# Patient Record
Sex: MALE | Race: WHITE | NOT HISPANIC OR LATINO | Employment: UNEMPLOYED | URBAN - NONMETROPOLITAN AREA
[De-identification: names, ages, dates, MRNs, and addresses within clinical notes are randomized per-mention and may not be internally consistent; named-entity substitution may affect disease eponyms.]

---

## 2019-05-26 ENCOUNTER — APPOINTMENT (OUTPATIENT)
Dept: GENERAL RADIOLOGY | Facility: HOSPITAL | Age: 44
End: 2019-05-26

## 2019-05-26 ENCOUNTER — HOSPITAL ENCOUNTER (EMERGENCY)
Facility: HOSPITAL | Age: 44
Discharge: HOME | End: 2019-05-26
Attending: EMERGENCY MEDICINE | Admitting: EMERGENCY MEDICINE

## 2019-05-26 VITALS
OXYGEN SATURATION: 94 % | BODY MASS INDEX: 27.19 KG/M2 | HEIGHT: 68 IN | TEMPERATURE: 98.2 F | SYSTOLIC BLOOD PRESSURE: 121 MMHG | DIASTOLIC BLOOD PRESSURE: 63 MMHG | RESPIRATION RATE: 16 BRPM | HEART RATE: 84 BPM | WEIGHT: 179.4 LBS

## 2019-05-26 DIAGNOSIS — N30.00 ACUTE CYSTITIS WITHOUT HEMATURIA: ICD-10-CM

## 2019-05-26 DIAGNOSIS — J40 BRONCHITIS: Primary | ICD-10-CM

## 2019-05-26 LAB
BACTERIA UR QL AUTO: ABNORMAL /HPF
BILIRUB UR QL STRIP: ABNORMAL
CLARITY UR: CLEAR
COLOR UR: ABNORMAL
GLUCOSE UR STRIP-MCNC: NEGATIVE MG/DL
GRAN CASTS URNS QL MICRO: ABNORMAL /LPF
HGB UR QL STRIP.AUTO: NEGATIVE
HYALINE CASTS UR QL AUTO: ABNORMAL /LPF
KETONES UR QL STRIP: ABNORMAL
LEUKOCYTE ESTERASE UR QL STRIP.AUTO: ABNORMAL
MUCOUS THREADS URNS QL MICRO: ABNORMAL /HPF
NITRITE UR QL STRIP: NEGATIVE
PH UR STRIP.AUTO: 5.5 [PH] (ref 5–8)
PROT UR QL STRIP: NEGATIVE
RBC # UR: ABNORMAL /HPF
REF LAB TEST METHOD: ABNORMAL
SP GR UR STRIP: >=1.03 (ref 1–1.03)
SQUAMOUS #/AREA URNS HPF: ABNORMAL /HPF
UROBILINOGEN UR QL STRIP: ABNORMAL
WBC UR QL AUTO: ABNORMAL /HPF

## 2019-05-26 PROCEDURE — 94640 AIRWAY INHALATION TREATMENT: CPT

## 2019-05-26 PROCEDURE — 71046 X-RAY EXAM CHEST 2 VIEWS: CPT

## 2019-05-26 PROCEDURE — 81001 URINALYSIS AUTO W/SCOPE: CPT | Performed by: NURSE PRACTITIONER

## 2019-05-26 PROCEDURE — 99283 EMERGENCY DEPT VISIT LOW MDM: CPT

## 2019-05-26 PROCEDURE — 87086 URINE CULTURE/COLONY COUNT: CPT | Performed by: NURSE PRACTITIONER

## 2019-05-26 PROCEDURE — 94799 UNLISTED PULMONARY SVC/PX: CPT

## 2019-05-26 RX ORDER — ALBUTEROL SULFATE 90 UG/1
2 AEROSOL, METERED RESPIRATORY (INHALATION) EVERY 6 HOURS PRN
Qty: 1 INHALER | Refills: 0 | Status: SHIPPED | OUTPATIENT
Start: 2019-05-26

## 2019-05-26 RX ORDER — METHYLPREDNISOLONE 4 MG/1
TABLET ORAL
Qty: 1 EACH | Refills: 0 | Status: ON HOLD | OUTPATIENT
Start: 2019-05-26 | End: 2020-11-30

## 2019-05-26 RX ORDER — CEPHALEXIN 500 MG/1
500 CAPSULE ORAL 3 TIMES DAILY
Qty: 30 CAPSULE | Refills: 0 | Status: ON HOLD | OUTPATIENT
Start: 2019-05-26 | End: 2020-11-30

## 2019-05-26 RX ORDER — IPRATROPIUM BROMIDE AND ALBUTEROL SULFATE 2.5; .5 MG/3ML; MG/3ML
3 SOLUTION RESPIRATORY (INHALATION) ONCE
Status: COMPLETED | OUTPATIENT
Start: 2019-05-26 | End: 2019-05-26

## 2019-05-26 RX ADMIN — IPRATROPIUM BROMIDE AND ALBUTEROL SULFATE 3 ML: .5; 3 SOLUTION RESPIRATORY (INHALATION) at 10:37

## 2019-05-28 LAB — BACTERIA SPEC AEROBE CULT: NO GROWTH

## 2019-07-23 ENCOUNTER — HOSPITAL ENCOUNTER (EMERGENCY)
Facility: HOSPITAL | Age: 44
Discharge: HOME | End: 2019-07-23
Attending: EMERGENCY MEDICINE | Admitting: EMERGENCY MEDICINE

## 2019-07-23 VITALS
OXYGEN SATURATION: 98 % | HEART RATE: 77 BPM | BODY MASS INDEX: 25.11 KG/M2 | DIASTOLIC BLOOD PRESSURE: 79 MMHG | TEMPERATURE: 97.4 F | SYSTOLIC BLOOD PRESSURE: 132 MMHG | RESPIRATION RATE: 18 BRPM | WEIGHT: 160 LBS | HEIGHT: 67 IN

## 2019-07-23 DIAGNOSIS — B37.0 THRUSH: ICD-10-CM

## 2019-07-23 DIAGNOSIS — B35.6 TINEA CRURIS: Primary | ICD-10-CM

## 2019-07-23 DIAGNOSIS — R44.3 HALLUCINATIONS: ICD-10-CM

## 2019-07-23 DIAGNOSIS — F11.21 OPIOID DEPENDENCE IN REMISSION (HCC): ICD-10-CM

## 2019-07-23 LAB
ALBUMIN SERPL-MCNC: 4.1 G/DL (ref 3.5–5)
ALBUMIN/GLOB SERPL: 1.2 G/DL (ref 1–2)
ALP SERPL-CCNC: 92 U/L (ref 38–126)
ALT SERPL W P-5'-P-CCNC: 41 U/L (ref 13–69)
AMPHET+METHAMPHET UR QL: NEGATIVE
AMPHETAMINES UR QL: NEGATIVE
ANION GAP SERPL CALCULATED.3IONS-SCNC: 11.6 MMOL/L (ref 10–20)
APAP SERPL-MCNC: <10 MCG/ML (ref 10–30)
AST SERPL-CCNC: 72 U/L (ref 15–46)
BACTERIA UR QL AUTO: ABNORMAL /HPF
BARBITURATES UR QL SCN: NEGATIVE
BASOPHILS # BLD AUTO: 0.02 10*3/MM3 (ref 0–0.2)
BASOPHILS NFR BLD AUTO: 0.3 % (ref 0–1.5)
BENZODIAZ UR QL SCN: NEGATIVE
BILIRUB SERPL-MCNC: 1 MG/DL (ref 0.2–1.3)
BILIRUB UR QL STRIP: ABNORMAL
BUN BLD-MCNC: 17 MG/DL (ref 7–20)
BUN/CREAT SERPL: 18.9 (ref 6.3–21.9)
BUPRENORPHINE SERPL-MCNC: POSITIVE NG/ML
CALCIUM SPEC-SCNC: 9.2 MG/DL (ref 8.4–10.2)
CANNABINOIDS SERPL QL: NEGATIVE
CHLORIDE SERPL-SCNC: 100 MMOL/L (ref 98–107)
CLARITY UR: CLEAR
CO2 SERPL-SCNC: 29 MMOL/L (ref 26–30)
COCAINE UR QL: NEGATIVE
COLOR UR: ABNORMAL
CREAT BLD-MCNC: 0.9 MG/DL (ref 0.6–1.3)
DEPRECATED RDW RBC AUTO: 47.6 FL (ref 37–54)
EOSINOPHIL # BLD AUTO: 0.11 10*3/MM3 (ref 0–0.4)
EOSINOPHIL NFR BLD AUTO: 1.6 % (ref 0.3–6.2)
ERYTHROCYTE [DISTWIDTH] IN BLOOD BY AUTOMATED COUNT: 14.1 % (ref 12.3–15.4)
ETHANOL BLD-MCNC: <10 MG/DL
ETHANOL UR QL: <0.01 %
GFR SERPL CREATININE-BSD FRML MDRD: 93 ML/MIN/1.73
GLOBULIN UR ELPH-MCNC: 3.5 GM/DL
GLUCOSE BLD-MCNC: 101 MG/DL (ref 74–98)
GLUCOSE UR STRIP-MCNC: NEGATIVE MG/DL
HCT VFR BLD AUTO: 43.9 % (ref 37.5–51)
HGB BLD-MCNC: 14.6 G/DL (ref 13–17.7)
HGB UR QL STRIP.AUTO: ABNORMAL
HYALINE CASTS UR QL AUTO: ABNORMAL /LPF
IMM GRANULOCYTES # BLD AUTO: 0.02 10*3/MM3 (ref 0–0.05)
IMM GRANULOCYTES NFR BLD AUTO: 0.3 % (ref 0–0.5)
KETONES UR QL STRIP: ABNORMAL
LEUKOCYTE ESTERASE UR QL STRIP.AUTO: ABNORMAL
LYMPHOCYTES # BLD AUTO: 1.64 10*3/MM3 (ref 0.7–3.1)
LYMPHOCYTES NFR BLD AUTO: 23.2 % (ref 19.6–45.3)
MAGNESIUM SERPL-MCNC: 2 MG/DL (ref 1.6–2.3)
MCH RBC QN AUTO: 30.4 PG (ref 26.6–33)
MCHC RBC AUTO-ENTMCNC: 33.3 G/DL (ref 31.5–35.7)
MCV RBC AUTO: 91.3 FL (ref 79–97)
METHADONE UR QL SCN: NEGATIVE
MONOCYTES # BLD AUTO: 0.65 10*3/MM3 (ref 0.1–0.9)
MONOCYTES NFR BLD AUTO: 9.2 % (ref 5–12)
MUCOUS THREADS URNS QL MICRO: ABNORMAL /HPF
NEUTROPHILS # BLD AUTO: 4.64 10*3/MM3 (ref 1.7–7)
NEUTROPHILS NFR BLD AUTO: 65.4 % (ref 42.7–76)
NITRITE UR QL STRIP: NEGATIVE
NRBC BLD AUTO-RTO: 0 /100 WBC (ref 0–0.2)
OPIATES UR QL: NEGATIVE
OXYCODONE UR QL SCN: NEGATIVE
PCP UR QL SCN: NEGATIVE
PH UR STRIP.AUTO: 6 [PH] (ref 5–8)
PLATELET # BLD AUTO: 84 10*3/MM3 (ref 140–450)
PMV BLD AUTO: 12 FL (ref 6–12)
POTASSIUM BLD-SCNC: 3.6 MMOL/L (ref 3.5–5.1)
PROPOXYPH UR QL: NEGATIVE
PROT SERPL-MCNC: 7.6 G/DL (ref 6.3–8.2)
PROT UR QL STRIP: ABNORMAL
RBC # BLD AUTO: 4.81 10*6/MM3 (ref 4.14–5.8)
RBC # UR: ABNORMAL /HPF
REF LAB TEST METHOD: ABNORMAL
SALICYLATES SERPL-MCNC: <1 MG/DL (ref 2.8–20)
SODIUM BLD-SCNC: 137 MMOL/L (ref 137–145)
SP GR UR STRIP: >=1.03 (ref 1–1.03)
SQUAMOUS #/AREA URNS HPF: ABNORMAL /HPF
TRICYCLICS UR QL SCN: NEGATIVE
UROBILINOGEN UR QL STRIP: ABNORMAL
WBC NRBC COR # BLD: 7.08 10*3/MM3 (ref 3.4–10.8)
WBC UR QL AUTO: ABNORMAL /HPF

## 2019-07-23 PROCEDURE — 87086 URINE CULTURE/COLONY COUNT: CPT | Performed by: EMERGENCY MEDICINE

## 2019-07-23 PROCEDURE — 80307 DRUG TEST PRSMV CHEM ANLYZR: CPT | Performed by: EMERGENCY MEDICINE

## 2019-07-23 PROCEDURE — 80306 DRUG TEST PRSMV INSTRMNT: CPT | Performed by: EMERGENCY MEDICINE

## 2019-07-23 PROCEDURE — 83735 ASSAY OF MAGNESIUM: CPT | Performed by: EMERGENCY MEDICINE

## 2019-07-23 PROCEDURE — 80053 COMPREHEN METABOLIC PANEL: CPT | Performed by: EMERGENCY MEDICINE

## 2019-07-23 PROCEDURE — 81001 URINALYSIS AUTO W/SCOPE: CPT | Performed by: EMERGENCY MEDICINE

## 2019-07-23 PROCEDURE — 85025 COMPLETE CBC W/AUTO DIFF WBC: CPT | Performed by: EMERGENCY MEDICINE

## 2019-07-23 PROCEDURE — 99285 EMERGENCY DEPT VISIT HI MDM: CPT

## 2019-07-23 PROCEDURE — 96360 HYDRATION IV INFUSION INIT: CPT

## 2019-07-23 RX ORDER — SODIUM CHLORIDE 0.9 % (FLUSH) 0.9 %
10 SYRINGE (ML) INJECTION AS NEEDED
Status: DISCONTINUED | OUTPATIENT
Start: 2019-07-23 | End: 2019-07-23 | Stop reason: HOSPADM

## 2019-07-23 RX ORDER — BUPRENORPHINE HYDROCHLORIDE AND NALOXONE HYDROCHLORIDE DIHYDRATE 8; 2 MG/1; MG/1
1.5 TABLET SUBLINGUAL DAILY
COMMUNITY
End: 2020-12-02 | Stop reason: HOSPADM

## 2019-07-23 RX ORDER — FLUCONAZOLE 100 MG/1
100 TABLET ORAL DAILY
Qty: 7 TABLET | Refills: 0 | Status: SHIPPED | OUTPATIENT
Start: 2019-07-23 | End: 2020-12-02 | Stop reason: HOSPADM

## 2019-07-23 RX ADMIN — SODIUM CHLORIDE 1000 ML: 9 INJECTION, SOLUTION INTRAVENOUS at 02:35

## 2019-07-24 LAB — BACTERIA SPEC AEROBE CULT: NO GROWTH

## 2019-08-12 ENCOUNTER — HOSPITAL ENCOUNTER (EMERGENCY)
Facility: HOSPITAL | Age: 44
Discharge: HOME | End: 2019-08-12
Attending: EMERGENCY MEDICINE | Admitting: EMERGENCY MEDICINE

## 2019-08-12 ENCOUNTER — APPOINTMENT (OUTPATIENT)
Dept: GENERAL RADIOLOGY | Facility: HOSPITAL | Age: 44
End: 2019-08-12

## 2019-08-12 VITALS
HEIGHT: 67 IN | WEIGHT: 146 LBS | TEMPERATURE: 98.1 F | BODY MASS INDEX: 22.91 KG/M2 | RESPIRATION RATE: 16 BRPM | DIASTOLIC BLOOD PRESSURE: 80 MMHG | OXYGEN SATURATION: 96 % | SYSTOLIC BLOOD PRESSURE: 142 MMHG | HEART RATE: 93 BPM

## 2019-08-12 DIAGNOSIS — S60.10XA SUBUNGUAL HEMATOMA OF DIGIT OF HAND, INITIAL ENCOUNTER: Primary | ICD-10-CM

## 2019-08-12 DIAGNOSIS — S60.112A CONTUSION OF LEFT THUMB WITH DAMAGE TO NAIL, INITIAL ENCOUNTER: ICD-10-CM

## 2019-08-12 PROCEDURE — 99283 EMERGENCY DEPT VISIT LOW MDM: CPT

## 2019-08-12 PROCEDURE — 73130 X-RAY EXAM OF HAND: CPT

## 2019-08-12 RX ORDER — IBUPROFEN 600 MG/1
600 TABLET ORAL EVERY 8 HOURS PRN
Qty: 12 TABLET | Refills: 0 | Status: SHIPPED | OUTPATIENT
Start: 2019-08-12 | End: 2020-12-02 | Stop reason: HOSPADM

## 2019-08-12 RX ORDER — TRAMADOL HYDROCHLORIDE 50 MG/1
50 TABLET ORAL ONCE
Status: DISCONTINUED | OUTPATIENT
Start: 2019-08-12 | End: 2019-08-12 | Stop reason: HOSPADM

## 2019-08-12 RX ORDER — IBUPROFEN 800 MG/1
800 TABLET ORAL ONCE
Status: COMPLETED | OUTPATIENT
Start: 2019-08-12 | End: 2019-08-12

## 2019-08-12 RX ORDER — SENNOSIDES 8.6 MG
650 CAPSULE ORAL EVERY 8 HOURS PRN
Qty: 12 TABLET | Refills: 0 | Status: SHIPPED | OUTPATIENT
Start: 2019-08-12

## 2019-08-12 RX ADMIN — IBUPROFEN 800 MG: 800 TABLET ORAL at 08:40

## 2019-08-12 NOTE — ED PROVIDER NOTES
Subjective   Patient is here with localized injury left thumb got caught in a car door early this morning presents here for further evaluation        History provided by:  Patient      Review of Systems   Constitutional: Negative.    HENT: Negative.    Respiratory: Negative.    Cardiovascular: Negative.    Musculoskeletal: Positive for arthralgias.   Skin: Negative.         Injury nail bed   Neurological: Negative.    Psychiatric/Behavioral: The patient is nervous/anxious.    All other systems reviewed and are negative.      Past Medical History:   Diagnosis Date   • Cancer (CMS/HCC)     skin       No Known Allergies    History reviewed. No pertinent surgical history.    History reviewed. No pertinent family history.    Social History     Socioeconomic History   • Marital status: Single     Spouse name: Not on file   • Number of children: Not on file   • Years of education: Not on file   • Highest education level: Not on file   Tobacco Use   • Smoking status: Current Every Day Smoker     Packs/day: 1.00     Types: Cigarettes   Substance and Sexual Activity   • Alcohol use: No     Frequency: Never   • Drug use: Yes     Comment: suboxone for opiods           Objective   Physical Exam   Constitutional: He is oriented to person, place, and time. He appears well-developed and well-nourished.   Afebrile nontoxic no acute distress   HENT:   Head: Normocephalic and atraumatic.   Eyes: Conjunctivae and EOM are normal. Pupils are equal, round, and reactive to light.   Neck: Normal range of motion.   Cardiovascular: Normal rate, regular rhythm and intact distal pulses.   Pulmonary/Chest: Effort normal.   Musculoskeletal: Normal range of motion. He exhibits tenderness. He exhibits no edema or deformity.   TTP left thumb at the base and also some tenderness distally... Neuro vascular intact full range of motion   Neurological: He is alert and oriented to person, place, and time. No cranial nerve deficit or sensory deficit. He  exhibits normal muscle tone. Coordination normal.   Skin: Skin is warm and dry. Capillary refill takes less than 2 seconds.   Noted small less than approximately 30% subungual hematoma left nail plate thumb   Psychiatric: He has a normal mood and affect. His behavior is normal. Judgment and thought content normal.   Nursing note and vitals reviewed.      Incision & Drainage  Date/Time: 8/12/2019 9:08 AM  Performed by: Kris Mane PA-C  Authorized by: Hortencia Sanabria MD     Location:     Type:  Subungual hematoma    Size:  30%    Location:  Upper extremity    Upper extremity location:  Finger    Finger location:  L thumb  Anesthesia (see MAR for exact dosages):     Anesthesia method:  None  Procedure type:     Complexity:  Simple  Procedure details:     Needle aspiration: no      Incision type: Cautery.    Drainage:  Bloody  Post-procedure details:     Patient tolerance of procedure:  Tolerated well, no immediate complications  Comments:      Use of cautery puncture to nail plate with evacuation of blood                 ED Course  ED Course as of Aug 12 0911   Mon Aug 12, 2019   0848 Pt  resting pending  report  [SC]      ED Course User Index  [SC] Kris Mane PA-C                  MDM  Number of Diagnoses or Management Options     Amount and/or Complexity of Data Reviewed  Tests in the radiology section of CPT®: reviewed  Review and summarize past medical records: yes  Discuss the patient with other providers: yes    Risk of Complications, Morbidity, and/or Mortality  Presenting problems: low  Diagnostic procedures: low  Management options: low          Final diagnoses:   Subungual hematoma of digit of hand, initial encounter   Contusion of left thumb with damage to nail, initial encounter            Kris Mane PA-C  08/12/19 0911

## 2020-09-10 ENCOUNTER — HOSPITAL ENCOUNTER (EMERGENCY)
Facility: HOSPITAL | Age: 45
Discharge: HOME | End: 2020-09-10
Attending: EMERGENCY MEDICINE | Admitting: EMERGENCY MEDICINE

## 2020-09-10 VITALS
TEMPERATURE: 97.9 F | SYSTOLIC BLOOD PRESSURE: 122 MMHG | WEIGHT: 140 LBS | HEART RATE: 85 BPM | OXYGEN SATURATION: 98 % | RESPIRATION RATE: 19 BRPM | HEIGHT: 67 IN | DIASTOLIC BLOOD PRESSURE: 74 MMHG | BODY MASS INDEX: 21.97 KG/M2

## 2020-09-10 DIAGNOSIS — T14.8XXA INFECTED WOUND: Primary | ICD-10-CM

## 2020-09-10 DIAGNOSIS — R20.2 FORMICATION: ICD-10-CM

## 2020-09-10 DIAGNOSIS — F15.10 METHAMPHETAMINE ABUSE (HCC): ICD-10-CM

## 2020-09-10 DIAGNOSIS — L08.9 INFECTED WOUND: Primary | ICD-10-CM

## 2020-09-10 PROCEDURE — 99282 EMERGENCY DEPT VISIT SF MDM: CPT

## 2020-09-10 NOTE — ED PROVIDER NOTES
"Subjective   45-year-old male presenting with concern about parasites.  He states that for the last couple months he has had bugs crawling out of his skin, out of his nose out of his mouth.  Primary doctor \"thought I was crazy\".  They prescribed some sort of antibiotic.  This did not help with the symptoms.  He states that his father told him it was \"all in my head\".  His friends state that they see the bugs have been picking them out of him with tweezers.  They also wrapped his head in a coconut oil soaked towel tonight.  This also did not help.  He does admit to methamphetamine abuse.  He denies any other complaints or concerns.          Review of Systems   Constitutional: Negative.    HENT: Negative.    Eyes: Negative.    Respiratory: Negative.    Cardiovascular: Negative.    Gastrointestinal: Negative.    Genitourinary: Negative.    Musculoskeletal: Negative.    Skin: Positive for wound.        Formication   Neurological: Negative.    Psychiatric/Behavioral: Negative.        Past Medical History:   Diagnosis Date   • Cancer (CMS/[facility])     skin       No Known Allergies    History reviewed. No pertinent surgical history.    History reviewed. No pertinent family history.    Social History     Socioeconomic History   • Marital status: Single     Spouse name: Not on file   • Number of children: Not on file   • Years of education: Not on file   • Highest education level: Not on file   Tobacco Use   • Smoking status: Current Every Day Smoker     Packs/day: 1.00     Types: Cigarettes   Substance and Sexual Activity   • Alcohol use: No     Frequency: Never   • Drug use: Yes     Comment: suboxone for opiods           Objective   Physical Exam   Constitutional: He is oriented to person, place, and time. He appears well-developed and well-nourished. No distress.   HENT:   Head: Normocephalic and atraumatic.   Right Ear: External ear normal.   Left Ear: External ear normal.   Nose: Nose normal.   Mouth/Throat: Oropharynx is " clear and moist.   Eyes: Pupils are equal, round, and reactive to light. Conjunctivae and EOM are normal.   Neck: Normal range of motion. Neck supple.   Cardiovascular: Normal rate, regular rhythm, normal heart sounds and intact distal pulses.   Pulmonary/Chest: Effort normal and breath sounds normal. No respiratory distress.   Abdominal: Soft. Bowel sounds are normal. He exhibits no distension. There is no tenderness. There is no rebound and no guarding.   Musculoskeletal: Normal range of motion. He exhibits no edema, tenderness or deformity.   Neurological: He is alert and oriented to person, place, and time.   Skin: Skin is warm and dry. No rash noted.   Few scattered ulcerated wounds of the scalp and hands, very mild surrounding erythema, no visible parasites   Psychiatric: He has a normal mood and affect. His behavior is normal.   Nursing note and vitals reviewed.      Procedures           ED Course                                           MDM  Number of Diagnoses or Management Options  Formication:   Infected wound:   Methamphetamine abuse (CMS/[facility]):   Diagnosis management comments: 45-year-old male with formication, methamphetamine abuse.  Well-developed, well-nourished man in no distress with exam as above.  I appreciate no signs of any parasites on exam.  I do believe this is all secondary to his methamphetamine abuse.  I will write him for some mupirocin cream given a few of the areas do look infected.  Otherwise supportive measures discussed.    DDX: Formication, infected wounds, methamphetamine abuse      Final diagnoses:   Infected wound   Formication   Methamphetamine abuse (CMS/HCC)            Jay Wagner MD  09/10/20 0664

## 2020-11-30 ENCOUNTER — HOSPITAL ENCOUNTER (OUTPATIENT)
Facility: HOSPITAL | Age: 45
Setting detail: OBSERVATION
Discharge: HOME | End: 2020-12-02
Attending: STUDENT IN AN ORGANIZED HEALTH CARE EDUCATION/TRAINING PROGRAM | Admitting: EMERGENCY MEDICINE

## 2020-11-30 ENCOUNTER — APPOINTMENT (OUTPATIENT)
Dept: CT IMAGING | Facility: HOSPITAL | Age: 45
End: 2020-11-30

## 2020-11-30 ENCOUNTER — APPOINTMENT (OUTPATIENT)
Dept: GENERAL RADIOLOGY | Facility: HOSPITAL | Age: 45
End: 2020-11-30

## 2020-11-30 DIAGNOSIS — R18.8 CIRRHOSIS OF LIVER WITH ASCITES, UNSPECIFIED HEPATIC CIRRHOSIS TYPE (HCC): ICD-10-CM

## 2020-11-30 DIAGNOSIS — R11.2 NON-INTRACTABLE VOMITING WITH NAUSEA, UNSPECIFIED VOMITING TYPE: ICD-10-CM

## 2020-11-30 DIAGNOSIS — K74.60 CIRRHOSIS OF LIVER WITH ASCITES, UNSPECIFIED HEPATIC CIRRHOSIS TYPE (HCC): ICD-10-CM

## 2020-11-30 DIAGNOSIS — R65.20 SEPSIS WITH ACUTE ORGAN DYSFUNCTION WITHOUT SEPTIC SHOCK, DUE TO UNSPECIFIED ORGANISM, UNSPECIFIED TYPE (HCC): Primary | ICD-10-CM

## 2020-11-30 DIAGNOSIS — R74.01 TRANSAMINITIS: ICD-10-CM

## 2020-11-30 DIAGNOSIS — A41.9 SEPSIS WITH ACUTE ORGAN DYSFUNCTION WITHOUT SEPTIC SHOCK, DUE TO UNSPECIFIED ORGANISM, UNSPECIFIED TYPE (HCC): Primary | ICD-10-CM

## 2020-11-30 DIAGNOSIS — E87.20 LACTIC ACIDOSIS: ICD-10-CM

## 2020-11-30 LAB
ALBUMIN SERPL-MCNC: 2.9 G/DL (ref 3.5–5.2)
ALBUMIN/GLOB SERPL: 0.8 G/DL
ALP SERPL-CCNC: 157 U/L (ref 39–117)
ALT SERPL W P-5'-P-CCNC: 197 U/L (ref 1–41)
AMMONIA BLD-SCNC: 37 UMOL/L (ref 16–60)
AMPHET+METHAMPHET UR QL: POSITIVE
AMPHETAMINES UR QL: POSITIVE
ANION GAP SERPL CALCULATED.3IONS-SCNC: 10.6 MMOL/L (ref 5–15)
APAP SERPL-MCNC: <5 MCG/ML (ref 0–30)
APTT PPP: 32.1 SECONDS (ref 24.5–37.2)
AST SERPL-CCNC: 184 U/L (ref 1–40)
BACTERIA UR QL AUTO: ABNORMAL /HPF
BARBITURATES UR QL SCN: NEGATIVE
BASOPHILS # BLD AUTO: 0.01 10*3/MM3 (ref 0–0.2)
BASOPHILS NFR BLD AUTO: 0.3 % (ref 0–1.5)
BENZODIAZ UR QL SCN: NEGATIVE
BILIRUB SERPL-MCNC: 1.4 MG/DL (ref 0–1.2)
BILIRUB UR QL STRIP: NEGATIVE
BUN SERPL-MCNC: 17 MG/DL (ref 6–20)
BUN/CREAT SERPL: 21.8 (ref 7–25)
BUPRENORPHINE SERPL-MCNC: NEGATIVE NG/ML
CALCIUM SPEC-SCNC: 8.8 MG/DL (ref 8.6–10.5)
CANNABINOIDS SERPL QL: NEGATIVE
CHLORIDE SERPL-SCNC: 107 MMOL/L (ref 98–107)
CLARITY UR: CLEAR
CO2 SERPL-SCNC: 22.4 MMOL/L (ref 22–29)
COCAINE UR QL: NEGATIVE
COLOR UR: YELLOW
CREAT SERPL-MCNC: 0.78 MG/DL (ref 0.76–1.27)
CRP SERPL-MCNC: 0.91 MG/DL (ref 0–0.5)
D-LACTATE SERPL-SCNC: 2.1 MMOL/L (ref 0.5–2)
D-LACTATE SERPL-SCNC: 3.4 MMOL/L (ref 0.5–2)
DEPRECATED RDW RBC AUTO: 46.3 FL (ref 37–54)
EOSINOPHIL # BLD AUTO: 0 10*3/MM3 (ref 0–0.4)
EOSINOPHIL NFR BLD AUTO: 0 % (ref 0.3–6.2)
ERYTHROCYTE [DISTWIDTH] IN BLOOD BY AUTOMATED COUNT: 14.3 % (ref 12.3–15.4)
ETHANOL BLD-MCNC: <10 MG/DL (ref 0–10)
ETHANOL UR QL: <0.01 %
FLUAV AG NPH QL: NEGATIVE
FLUBV AG NPH QL IA: NEGATIVE
GFR SERPL CREATININE-BSD FRML MDRD: 108 ML/MIN/1.73
GLOBULIN UR ELPH-MCNC: 3.5 GM/DL
GLUCOSE SERPL-MCNC: 120 MG/DL (ref 65–99)
GLUCOSE UR STRIP-MCNC: NEGATIVE MG/DL
HCT VFR BLD AUTO: 40 % (ref 37.5–51)
HGB BLD-MCNC: 13.3 G/DL (ref 13–17.7)
HGB UR QL STRIP.AUTO: ABNORMAL
HIV1 P24 AG SER QL: NORMAL
HIV1+2 AB SER QL: NORMAL
HYALINE CASTS UR QL AUTO: ABNORMAL /LPF
IMM GRANULOCYTES # BLD AUTO: 0.06 10*3/MM3 (ref 0–0.05)
IMM GRANULOCYTES NFR BLD AUTO: 2 % (ref 0–0.5)
INR PPP: 1.26 (ref 0.9–1.1)
KETONES UR QL STRIP: NEGATIVE
LACTATE HOLD SPECIMEN: NORMAL
LEUKOCYTE ESTERASE UR QL STRIP.AUTO: NEGATIVE
LIPASE SERPL-CCNC: 21 U/L (ref 13–60)
LYMPHOCYTES # BLD AUTO: 0.14 10*3/MM3 (ref 0.7–3.1)
LYMPHOCYTES NFR BLD AUTO: 4.6 % (ref 19.6–45.3)
MCH RBC QN AUTO: 29.8 PG (ref 26.6–33)
MCHC RBC AUTO-ENTMCNC: 33.3 G/DL (ref 31.5–35.7)
MCV RBC AUTO: 89.5 FL (ref 79–97)
METHADONE UR QL SCN: NEGATIVE
MONOCYTES # BLD AUTO: 0.01 10*3/MM3 (ref 0.1–0.9)
MONOCYTES NFR BLD AUTO: 0.3 % (ref 5–12)
NEUTROPHILS NFR BLD AUTO: 2.84 10*3/MM3 (ref 1.7–7)
NEUTROPHILS NFR BLD AUTO: 92.8 % (ref 42.7–76)
NITRITE UR QL STRIP: NEGATIVE
NRBC BLD AUTO-RTO: 0 /100 WBC (ref 0–0.2)
NT-PROBNP SERPL-MCNC: 104.1 PG/ML (ref 0–450)
OPIATES UR QL: POSITIVE
OXYCODONE UR QL SCN: NEGATIVE
PCP UR QL SCN: NEGATIVE
PH UR STRIP.AUTO: 5.5 [PH] (ref 5–8)
PLATELET # BLD AUTO: 96 10*3/MM3 (ref 140–450)
PMV BLD AUTO: 9.9 FL (ref 6–12)
POTASSIUM SERPL-SCNC: 4.3 MMOL/L (ref 3.5–5.2)
PROCALCITONIN SERPL-MCNC: 8.84 NG/ML (ref 0–0.25)
PROPOXYPH UR QL: NEGATIVE
PROT SERPL-MCNC: 6.4 G/DL (ref 6–8.5)
PROT UR QL STRIP: NEGATIVE
PROTHROMBIN TIME: 16.5 SECONDS (ref 12–15.1)
RBC # BLD AUTO: 4.47 10*6/MM3 (ref 4.14–5.8)
RBC # UR: ABNORMAL /HPF
REF LAB TEST METHOD: ABNORMAL
SALICYLATES SERPL-MCNC: <0.3 MG/DL
SARS-COV-2 RNA PNL SPEC NAA+PROBE: NOT DETECTED
SODIUM SERPL-SCNC: 140 MMOL/L (ref 136–145)
SP GR UR STRIP: 1.02 (ref 1–1.03)
SQUAMOUS #/AREA URNS HPF: ABNORMAL /HPF
TRICYCLICS UR QL SCN: NEGATIVE
TROPONIN T SERPL-MCNC: <0.01 NG/ML (ref 0–0.03)
UROBILINOGEN UR QL STRIP: ABNORMAL
WBC # BLD AUTO: 3.06 10*3/MM3 (ref 3.4–10.8)
WBC UR QL AUTO: ABNORMAL /HPF

## 2020-11-30 PROCEDURE — 93005 ELECTROCARDIOGRAM TRACING: CPT | Performed by: PHYSICIAN ASSISTANT

## 2020-11-30 PROCEDURE — 87040 BLOOD CULTURE FOR BACTERIA: CPT | Performed by: PHYSICIAN ASSISTANT

## 2020-11-30 PROCEDURE — 82140 ASSAY OF AMMONIA: CPT | Performed by: PHYSICIAN ASSISTANT

## 2020-11-30 PROCEDURE — 83605 ASSAY OF LACTIC ACID: CPT | Performed by: PHYSICIAN ASSISTANT

## 2020-11-30 PROCEDURE — 25010000002 IOPAMIDOL 61 % SOLUTION: Performed by: EMERGENCY MEDICINE

## 2020-11-30 PROCEDURE — 85730 THROMBOPLASTIN TIME PARTIAL: CPT | Performed by: PHYSICIAN ASSISTANT

## 2020-11-30 PROCEDURE — G0378 HOSPITAL OBSERVATION PER HR: HCPCS

## 2020-11-30 PROCEDURE — 99220 PR INITIAL OBSERVATION CARE/DAY 70 MINUTES: CPT | Performed by: EMERGENCY MEDICINE

## 2020-11-30 PROCEDURE — 83690 ASSAY OF LIPASE: CPT | Performed by: PHYSICIAN ASSISTANT

## 2020-11-30 PROCEDURE — 74177 CT ABD & PELVIS W/CONTRAST: CPT

## 2020-11-30 PROCEDURE — 83880 ASSAY OF NATRIURETIC PEPTIDE: CPT | Performed by: PHYSICIAN ASSISTANT

## 2020-11-30 PROCEDURE — 80053 COMPREHEN METABOLIC PANEL: CPT | Performed by: PHYSICIAN ASSISTANT

## 2020-11-30 PROCEDURE — 84145 PROCALCITONIN (PCT): CPT | Performed by: PHYSICIAN ASSISTANT

## 2020-11-30 PROCEDURE — 80074 ACUTE HEPATITIS PANEL: CPT | Performed by: PHYSICIAN ASSISTANT

## 2020-11-30 PROCEDURE — 99285 EMERGENCY DEPT VISIT HI MDM: CPT

## 2020-11-30 PROCEDURE — 80307 DRUG TEST PRSMV CHEM ANLYZR: CPT | Performed by: EMERGENCY MEDICINE

## 2020-11-30 PROCEDURE — 80307 DRUG TEST PRSMV CHEM ANLYZR: CPT | Performed by: PHYSICIAN ASSISTANT

## 2020-11-30 PROCEDURE — 25010000002 NALOXONE PER 1 MG: Performed by: PHYSICIAN ASSISTANT

## 2020-11-30 PROCEDURE — P9612 CATHETERIZE FOR URINE SPEC: HCPCS

## 2020-11-30 PROCEDURE — 87635 SARS-COV-2 COVID-19 AMP PRB: CPT | Performed by: PHYSICIAN ASSISTANT

## 2020-11-30 PROCEDURE — 25010000002 CEFTRIAXONE SODIUM-DEXTROSE 1-3.74 GM-%(50ML) RECONSTITUTED SOLUTION: Performed by: PHYSICIAN ASSISTANT

## 2020-11-30 PROCEDURE — 96375 TX/PRO/DX INJ NEW DRUG ADDON: CPT

## 2020-11-30 PROCEDURE — G0432 EIA HIV-1/HIV-2 SCREEN: HCPCS | Performed by: EMERGENCY MEDICINE

## 2020-11-30 PROCEDURE — 71045 X-RAY EXAM CHEST 1 VIEW: CPT

## 2020-11-30 PROCEDURE — 87899 AGENT NOS ASSAY W/OPTIC: CPT | Performed by: EMERGENCY MEDICINE

## 2020-11-30 PROCEDURE — 87150 DNA/RNA AMPLIFIED PROBE: CPT | Performed by: PHYSICIAN ASSISTANT

## 2020-11-30 PROCEDURE — 87186 SC STD MICRODIL/AGAR DIL: CPT

## 2020-11-30 PROCEDURE — 81001 URINALYSIS AUTO W/SCOPE: CPT | Performed by: PHYSICIAN ASSISTANT

## 2020-11-30 PROCEDURE — 87804 INFLUENZA ASSAY W/OPTIC: CPT | Performed by: PHYSICIAN ASSISTANT

## 2020-11-30 PROCEDURE — 87186 SC STD MICRODIL/AGAR DIL: CPT | Performed by: PHYSICIAN ASSISTANT

## 2020-11-30 PROCEDURE — 85610 PROTHROMBIN TIME: CPT | Performed by: PHYSICIAN ASSISTANT

## 2020-11-30 PROCEDURE — 25010000002 ONDANSETRON PER 1 MG: Performed by: PHYSICIAN ASSISTANT

## 2020-11-30 PROCEDURE — 85025 COMPLETE CBC W/AUTO DIFF WBC: CPT | Performed by: PHYSICIAN ASSISTANT

## 2020-11-30 PROCEDURE — 86140 C-REACTIVE PROTEIN: CPT | Performed by: EMERGENCY MEDICINE

## 2020-11-30 PROCEDURE — 96365 THER/PROPH/DIAG IV INF INIT: CPT

## 2020-11-30 PROCEDURE — C9803 HOPD COVID-19 SPEC COLLECT: HCPCS

## 2020-11-30 PROCEDURE — 71275 CT ANGIOGRAPHY CHEST: CPT

## 2020-11-30 PROCEDURE — 84484 ASSAY OF TROPONIN QUANT: CPT | Performed by: PHYSICIAN ASSISTANT

## 2020-11-30 PROCEDURE — 87158 CULTURE TYPING ADDED METHOD: CPT

## 2020-11-30 RX ORDER — CEFTRIAXONE 1 G/50ML
1 INJECTION, SOLUTION INTRAVENOUS ONCE
Status: COMPLETED | OUTPATIENT
Start: 2020-11-30 | End: 2020-11-30

## 2020-11-30 RX ORDER — SODIUM CHLORIDE 0.9 % (FLUSH) 0.9 %
10 SYRINGE (ML) INJECTION AS NEEDED
Status: DISCONTINUED | OUTPATIENT
Start: 2020-11-30 | End: 2020-12-02 | Stop reason: HOSPADM

## 2020-11-30 RX ORDER — SODIUM CHLORIDE 0.9 % (FLUSH) 0.9 %
10 SYRINGE (ML) INJECTION EVERY 12 HOURS SCHEDULED
Status: DISCONTINUED | OUTPATIENT
Start: 2020-11-30 | End: 2020-12-02 | Stop reason: HOSPADM

## 2020-11-30 RX ORDER — IBUPROFEN 800 MG/1
800 TABLET ORAL ONCE
Status: COMPLETED | OUTPATIENT
Start: 2020-11-30 | End: 2020-11-30

## 2020-11-30 RX ORDER — ACETAMINOPHEN 650 MG/1
650 SUPPOSITORY RECTAL EVERY 4 HOURS PRN
Status: DISCONTINUED | OUTPATIENT
Start: 2020-11-30 | End: 2020-12-02 | Stop reason: HOSPADM

## 2020-11-30 RX ORDER — NALOXONE HCL 0.4 MG/ML
0.4 VIAL (ML) INJECTION ONCE
Status: COMPLETED | OUTPATIENT
Start: 2020-11-30 | End: 2020-11-30

## 2020-11-30 RX ORDER — ACETAMINOPHEN 325 MG/1
650 TABLET ORAL EVERY 4 HOURS PRN
Status: DISCONTINUED | OUTPATIENT
Start: 2020-11-30 | End: 2020-12-02 | Stop reason: HOSPADM

## 2020-11-30 RX ORDER — ACETAMINOPHEN 160 MG/5ML
650 SOLUTION ORAL EVERY 4 HOURS PRN
Status: DISCONTINUED | OUTPATIENT
Start: 2020-11-30 | End: 2020-12-02 | Stop reason: HOSPADM

## 2020-11-30 RX ORDER — FUROSEMIDE 20 MG/1
20 TABLET ORAL DAILY
Status: DISCONTINUED | OUTPATIENT
Start: 2020-12-01 | End: 2020-12-02 | Stop reason: HOSPADM

## 2020-11-30 RX ORDER — NADOLOL 40 MG/1
40 TABLET ORAL
Status: DISCONTINUED | OUTPATIENT
Start: 2020-12-01 | End: 2020-12-02 | Stop reason: HOSPADM

## 2020-11-30 RX ORDER — NICOTINE 21 MG/24HR
1 PATCH, TRANSDERMAL 24 HOURS TRANSDERMAL EVERY 24 HOURS
Status: DISCONTINUED | OUTPATIENT
Start: 2020-11-30 | End: 2020-12-02 | Stop reason: HOSPADM

## 2020-11-30 RX ORDER — ACETAMINOPHEN 325 MG/1
650 TABLET ORAL EVERY 6 HOURS PRN
Status: DISCONTINUED | OUTPATIENT
Start: 2020-11-30 | End: 2020-12-02 | Stop reason: HOSPADM

## 2020-11-30 RX ORDER — SPIRONOLACTONE 25 MG/1
50 TABLET ORAL DAILY
Status: DISCONTINUED | OUTPATIENT
Start: 2020-12-01 | End: 2020-12-02 | Stop reason: HOSPADM

## 2020-11-30 RX ORDER — ONDANSETRON 2 MG/ML
4 INJECTION INTRAMUSCULAR; INTRAVENOUS ONCE
Status: COMPLETED | OUTPATIENT
Start: 2020-11-30 | End: 2020-11-30

## 2020-11-30 RX ORDER — ONDANSETRON 2 MG/ML
4 INJECTION INTRAMUSCULAR; INTRAVENOUS EVERY 6 HOURS PRN
Status: DISCONTINUED | OUTPATIENT
Start: 2020-11-30 | End: 2020-12-02 | Stop reason: HOSPADM

## 2020-11-30 RX ORDER — BUPRENORPHINE AND NALOXONE 2; .5 MG/1; MG/1
1 FILM, SOLUBLE BUCCAL; SUBLINGUAL DAILY
Status: DISCONTINUED | OUTPATIENT
Start: 2020-12-01 | End: 2020-11-30

## 2020-11-30 RX ADMIN — SODIUM CHLORIDE 1000 ML: 9 INJECTION, SOLUTION INTRAVENOUS at 16:50

## 2020-11-30 RX ADMIN — ONDANSETRON 4 MG: 2 INJECTION INTRAMUSCULAR; INTRAVENOUS at 16:53

## 2020-11-30 RX ADMIN — SODIUM CHLORIDE 1000 ML: 9 INJECTION, SOLUTION INTRAVENOUS at 18:35

## 2020-11-30 RX ADMIN — NALOXONE HYDROCHLORIDE 0.4 MG: 0.4 INJECTION, SOLUTION INTRAMUSCULAR; INTRAVENOUS; SUBCUTANEOUS at 16:55

## 2020-11-30 RX ADMIN — IBUPROFEN 800 MG: 800 TABLET, FILM COATED ORAL at 18:40

## 2020-11-30 RX ADMIN — IOPAMIDOL 100 ML: 612 INJECTION, SOLUTION INTRAVENOUS at 19:12

## 2020-11-30 RX ADMIN — CEFTRIAXONE 1 G: 1 INJECTION, SOLUTION INTRAVENOUS at 17:04

## 2020-11-30 RX ADMIN — ACETAMINOPHEN 650 MG: 325 TABLET ORAL at 16:51

## 2020-12-01 LAB
ANION GAP SERPL CALCULATED.3IONS-SCNC: 6.7 MMOL/L (ref 5–15)
BUN SERPL-MCNC: 20 MG/DL (ref 6–20)
BUN/CREAT SERPL: 28.2 (ref 7–25)
CALCIUM SPEC-SCNC: 8.5 MG/DL (ref 8.6–10.5)
CHLORIDE SERPL-SCNC: 110 MMOL/L (ref 98–107)
CO2 SERPL-SCNC: 24.3 MMOL/L (ref 22–29)
CREAT SERPL-MCNC: 0.71 MG/DL (ref 0.76–1.27)
DEPRECATED RDW RBC AUTO: 48.5 FL (ref 37–54)
ERYTHROCYTE [DISTWIDTH] IN BLOOD BY AUTOMATED COUNT: 14.6 % (ref 12.3–15.4)
GFR SERPL CREATININE-BSD FRML MDRD: 121 ML/MIN/1.73
GLUCOSE SERPL-MCNC: 109 MG/DL (ref 65–99)
HAV IGM SERPL QL IA: ABNORMAL
HBV CORE IGM SERPL QL IA: ABNORMAL
HBV SURFACE AG SERPL QL IA: ABNORMAL
HCT VFR BLD AUTO: 37.1 % (ref 37.5–51)
HCV AB SER DONR QL: REACTIVE
HGB BLD-MCNC: 12.1 G/DL (ref 13–17.7)
LYMPHOCYTES # BLD MANUAL: 1.01 10*3/MM3 (ref 0.7–3.1)
LYMPHOCYTES NFR BLD MANUAL: 12 % (ref 5–12)
LYMPHOCYTES NFR BLD MANUAL: 7 % (ref 19.6–45.3)
MCH RBC QN AUTO: 29.6 PG (ref 26.6–33)
MCHC RBC AUTO-ENTMCNC: 32.6 G/DL (ref 31.5–35.7)
MCV RBC AUTO: 90.7 FL (ref 79–97)
METAMYELOCYTES NFR BLD MANUAL: 6 % (ref 0–0)
MONOCYTES # BLD AUTO: 1.73 10*3/MM3 (ref 0.1–0.9)
NEUTROPHILS # BLD AUTO: 10.83 10*3/MM3 (ref 1.7–7)
NEUTROPHILS NFR BLD MANUAL: 43 % (ref 42.7–76)
NEUTS BAND NFR BLD MANUAL: 32 % (ref 0–5)
PLATELET # BLD AUTO: 80 10*3/MM3 (ref 140–450)
PMV BLD AUTO: 11.2 FL (ref 6–12)
POTASSIUM SERPL-SCNC: 4.3 MMOL/L (ref 3.5–5.2)
RBC # BLD AUTO: 4.09 10*6/MM3 (ref 4.14–5.8)
RBC MORPH BLD: NORMAL
SCAN SLIDE: NORMAL
SMALL PLATELETS BLD QL SMEAR: ABNORMAL
SODIUM SERPL-SCNC: 141 MMOL/L (ref 136–145)
WBC # BLD AUTO: 14.44 10*3/MM3 (ref 3.4–10.8)
WBC MORPH BLD: NORMAL

## 2020-12-01 PROCEDURE — 85025 COMPLETE CBC W/AUTO DIFF WBC: CPT | Performed by: EMERGENCY MEDICINE

## 2020-12-01 PROCEDURE — 25010000002 ENOXAPARIN PER 10 MG: Performed by: EMERGENCY MEDICINE

## 2020-12-01 PROCEDURE — 63710000001 ONDANSETRON ODT 4 MG TABLET DISPERSIBLE: Performed by: INTERNAL MEDICINE

## 2020-12-01 PROCEDURE — 25010000002 MORPHINE SULFATE (PF) 2 MG/ML SOLUTION: Performed by: INTERNAL MEDICINE

## 2020-12-01 PROCEDURE — 96372 THER/PROPH/DIAG INJ SC/IM: CPT

## 2020-12-01 PROCEDURE — 25010000002 VANCOMYCIN 5 G RECONSTITUTED SOLUTION 5,000 MG VIAL: Performed by: EMERGENCY MEDICINE

## 2020-12-01 PROCEDURE — 96367 TX/PROPH/DG ADDL SEQ IV INF: CPT

## 2020-12-01 PROCEDURE — 99225 PR SBSQ OBSERVATION CARE/DAY 25 MINUTES: CPT | Performed by: EMERGENCY MEDICINE

## 2020-12-01 PROCEDURE — 80048 BASIC METABOLIC PNL TOTAL CA: CPT | Performed by: EMERGENCY MEDICINE

## 2020-12-01 PROCEDURE — 96376 TX/PRO/DX INJ SAME DRUG ADON: CPT

## 2020-12-01 PROCEDURE — 96375 TX/PRO/DX INJ NEW DRUG ADDON: CPT

## 2020-12-01 PROCEDURE — G0378 HOSPITAL OBSERVATION PER HR: HCPCS

## 2020-12-01 RX ORDER — ONDANSETRON 4 MG/1
4 TABLET, ORALLY DISINTEGRATING ORAL EVERY 6 HOURS PRN
Status: DISCONTINUED | OUTPATIENT
Start: 2020-12-01 | End: 2020-12-02 | Stop reason: HOSPADM

## 2020-12-01 RX ORDER — MORPHINE SULFATE 2 MG/ML
2 INJECTION, SOLUTION INTRAMUSCULAR; INTRAVENOUS EVERY 4 HOURS PRN
Status: DISCONTINUED | OUTPATIENT
Start: 2020-12-01 | End: 2020-12-02 | Stop reason: HOSPADM

## 2020-12-01 RX ADMIN — Medication 1 PATCH: at 20:34

## 2020-12-01 RX ADMIN — VANCOMYCIN HYDROCHLORIDE 1250 MG: 500 INJECTION, POWDER, LYOPHILIZED, FOR SOLUTION INTRAVENOUS at 11:38

## 2020-12-01 RX ADMIN — ONDANSETRON 4 MG: 4 TABLET, ORALLY DISINTEGRATING ORAL at 12:32

## 2020-12-01 RX ADMIN — ACETAMINOPHEN 650 MG: 325 TABLET, FILM COATED ORAL at 12:32

## 2020-12-01 RX ADMIN — SODIUM CHLORIDE, PRESERVATIVE FREE 10 ML: 5 INJECTION INTRAVENOUS at 00:51

## 2020-12-01 RX ADMIN — ACETAMINOPHEN 650 MG: 325 TABLET, FILM COATED ORAL at 16:27

## 2020-12-01 RX ADMIN — MORPHINE SULFATE 2 MG: 2 INJECTION, SOLUTION INTRAMUSCULAR; INTRAVENOUS at 12:32

## 2020-12-01 RX ADMIN — Medication 1 PATCH: at 12:33

## 2020-12-01 RX ADMIN — Medication 1 PATCH: at 00:41

## 2020-12-01 RX ADMIN — SODIUM CHLORIDE, PRESERVATIVE FREE 10 ML: 5 INJECTION INTRAVENOUS at 09:27

## 2020-12-01 RX ADMIN — MORPHINE SULFATE 2 MG: 2 INJECTION, SOLUTION INTRAMUSCULAR; INTRAVENOUS at 20:34

## 2020-12-01 RX ADMIN — NADOLOL 40 MG: 40 TABLET ORAL at 09:27

## 2020-12-01 RX ADMIN — ACETAMINOPHEN 650 MG: 325 TABLET ORAL at 20:34

## 2020-12-01 RX ADMIN — SPIRONOLACTONE 50 MG: 25 TABLET, FILM COATED ORAL at 09:27

## 2020-12-01 RX ADMIN — MORPHINE SULFATE 2 MG: 2 INJECTION, SOLUTION INTRAMUSCULAR; INTRAVENOUS at 16:27

## 2020-12-01 RX ADMIN — ENOXAPARIN SODIUM 40 MG: 40 INJECTION SUBCUTANEOUS at 00:41

## 2020-12-01 RX ADMIN — VANCOMYCIN HYDROCHLORIDE 1500 MG: 500 INJECTION, POWDER, LYOPHILIZED, FOR SOLUTION INTRAVENOUS at 00:41

## 2020-12-01 RX ADMIN — FUROSEMIDE 20 MG: 20 TABLET ORAL at 09:27

## 2020-12-01 RX ADMIN — SODIUM CHLORIDE, PRESERVATIVE FREE 10 ML: 5 INJECTION INTRAVENOUS at 20:40

## 2020-12-01 NOTE — PROGRESS NOTES
[facility]    Name:  Stephen Diallo   Age:  45 y.o.  Sex:  male  :  1975  MRN:  [id number]   Visit Number:  [id number]  Admission Date:  2020  Date Of Service:  20  Primary Care Physician:  Provider, No Known     LOS: 0 days :  Patient Care Team:  Provider, No Known as PCP - General  Provider, No Known as PCP - Family Medicine:    Subjective / Interval History:   Seen in follow-up for suspected bacteremia and endocarditis    Patient reports feeling much better this morning.  He is requesting a diet and is hungry.    Review of Systems:     A full 10 point review of systems was performed and all pertinent positives and negatives are noted in the HPI.    Vital Signs:    Temp:  [97.2 °F (36.2 °C)-101.5 °F (38.6 °C)] 97.2 °F (36.2 °C)  Heart Rate:  [] 83  Resp:  [16-22] 18  BP: (103-115)/(53-82) 112/55    Intake and output:    I/O last 3 completed shifts:  In: 1050 [IV Piggyback:1050]  Out: 1200 [Urine:1200]  No intake/output data recorded.    Physical Exam:  General: NAD, resting in bed asleep, arousable  HEENT: EOMI, NC/AT  Heart: regular  Lungs: nonlabored  Abdomen: Soft, nondistended, nontender  Extremities: No edema  Neurological: A&O x3, moves all extremities  Psychological: Poor eye contact  Skin: warm, dry, tattoos    Laboratory results:    Results from last 7 days   Lab Units 20  0546 20  1641   SODIUM mmol/L 141 140   POTASSIUM mmol/L 4.3 4.3   CHLORIDE mmol/L 110* 107   CO2 mmol/L 24.3 22.4   BUN mg/dL 20 17   CREATININE mg/dL 0.71* 0.78   CALCIUM mg/dL 8.5* 8.8   BILIRUBIN mg/dL  --  1.4*   ALK PHOS U/L  --  157*   ALT (SGPT) U/L  --  197*   AST (SGOT) U/L  --  184*   GLUCOSE mg/dL 109* 120*     Results from last 7 days   Lab Units 20  0546 20  1641   WBC 10*3/mm3 14.44* 3.06*   HEMOGLOBIN g/dL 12.1* 13.3   HEMATOCRIT % 37.1* 40.0   PLATELETS 10*3/mm3 80* 96*     Results from last 7 days   Lab Units  11/30/20  1641   INR  1.26*     Results from last 7 days   Lab Units 11/30/20  1641   TROPONIN T ng/mL <0.010               I have reviewed the patient's laboratory results.    Radiology results:    Imaging Results (Last 24 Hours)     Procedure Component Value Units Date/Time    CT Chest Pulmonary Embolism [[id number]] Collected: 11/30/20 1915     Updated: 11/30/20 1918    Addenda:        ADDENDUM REPORT    ADDENDUM:  Multiple contiguous transaxial slices through the abdomen were  obtained after the intravenous ministration of contrast.  The  liver is low in attenuation and nodular in contour consistent  with cirrhosis.  There is mild ascites.  The spleen is enlarged.  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  The gallbladder, pancreas,  adrenal glands and kidneys are unremarkable.  The bowel gas  pattern is nonobstructive.  No evidence for appendicitis.  The  bladder is normal in contour.  Impression: Cirrhosis with mild  ascites, splenomegaly and portal venous hypertension    Authenticated by Marie Fournier MD on 11/30/2020 07:16:21 PM  Signed: 11/30/20 1916 by Marie Fournier MD    Narrative:      FINAL REPORT    TECHNIQUE:  Multiple contiguous transaxial slices of the chest were obtained  after the intravenous administration of contrast for a pulmonary  embolism protocol CT.    CLINICAL HISTORY:  IV DRUG USER, FEVER, HYPOXIA    FINDINGS:  Images are degraded by patient motion artifact.  There is normal  opacification of the pulmonary artery branches without evidence  for pulmonary embolism.  Aorta opacifies without aneurysm or  dissection.  Heart size is normal.  There is bilateral  atelectasis without infiltrate, mass or effusion.      Impression:      Negative for pulmonary embolism    Authenticated by Marie Fournier MD on 11/30/2020 07:17:08 PM    CT Abdomen Pelvis With Contrast [[id number]] Resulted: 11/30/20 1853     Updated: 11/30/20 1910    XR Chest 1 View [[id number]] Collected: 11/30/20 1530     Updated:  11/30/20 1532    Narrative:      PROCEDURE: XR CHEST 1 VW-     HISTORY: Vomiting, epigastric pain     COMPARISON: 05/26/2019.     FINDINGS: The heart is normal in size. The mediastinum is unremarkable.  The lungs are clear. There is no pneumothorax.  There are no acute  osseous abnormalities.       Impression:      No acute cardiopulmonary process.     Continued followup is recommended.     This report was finalized on 11/30/2020 3:30 PM by Swapna Burnham M.D..          I have reviewed the patient's radiology reports.    Medication Review:     I have reviewed the patients active and prn medications.       Sepsis with acute organ dysfunction without septic shock (CMS/HCC)      Assessment:  Rule out bacteremia, endocarditis, and associated sepsis  Fever  Lactic acidosis  Leukocytosis  Polysubstance abuse  Weakness  Lethargy  Hep C s/p treatment  Cirrhosis     Plan:     -Empiric IV vancomycin, follow cultures, echocardiogram if bacteremic  -continue PO Lasix, spironolactone, nadolol  -Anticipate discharge home this evening vs tomorrow AM if blood cultures are negative at 24 hours    Avis Fabian DO  12/01/20  08:12 EST    Dictated utilizing Dragon dictation.

## 2020-12-01 NOTE — ED NOTES
Spoke with house supervisor, Mary about admission. Currently attending to a Code Blue in the ED and a STEMI est 4 minute arrival time. Will assign bed once able.      Nova Weems  11/30/20 2019

## 2020-12-01 NOTE — PLAN OF CARE
Goal Outcome Evaluation:   Pt Lethargic responding to voice. Bed alarm activated to prevent fall.

## 2020-12-01 NOTE — ED NOTES
House Supervisor, Mary assigned bed assignment. Patient will be going to . Rn, Giancarlo notified.             Nova Weems  11/30/20 2031
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
## 2020-12-01 NOTE — ED NOTES
Called hospitalist, Dr. Caren Mccann PA-C. Stated he was in a patients room and would give a call back.       Nvoa Weems  11/30/20 1934

## 2020-12-01 NOTE — PROGRESS NOTES
Continued Stay Note   Leonid     Patient Name: Stephen Diallo  MRN: [id number]  Today's Date: 12/1/2020    Admit Date: 11/30/2020    Discharge Plan     Row Name 12/01/20 1204       Plan    Plan  Attempted to call pt in room but no answer.        Discharge Codes    No documentation.             Mary Hillman RN

## 2020-12-01 NOTE — H&P
[facility]   HISTORY AND PHYSICAL      Name:  Stephen Diallo   Age:  45 y.o.  Sex:  male  :  1975  MRN:  [id number]   Visit Number:  [id number]  Admission Date:  2020  Date Of Service:  20  Primary Care Physician:  Provider, No Known    Chief Complaint:      Weakness    History Of Presenting Illness:      45 M history of IV drug abuse, tobaccoism, hepatitis C status post treatment, incarceration who was brought to the ED after he was found sitting in the [facility] bathroom lethargic appearing.  He was found with needles beside him.  He admits to using IV methamphetamines as recently as 2 days ago, regular tobacco use 1 PPD, occasional marijuana use, denies alcohol use.  He rinses his needles and reuses them, denies licking them.  He reports feeling weak over the past few days, some dyspnea, and some fever.  He denies chest pain, abdominal pain, dysuria, or diarrhea.  In the ED, he was thought to be septic, given IVF, IV ceftriaxone empirically, and admitted for further evaluation.  Procalcitonin was elevated, CT chest abdomen pelvis did not reveal any acute findings other than cirrhosis and ascites.  He is asleep at the time of my evaluation, arousable, and does not open his eyes throughout the evaluation.  He was given Narcan in the ER.    Review Of Systems:     A full 10 point review of systems was performed and all pertinent positives and negatives are noted in the HPI.     Past Medical History:    Past Medical History:   Diagnosis Date   • Cancer (CMS/HCC)     skin       Past Surgical history:    History reviewed. No pertinent surgical history.    Social History:    Social History     Socioeconomic History   • Marital status: Single     Spouse name: Not on file   • Number of children: Not on file   • Years of education: Not on file   • Highest education level: Not on file   Tobacco Use   • Smoking status: Current Every Day Smoker     Packs/day: 1.00     Types:  Cigarettes   • Smokeless tobacco: Former User   Substance and Sexual Activity   • Alcohol use: No     Frequency: Never   • Drug use: Yes     Comment: suboxone for opiods       Family History:    History reviewed. No pertinent family history.    Allergies:      Patient has no known allergies.    Home Medications:    Prior to Admission Medications     Prescriptions Last Dose Informant Patient Reported? Taking?    acetaminophen (TYLENOL 8 HOUR) 650 MG 8 hr tablet 11/30/2020  No Yes    Take 1 tablet by mouth Every 8 (Eight) Hours As Needed for Mild Pain .    ibuprofen (ADVIL,MOTRIN) 600 MG tablet 11/30/2020  No Yes    Take 1 tablet by mouth Every 8 (Eight) Hours As Needed for Mild Pain .    albuterol sulfate  (90 Base) MCG/ACT inhaler Unknown  No No    Inhale 2 puffs Every 6 (Six) Hours As Needed for Wheezing or Shortness of Air.    buprenorphine-naloxone (SUBOXONE) 8-2 MG per SL tablet More than a month  Yes No    Place 1.5 tablets under the tongue Daily.    fluconazole (DIFLUCAN) 100 MG tablet   No No    Take 1 tablet by mouth Daily.             ED Medications:    Medications   sodium chloride 0.9 % flush 10 mL (has no administration in time range)   acetaminophen (TYLENOL) tablet 650 mg (650 mg Oral Given 11/30/20 1651)   sodium chloride 0.9 % flush 10 mL (has no administration in time range)   sodium chloride 0.9 % flush 10 mL (has no administration in time range)   acetaminophen (TYLENOL) tablet 650 mg (has no administration in time range)     Or   acetaminophen (TYLENOL) 160 MG/5ML solution 650 mg (has no administration in time range)     Or   acetaminophen (TYLENOL) suppository 650 mg (has no administration in time range)   ondansetron (ZOFRAN) injection 4 mg (has no administration in time range)   enoxaparin (LOVENOX) syringe 40 mg (has no administration in time range)   nicotine (NICODERM CQ) 21 MG/24HR patch 1 patch (has no administration in time range)   Pharmacy to dose vancomycin (has no  administration in time range)   furosemide (LASIX) tablet 20 mg (has no administration in time range)   spironolactone (ALDACTONE) tablet 50 mg (has no administration in time range)   nadolol (CORGARD) tablet 40 mg (has no administration in time range)   sodium chloride 0.9 % bolus 1,000 mL (0 mL Intravenous Stopped 11/30/20 1818)   ondansetron (ZOFRAN) injection 4 mg (4 mg Intravenous Given 11/30/20 1653)   naloxone (NARCAN) injection 0.4 mg (0.4 mg Intravenous Given 11/30/20 1655)   cefTRIAXone (ROCEPHIN) IVPB 1 g/50ml dextrose (premix) (0 g Intravenous Stopped 11/30/20 1745)   ibuprofen (ADVIL,MOTRIN) tablet 800 mg (800 mg Oral Given 11/30/20 1840)   sodium chloride 0.9 % bolus 1,000 mL (1,000 mL Intravenous New Bag 11/30/20 1835)   iopamidol (ISOVUE-300) 61 % injection 100 mL (100 mL Intravenous Given 11/30/20 1912)       Vital Signs:    Temp:  [99.5 °F (37.5 °C)-101.5 °F (38.6 °C)] 100.3 °F (37.9 °C)  Heart Rate:  [100-121] 100  Resp:  [16-22] 20  BP: (104-115)/(53-82) 107/54        11/30/20  1456 11/30/20 2107   Weight: 70.3 kg (155 lb) 68.5 kg (151 lb 0.2 oz)       Body mass index is 25.13 kg/m².    Physical Exam:  General: NAD, resting in bed asleep, arousable  HEENT: EOMI, NC/AT  Heart: regular  Lungs: nonlabored  Abdomen: Soft, nondistended, nontender  Extremities: No edema  Neurological: A&O x3, moves all extremities  Psychological: Poor eye contact  Skin: warm, dry, tattoos    Laboratory data:    I have reviewed the labs done in the emergency room.    Results from last 7 days   Lab Units 11/30/20  1641   SODIUM mmol/L 140   POTASSIUM mmol/L 4.3   CHLORIDE mmol/L 107   CO2 mmol/L 22.4   BUN mg/dL 17   CREATININE mg/dL 0.78   CALCIUM mg/dL 8.8   BILIRUBIN mg/dL 1.4*   ALK PHOS U/L 157*   ALT (SGPT) U/L 197*   AST (SGOT) U/L 184*   GLUCOSE mg/dL 120*     Results from last 7 days   Lab Units 11/30/20  1641   WBC 10*3/mm3 3.06*   HEMOGLOBIN g/dL 13.3   HEMATOCRIT % 40.0   PLATELETS 10*3/mm3 96*     Results  from last 7 days   Lab Units 11/30/20  1641   INR  1.26*     Results from last 7 days   Lab Units 11/30/20  1641   TROPONIN T ng/mL <0.010     Results from last 7 days   Lab Units 11/30/20  1641   PROBNP pg/mL 104.1         Results from last 7 days   Lab Units 11/30/20  1641   LIPASE U/L 21         Results from last 7 days   Lab Units 11/30/20  1832   COLOR UA  Yellow   GLUCOSE UA  Negative   KETONES UA  Negative   LEUKOCYTES UA  Negative   PH, URINE  5.5   BILIRUBIN UA  Negative   UROBILINOGEN UA  1.0 E.U./dL     Pain Management Panel     Pain Management Panel Latest Ref Rng & Units 11/30/2020 7/23/2019    AMPHETAMINES SCREEN, URINE Negative Positive(A) Negative    BARBITURATES SCREEN Negative Negative Negative    BENZODIAZEPINE SCREEN, URINE Negative Negative Negative    BUPRENORPHINEUR Negative Negative Positive(A)    COCAINE SCREEN, URINE Negative Negative Negative    METHADONE SCREEN, URINE Negative Negative Negative    METHAMPHETAMINEUR Negative Positive(A) Negative              EKG:      Sinus tachycardia, rate 117, QTc 351, no ischemic changes    Radiology:    Imaging Results (Last 72 Hours)     Procedure Component Value Units Date/Time    CT Chest Pulmonary Embolism [[id number]] Collected: 11/30/20 1915     Updated: 11/30/20 1918    Addenda:        ADDENDUM REPORT    ADDENDUM:  Multiple contiguous transaxial slices through the abdomen were  obtained after the intravenous ministration of contrast.  The  liver is low in attenuation and nodular in contour consistent  with cirrhosis.  There is mild ascites.  The spleen is enlarged.  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  The gallbladder, pancreas,  adrenal glands and kidneys are unremarkable.  The bowel gas  pattern is nonobstructive.  No evidence for appendicitis.  The  bladder is normal in contour.  Impression: Cirrhosis with mild  ascites, splenomegaly and portal venous hypertension    Authenticated by Marie Fournier MD on 11/30/2020 07:16:21 PM  Signed: 11/30/20  1916 by Marie Fournier MD    Narrative:      FINAL REPORT    TECHNIQUE:  Multiple contiguous transaxial slices of the chest were obtained  after the intravenous administration of contrast for a pulmonary  embolism protocol CT.    CLINICAL HISTORY:  IV DRUG USER, FEVER, HYPOXIA    FINDINGS:  Images are degraded by patient motion artifact.  There is normal  opacification of the pulmonary artery branches without evidence  for pulmonary embolism.  Aorta opacifies without aneurysm or  dissection.  Heart size is normal.  There is bilateral  atelectasis without infiltrate, mass or effusion.      Impression:      Negative for pulmonary embolism    Authenticated by Marie Fournier MD on 11/30/2020 07:17:08 PM    CT Abdomen Pelvis With Contrast [[id number]] Resulted: 11/30/20 1853     Updated: 11/30/20 1910    XR Chest 1 View [[id number]] Collected: 11/30/20 1530     Updated: 11/30/20 1532    Narrative:      PROCEDURE: XR CHEST 1 VW-     HISTORY: Vomiting, epigastric pain     COMPARISON: 05/26/2019.     FINDINGS: The heart is normal in size. The mediastinum is unremarkable.  The lungs are clear. There is no pneumothorax.  There are no acute  osseous abnormalities.       Impression:      No acute cardiopulmonary process.     Continued followup is recommended.     This report was finalized on 11/30/2020 3:30 PM by Swapna Burnham M.D..            Sepsis with acute organ dysfunction without septic shock (CMS/HCC)      Assessment:  Rule out bacteremia, endocarditis, and associated sepsis  Fever  Lactic acidosis  Polysubstance abuse  Weakness  Lethargy  Hep C s/p treatment  Cirrhosis    Plan:    -Empiric IV vancomycin, follow cultures, echocardiogram if bacteremic  -Start Lasix, spironolactone, nadolol  -Check HIV and hepatitis screen  -High risk, observation for now    Avis Fabian DO  11/30/20  22:39 EST    Dictated utilizing Dragon dictation.

## 2020-12-02 ENCOUNTER — HOSPITAL ENCOUNTER (INPATIENT)
Facility: HOSPITAL | Age: 45
End: 2020-12-02
Attending: EMERGENCY MEDICINE | Admitting: EMERGENCY MEDICINE

## 2020-12-02 ENCOUNTER — HOSPITAL ENCOUNTER (INPATIENT)
Facility: HOSPITAL | Age: 45
LOS: 1 days | Discharge: LEFT BEFORE DISCHARGE | End: 2020-12-03
Attending: EMERGENCY MEDICINE | Admitting: EMERGENCY MEDICINE

## 2020-12-02 VITALS
OXYGEN SATURATION: 100 % | BODY MASS INDEX: 25.16 KG/M2 | TEMPERATURE: 98 F | HEART RATE: 66 BPM | DIASTOLIC BLOOD PRESSURE: 64 MMHG | HEIGHT: 65 IN | WEIGHT: 151.01 LBS | SYSTOLIC BLOOD PRESSURE: 116 MMHG | RESPIRATION RATE: 18 BRPM

## 2020-12-02 PROBLEM — E87.20 LACTIC ACIDOSIS: Status: ACTIVE | Noted: 2020-12-02

## 2020-12-02 PROBLEM — F19.10 POLYSUBSTANCE ABUSE (HCC): Status: ACTIVE | Noted: 2020-12-02

## 2020-12-02 PROBLEM — B49 FUNGEMIA: Status: ACTIVE | Noted: 2020-12-02

## 2020-12-02 LAB
ALBUMIN SERPL-MCNC: 2.5 G/DL (ref 3.5–5.2)
ANION GAP SERPL CALCULATED.3IONS-SCNC: 4.5 MMOL/L (ref 5–15)
BASOPHILS # BLD AUTO: 0.02 10*3/MM3 (ref 0–0.2)
BASOPHILS NFR BLD AUTO: 0.2 % (ref 0–1.5)
BUN SERPL-MCNC: 19 MG/DL (ref 6–20)
BUN/CREAT SERPL: 30.2 (ref 7–25)
CALCIUM SPEC-SCNC: 8.7 MG/DL (ref 8.6–10.5)
CHLORIDE SERPL-SCNC: 108 MMOL/L (ref 98–107)
CO2 SERPL-SCNC: 22.5 MMOL/L (ref 22–29)
CREAT SERPL-MCNC: 0.63 MG/DL (ref 0.76–1.27)
DEPRECATED RDW RBC AUTO: 48.3 FL (ref 37–54)
EOSINOPHIL # BLD AUTO: 0.16 10*3/MM3 (ref 0–0.4)
EOSINOPHIL NFR BLD AUTO: 1.8 % (ref 0.3–6.2)
ERYTHROCYTE [DISTWIDTH] IN BLOOD BY AUTOMATED COUNT: 14.4 % (ref 12.3–15.4)
GFR SERPL CREATININE-BSD FRML MDRD: 138 ML/MIN/1.73
GLUCOSE SERPL-MCNC: 109 MG/DL (ref 65–99)
HCT VFR BLD AUTO: 38.5 % (ref 37.5–51)
HGB BLD-MCNC: 12.4 G/DL (ref 13–17.7)
IMM GRANULOCYTES # BLD AUTO: 0.07 10*3/MM3 (ref 0–0.05)
IMM GRANULOCYTES NFR BLD AUTO: 0.8 % (ref 0–0.5)
LYMPHOCYTES # BLD AUTO: 1.34 10*3/MM3 (ref 0.7–3.1)
LYMPHOCYTES NFR BLD AUTO: 14.7 % (ref 19.6–45.3)
MCH RBC QN AUTO: 29.3 PG (ref 26.6–33)
MCHC RBC AUTO-ENTMCNC: 32.2 G/DL (ref 31.5–35.7)
MCV RBC AUTO: 91 FL (ref 79–97)
MONOCYTES # BLD AUTO: 0.85 10*3/MM3 (ref 0.1–0.9)
MONOCYTES NFR BLD AUTO: 9.3 % (ref 5–12)
NEUTROPHILS NFR BLD AUTO: 6.7 10*3/MM3 (ref 1.7–7)
NEUTROPHILS NFR BLD AUTO: 73.2 % (ref 42.7–76)
NRBC BLD AUTO-RTO: 0 /100 WBC (ref 0–0.2)
PHOSPHATE SERPL-MCNC: 1.6 MG/DL (ref 2.5–4.5)
PLATELET # BLD AUTO: 85 10*3/MM3 (ref 140–450)
PMV BLD AUTO: 11.2 FL (ref 6–12)
POTASSIUM SERPL-SCNC: 4 MMOL/L (ref 3.5–5.2)
RBC # BLD AUTO: 4.23 10*6/MM3 (ref 4.14–5.8)
RBC MORPH BLD: NORMAL
SMALL PLATELETS BLD QL SMEAR: NORMAL
SODIUM SERPL-SCNC: 135 MMOL/L (ref 136–145)
WBC # BLD AUTO: 9.14 10*3/MM3 (ref 3.4–10.8)
WBC MORPH BLD: NORMAL

## 2020-12-02 PROCEDURE — 99217 PR OBSERVATION CARE DISCHARGE MANAGEMENT: CPT | Performed by: EMERGENCY MEDICINE

## 2020-12-02 PROCEDURE — 96372 THER/PROPH/DIAG INJ SC/IM: CPT

## 2020-12-02 PROCEDURE — 87040 BLOOD CULTURE FOR BACTERIA: CPT | Performed by: EMERGENCY MEDICINE

## 2020-12-02 PROCEDURE — 80053 COMPREHEN METABOLIC PANEL: CPT | Performed by: EMERGENCY MEDICINE

## 2020-12-02 PROCEDURE — 85025 COMPLETE CBC W/AUTO DIFF WBC: CPT | Performed by: EMERGENCY MEDICINE

## 2020-12-02 PROCEDURE — G0378 HOSPITAL OBSERVATION PER HR: HCPCS

## 2020-12-02 PROCEDURE — 80069 RENAL FUNCTION PANEL: CPT | Performed by: EMERGENCY MEDICINE

## 2020-12-02 PROCEDURE — 25010000002 MORPHINE SULFATE (PF) 2 MG/ML SOLUTION: Performed by: INTERNAL MEDICINE

## 2020-12-02 PROCEDURE — 85007 BL SMEAR W/DIFF WBC COUNT: CPT | Performed by: EMERGENCY MEDICINE

## 2020-12-02 PROCEDURE — 84145 PROCALCITONIN (PCT): CPT | Performed by: EMERGENCY MEDICINE

## 2020-12-02 PROCEDURE — 99223 1ST HOSP IP/OBS HIGH 75: CPT | Performed by: EMERGENCY MEDICINE

## 2020-12-02 PROCEDURE — 96376 TX/PRO/DX INJ SAME DRUG ADON: CPT

## 2020-12-02 PROCEDURE — 25010000002 ENOXAPARIN PER 10 MG: Performed by: EMERGENCY MEDICINE

## 2020-12-02 RX ORDER — ACETAMINOPHEN 325 MG/1
650 TABLET ORAL EVERY 6 HOURS PRN
Status: DISCONTINUED | OUTPATIENT
Start: 2020-12-02 | End: 2020-12-02

## 2020-12-02 RX ORDER — ACETAMINOPHEN 650 MG/1
650 SUPPOSITORY RECTAL EVERY 4 HOURS PRN
Status: DISCONTINUED | OUTPATIENT
Start: 2020-12-02 | End: 2020-12-02

## 2020-12-02 RX ORDER — SPIRONOLACTONE 25 MG/1
50 TABLET ORAL DAILY
Status: DISCONTINUED | OUTPATIENT
Start: 2020-12-03 | End: 2020-12-03 | Stop reason: HOSPADM

## 2020-12-02 RX ORDER — NICOTINE 21 MG/24HR
1 PATCH, TRANSDERMAL 24 HOURS TRANSDERMAL EVERY 24 HOURS
Status: DISCONTINUED | OUTPATIENT
Start: 2020-12-02 | End: 2020-12-03 | Stop reason: HOSPADM

## 2020-12-02 RX ORDER — ONDANSETRON 2 MG/ML
4 INJECTION INTRAMUSCULAR; INTRAVENOUS EVERY 6 HOURS PRN
Status: DISCONTINUED | OUTPATIENT
Start: 2020-12-02 | End: 2020-12-03 | Stop reason: HOSPADM

## 2020-12-02 RX ORDER — ONDANSETRON 4 MG/1
4 TABLET, ORALLY DISINTEGRATING ORAL EVERY 6 HOURS PRN
Status: DISCONTINUED | OUTPATIENT
Start: 2020-12-02 | End: 2020-12-03 | Stop reason: HOSPADM

## 2020-12-02 RX ORDER — SODIUM CHLORIDE 0.9 % (FLUSH) 0.9 %
10 SYRINGE (ML) INJECTION AS NEEDED
Status: DISCONTINUED | OUTPATIENT
Start: 2020-12-02 | End: 2020-12-03 | Stop reason: HOSPADM

## 2020-12-02 RX ORDER — FUROSEMIDE 20 MG/1
20 TABLET ORAL DAILY
Status: DISCONTINUED | OUTPATIENT
Start: 2020-12-03 | End: 2020-12-02

## 2020-12-02 RX ORDER — FUROSEMIDE 20 MG/1
20 TABLET ORAL DAILY
Status: DISCONTINUED | OUTPATIENT
Start: 2020-12-03 | End: 2020-12-03 | Stop reason: HOSPADM

## 2020-12-02 RX ORDER — SODIUM CHLORIDE 0.9 % (FLUSH) 0.9 %
10 SYRINGE (ML) INJECTION AS NEEDED
Status: DISCONTINUED | OUTPATIENT
Start: 2020-12-02 | End: 2020-12-02

## 2020-12-02 RX ORDER — SODIUM CHLORIDE 0.9 % (FLUSH) 0.9 %
10 SYRINGE (ML) INJECTION EVERY 12 HOURS SCHEDULED
Status: DISCONTINUED | OUTPATIENT
Start: 2020-12-02 | End: 2020-12-03 | Stop reason: HOSPADM

## 2020-12-02 RX ORDER — ACETAMINOPHEN 325 MG/1
650 TABLET ORAL EVERY 4 HOURS PRN
Status: DISCONTINUED | OUTPATIENT
Start: 2020-12-02 | End: 2020-12-02

## 2020-12-02 RX ORDER — ACETAMINOPHEN 325 MG/1
650 TABLET ORAL EVERY 4 HOURS PRN
Status: DISCONTINUED | OUTPATIENT
Start: 2020-12-02 | End: 2020-12-03 | Stop reason: HOSPADM

## 2020-12-02 RX ORDER — NADOLOL 40 MG/1
40 TABLET ORAL
Status: DISCONTINUED | OUTPATIENT
Start: 2020-12-03 | End: 2020-12-02

## 2020-12-02 RX ORDER — PROPRANOLOL HYDROCHLORIDE 20 MG/1
20 TABLET ORAL 2 TIMES DAILY
Status: DISCONTINUED | OUTPATIENT
Start: 2020-12-02 | End: 2020-12-03 | Stop reason: HOSPADM

## 2020-12-02 RX ORDER — FUROSEMIDE 20 MG/1
20 TABLET ORAL DAILY
Qty: 15 TABLET | Refills: 0 | Status: SHIPPED | OUTPATIENT
Start: 2020-12-03 | End: 2020-12-18

## 2020-12-02 RX ORDER — SPIRONOLACTONE 50 MG/1
50 TABLET, FILM COATED ORAL DAILY
Qty: 15 TABLET | Refills: 0 | Status: SHIPPED | OUTPATIENT
Start: 2020-12-03 | End: 2020-12-18

## 2020-12-02 RX ORDER — ACETAMINOPHEN 160 MG/5ML
650 SOLUTION ORAL EVERY 4 HOURS PRN
Status: DISCONTINUED | OUTPATIENT
Start: 2020-12-02 | End: 2020-12-02

## 2020-12-02 RX ORDER — SPIRONOLACTONE 25 MG/1
50 TABLET ORAL DAILY
Status: DISCONTINUED | OUTPATIENT
Start: 2020-12-03 | End: 2020-12-02

## 2020-12-02 RX ORDER — MORPHINE SULFATE 2 MG/ML
2 INJECTION, SOLUTION INTRAMUSCULAR; INTRAVENOUS EVERY 4 HOURS PRN
Status: DISCONTINUED | OUTPATIENT
Start: 2020-12-02 | End: 2020-12-03

## 2020-12-02 RX ORDER — NADOLOL 40 MG/1
40 TABLET ORAL
Qty: 15 TABLET | Refills: 0 | Status: SHIPPED | OUTPATIENT
Start: 2020-12-03 | End: 2020-12-02 | Stop reason: ALTCHOICE

## 2020-12-02 RX ADMIN — ACETAMINOPHEN 650 MG: 325 TABLET ORAL at 05:23

## 2020-12-02 RX ADMIN — SODIUM CHLORIDE, PRESERVATIVE FREE 10 ML: 5 INJECTION INTRAVENOUS at 09:22

## 2020-12-02 RX ADMIN — NADOLOL 40 MG: 40 TABLET ORAL at 09:15

## 2020-12-02 RX ADMIN — SPIRONOLACTONE 50 MG: 25 TABLET, FILM COATED ORAL at 09:15

## 2020-12-02 RX ADMIN — FUROSEMIDE 20 MG: 20 TABLET ORAL at 09:15

## 2020-12-02 RX ADMIN — MORPHINE SULFATE 2 MG: 2 INJECTION, SOLUTION INTRAMUSCULAR; INTRAVENOUS at 01:37

## 2020-12-02 RX ADMIN — DIBASIC SODIUM PHOSPHATE, MONOBASIC POTASSIUM PHOSPHATE AND MONOBASIC SODIUM PHOSPHATE 2 TABLET: 852; 155; 130 TABLET ORAL at 10:57

## 2020-12-02 RX ADMIN — ACETAMINOPHEN 650 MG: 325 TABLET, FILM COATED ORAL at 09:22

## 2020-12-02 RX ADMIN — MORPHINE SULFATE 2 MG: 2 INJECTION, SOLUTION INTRAMUSCULAR; INTRAVENOUS at 09:22

## 2020-12-02 RX ADMIN — MORPHINE SULFATE 2 MG: 2 INJECTION, SOLUTION INTRAMUSCULAR; INTRAVENOUS at 05:43

## 2020-12-02 RX ADMIN — ENOXAPARIN SODIUM 40 MG: 40 INJECTION SUBCUTANEOUS at 01:36

## 2020-12-02 NOTE — PLAN OF CARE
Goal Outcome Evaluation: Pt progressing. Pt c/o of chronic pain in his knees and back. Pt requesting prn pain medication.

## 2020-12-02 NOTE — CONSULTS
Brief Note:  Therapist contacted at 1153 by USMAN Ramirez on [facility] re: consult for patient discharging on this date.  At the time, therapist was in consult with another patient.  After returning the call to [facility] at 1218, was transferred to USMAN Wyman who states USMAN Ramirez called for her. Per USMAN Wyman, patient declining behavioral health consult at this time.  Per USMAN Wyman, Dr. Wyman made aware and agreeable.  Encouraged staff to call behavioral health if patient were to be open and agreeable to consultation and we would be happy to see him.    -Meg Godoy, [facility]
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
## 2020-12-02 NOTE — PROGRESS NOTES
Case Management Discharge Note           Provided Post Acute Provider List?: N/A  Provided Post Acute Provider Quality & Resource List?: N/A    Selected Continued Care - Discharged on 12/2/2020 Admission date: 11/30/2020 - Discharge disposition: Home or Self Care            Transportation Services  Private: Car    Final Discharge Disposition Code: 01 - home or self-care

## 2020-12-03 ENCOUNTER — HOSPITAL ENCOUNTER (INPATIENT)
Facility: HOSPITAL | Age: 45
LOS: 12 days | Discharge: LEFT BEFORE DISCHARGE | End: 2020-12-15
Attending: EMERGENCY MEDICINE | Admitting: FAMILY MEDICINE

## 2020-12-03 ENCOUNTER — APPOINTMENT (OUTPATIENT)
Dept: CARDIOLOGY | Facility: HOSPITAL | Age: 45
End: 2020-12-03

## 2020-12-03 VITALS
RESPIRATION RATE: 18 BRPM | DIASTOLIC BLOOD PRESSURE: 46 MMHG | HEART RATE: 61 BPM | HEIGHT: 65 IN | TEMPERATURE: 97.5 F | OXYGEN SATURATION: 100 % | WEIGHT: 151 LBS | SYSTOLIC BLOOD PRESSURE: 106 MMHG | BODY MASS INDEX: 25.16 KG/M2

## 2020-12-03 DIAGNOSIS — B49 FUNGEMIA: Primary | ICD-10-CM

## 2020-12-03 LAB
ALBUMIN SERPL-MCNC: 2.9 G/DL (ref 3.5–5.2)
ALBUMIN SERPL-MCNC: 3.2 G/DL (ref 3.5–5.2)
ALBUMIN/GLOB SERPL: 0.7 G/DL
ALBUMIN/GLOB SERPL: 0.7 G/DL
ALP SERPL-CCNC: 119 U/L (ref 39–117)
ALP SERPL-CCNC: 123 U/L (ref 39–117)
ALT SERPL W P-5'-P-CCNC: 160 U/L (ref 1–41)
ALT SERPL W P-5'-P-CCNC: 168 U/L (ref 1–41)
AMPHET+METHAMPHET UR QL: POSITIVE
AMPHETAMINES UR QL: POSITIVE
ANION GAP SERPL CALCULATED.3IONS-SCNC: 6.1 MMOL/L (ref 5–15)
ANION GAP SERPL CALCULATED.3IONS-SCNC: 8.9 MMOL/L (ref 5–15)
AST SERPL-CCNC: 147 U/L (ref 1–40)
AST SERPL-CCNC: 158 U/L (ref 1–40)
BARBITURATES UR QL SCN: NEGATIVE
BASOPHILS # BLD AUTO: 0.03 10*3/MM3 (ref 0–0.2)
BASOPHILS # BLD AUTO: 0.03 10*3/MM3 (ref 0–0.2)
BASOPHILS NFR BLD AUTO: 0.4 % (ref 0–1.5)
BASOPHILS NFR BLD AUTO: 0.5 % (ref 0–1.5)
BENZODIAZ UR QL SCN: NEGATIVE
BH CV ECHO MEAS - IVSD: 0.9 CM
BH CV ECHO MEAS - LA DIMENSION: 3.2 CM
BH CV ECHO MEAS - LVPWD: 0.9 CM
BILIRUB SERPL-MCNC: 0.8 MG/DL (ref 0–1.2)
BILIRUB SERPL-MCNC: 0.8 MG/DL (ref 0–1.2)
BUN SERPL-MCNC: 16 MG/DL (ref 6–20)
BUN SERPL-MCNC: 16 MG/DL (ref 6–20)
BUN/CREAT SERPL: 21.1 (ref 7–25)
BUN/CREAT SERPL: 21.6 (ref 7–25)
BUPRENORPHINE SERPL-MCNC: NEGATIVE NG/ML
CALCIUM SPEC-SCNC: 8.9 MG/DL (ref 8.6–10.5)
CALCIUM SPEC-SCNC: 9.2 MG/DL (ref 8.6–10.5)
CANNABINOIDS SERPL QL: NEGATIVE
CHLORIDE SERPL-SCNC: 106 MMOL/L (ref 98–107)
CHLORIDE SERPL-SCNC: 107 MMOL/L (ref 98–107)
CO2 SERPL-SCNC: 25.1 MMOL/L (ref 22–29)
CO2 SERPL-SCNC: 27.9 MMOL/L (ref 22–29)
COCAINE UR QL: NEGATIVE
CREAT SERPL-MCNC: 0.74 MG/DL (ref 0.76–1.27)
CREAT SERPL-MCNC: 0.76 MG/DL (ref 0.76–1.27)
D-LACTATE SERPL-SCNC: 1.9 MMOL/L (ref 0.5–2)
DEPRECATED RDW RBC AUTO: 46.2 FL (ref 37–54)
DEPRECATED RDW RBC AUTO: 48.2 FL (ref 37–54)
EOSINOPHIL # BLD AUTO: 0.2 10*3/MM3 (ref 0–0.4)
EOSINOPHIL # BLD AUTO: 0.21 10*3/MM3 (ref 0–0.4)
EOSINOPHIL NFR BLD AUTO: 2.5 % (ref 0.3–6.2)
EOSINOPHIL NFR BLD AUTO: 3.2 % (ref 0.3–6.2)
ERYTHROCYTE [DISTWIDTH] IN BLOOD BY AUTOMATED COUNT: 14.1 % (ref 12.3–15.4)
ERYTHROCYTE [DISTWIDTH] IN BLOOD BY AUTOMATED COUNT: 14.3 % (ref 12.3–15.4)
GFR SERPL CREATININE-BSD FRML MDRD: 111 ML/MIN/1.73
GFR SERPL CREATININE-BSD FRML MDRD: 115 ML/MIN/1.73
GLOBULIN UR ELPH-MCNC: 4.2 GM/DL
GLOBULIN UR ELPH-MCNC: 4.3 GM/DL
GLUCOSE SERPL-MCNC: 105 MG/DL (ref 65–99)
GLUCOSE SERPL-MCNC: 99 MG/DL (ref 65–99)
HCT VFR BLD AUTO: 37 % (ref 37.5–51)
HCT VFR BLD AUTO: 40 % (ref 37.5–51)
HGB BLD-MCNC: 12.1 G/DL (ref 13–17.7)
HGB BLD-MCNC: 13.1 G/DL (ref 13–17.7)
IMM GRANULOCYTES # BLD AUTO: 0.03 10*3/MM3 (ref 0–0.05)
IMM GRANULOCYTES # BLD AUTO: 0.06 10*3/MM3 (ref 0–0.05)
IMM GRANULOCYTES NFR BLD AUTO: 0.4 % (ref 0–0.5)
IMM GRANULOCYTES NFR BLD AUTO: 0.9 % (ref 0–0.5)
LEFT ATRIUM VOLUME INDEX: 30 ML/M2
LV EF 2D ECHO EST: 66 %
LYMPHOCYTES # BLD AUTO: 1.8 10*3/MM3 (ref 0.7–3.1)
LYMPHOCYTES # BLD AUTO: 2.01 10*3/MM3 (ref 0.7–3.1)
LYMPHOCYTES NFR BLD AUTO: 22.8 % (ref 19.6–45.3)
LYMPHOCYTES NFR BLD AUTO: 30.8 % (ref 19.6–45.3)
MAXIMAL PREDICTED HEART RATE: 176 BPM
MCH RBC QN AUTO: 29.4 PG (ref 26.6–33)
MCH RBC QN AUTO: 29.9 PG (ref 26.6–33)
MCHC RBC AUTO-ENTMCNC: 32.7 G/DL (ref 31.5–35.7)
MCHC RBC AUTO-ENTMCNC: 32.8 G/DL (ref 31.5–35.7)
MCV RBC AUTO: 90 FL (ref 79–97)
MCV RBC AUTO: 91.3 FL (ref 79–97)
METHADONE UR QL SCN: NEGATIVE
MONOCYTES # BLD AUTO: 0.73 10*3/MM3 (ref 0.1–0.9)
MONOCYTES # BLD AUTO: 0.75 10*3/MM3 (ref 0.1–0.9)
MONOCYTES NFR BLD AUTO: 11.2 % (ref 5–12)
MONOCYTES NFR BLD AUTO: 9.5 % (ref 5–12)
NEUTROPHILS NFR BLD AUTO: 3.49 10*3/MM3 (ref 1.7–7)
NEUTROPHILS NFR BLD AUTO: 5.08 10*3/MM3 (ref 1.7–7)
NEUTROPHILS NFR BLD AUTO: 53.4 % (ref 42.7–76)
NEUTROPHILS NFR BLD AUTO: 64.4 % (ref 42.7–76)
NRBC BLD AUTO-RTO: 0 /100 WBC (ref 0–0.2)
NRBC BLD AUTO-RTO: 0 /100 WBC (ref 0–0.2)
OPIATES UR QL: POSITIVE
OXYCODONE UR QL SCN: NEGATIVE
PCP UR QL SCN: NEGATIVE
PLATELET # BLD AUTO: 121 10*3/MM3 (ref 140–450)
PLATELET # BLD AUTO: 133 10*3/MM3 (ref 140–450)
PMV BLD AUTO: 11.3 FL (ref 6–12)
PMV BLD AUTO: 11.4 FL (ref 6–12)
POTASSIUM SERPL-SCNC: 3.8 MMOL/L (ref 3.5–5.2)
POTASSIUM SERPL-SCNC: 4 MMOL/L (ref 3.5–5.2)
PROCALCITONIN SERPL-MCNC: 22.93 NG/ML (ref 0–0.25)
PROCALCITONIN SERPL-MCNC: 9.42 NG/ML (ref 0–0.25)
PROPOXYPH UR QL: NEGATIVE
PROT SERPL-MCNC: 7.1 G/DL (ref 6–8.5)
PROT SERPL-MCNC: 7.5 G/DL (ref 6–8.5)
RBC # BLD AUTO: 4.11 10*6/MM3 (ref 4.14–5.8)
RBC # BLD AUTO: 4.38 10*6/MM3 (ref 4.14–5.8)
RBC MORPH BLD: NORMAL
SMALL PLATELETS BLD QL SMEAR: ADEQUATE
SODIUM SERPL-SCNC: 140 MMOL/L (ref 136–145)
SODIUM SERPL-SCNC: 141 MMOL/L (ref 136–145)
STRESS TARGET HR: 150 BPM
TRICYCLICS UR QL SCN: NEGATIVE
WBC # BLD AUTO: 6.53 10*3/MM3 (ref 3.4–10.8)
WBC # BLD AUTO: 7.89 10*3/MM3 (ref 3.4–10.8)
WBC MORPH BLD: NORMAL

## 2020-12-03 PROCEDURE — 99283 EMERGENCY DEPT VISIT LOW MDM: CPT

## 2020-12-03 PROCEDURE — 80053 COMPREHEN METABOLIC PANEL: CPT | Performed by: EMERGENCY MEDICINE

## 2020-12-03 PROCEDURE — 93306 TTE W/DOPPLER COMPLETE: CPT | Performed by: INTERNAL MEDICINE

## 2020-12-03 PROCEDURE — 99239 HOSP IP/OBS DSCHRG MGMT >30: CPT | Performed by: EMERGENCY MEDICINE

## 2020-12-03 PROCEDURE — 85025 COMPLETE CBC W/AUTO DIFF WBC: CPT | Performed by: EMERGENCY MEDICINE

## 2020-12-03 PROCEDURE — 84145 PROCALCITONIN (PCT): CPT | Performed by: EMERGENCY MEDICINE

## 2020-12-03 PROCEDURE — 25010000002 MORPHINE SULFATE (PF) 2 MG/ML SOLUTION: Performed by: EMERGENCY MEDICINE

## 2020-12-03 PROCEDURE — 25010000002 ENOXAPARIN PER 10 MG: Performed by: EMERGENCY MEDICINE

## 2020-12-03 PROCEDURE — 93306 TTE W/DOPPLER COMPLETE: CPT

## 2020-12-03 PROCEDURE — 25010000002 KETOROLAC TROMETHAMINE PER 15 MG: Performed by: EMERGENCY MEDICINE

## 2020-12-03 PROCEDURE — 83605 ASSAY OF LACTIC ACID: CPT | Performed by: EMERGENCY MEDICINE

## 2020-12-03 PROCEDURE — 80306 DRUG TEST PRSMV INSTRMNT: CPT | Performed by: EMERGENCY MEDICINE

## 2020-12-03 RX ORDER — TRAMADOL HYDROCHLORIDE 50 MG/1
50 TABLET ORAL EVERY 6 HOURS PRN
Status: DISCONTINUED | OUTPATIENT
Start: 2020-12-03 | End: 2020-12-03 | Stop reason: HOSPADM

## 2020-12-03 RX ORDER — KETOROLAC TROMETHAMINE 30 MG/ML
15 INJECTION, SOLUTION INTRAMUSCULAR; INTRAVENOUS EVERY 6 HOURS PRN
Status: DISCONTINUED | OUTPATIENT
Start: 2020-12-03 | End: 2020-12-03 | Stop reason: HOSPADM

## 2020-12-03 RX ADMIN — SPIRONOLACTONE 50 MG: 25 TABLET, FILM COATED ORAL at 08:57

## 2020-12-03 RX ADMIN — FUROSEMIDE 20 MG: 20 TABLET ORAL at 08:57

## 2020-12-03 RX ADMIN — SODIUM CHLORIDE, PRESERVATIVE FREE 10 ML: 5 INJECTION INTRAVENOUS at 08:57

## 2020-12-03 RX ADMIN — TRAMADOL HYDROCHLORIDE 50 MG: 50 TABLET, FILM COATED ORAL at 08:58

## 2020-12-03 RX ADMIN — SODIUM CHLORIDE, PRESERVATIVE FREE 10 ML: 5 INJECTION INTRAVENOUS at 00:24

## 2020-12-03 RX ADMIN — PROPRANOLOL HYDROCHLORIDE 20 MG: 20 TABLET ORAL at 00:22

## 2020-12-03 RX ADMIN — KETOROLAC TROMETHAMINE 15 MG: 30 INJECTION, SOLUTION INTRAMUSCULAR at 11:29

## 2020-12-03 RX ADMIN — DIBASIC SODIUM PHOSPHATE, MONOBASIC POTASSIUM PHOSPHATE AND MONOBASIC SODIUM PHOSPHATE 2 TABLET: 852; 155; 130 TABLET ORAL at 00:22

## 2020-12-03 RX ADMIN — MORPHINE SULFATE 2 MG: 2 INJECTION, SOLUTION INTRAMUSCULAR; INTRAVENOUS at 00:34

## 2020-12-03 RX ADMIN — PROPRANOLOL HYDROCHLORIDE 20 MG: 20 TABLET ORAL at 08:57

## 2020-12-03 RX ADMIN — ENOXAPARIN SODIUM 40 MG: 40 INJECTION SUBCUTANEOUS at 00:34

## 2020-12-03 RX ADMIN — DIBASIC SODIUM PHOSPHATE, MONOBASIC POTASSIUM PHOSPHATE AND MONOBASIC SODIUM PHOSPHATE 2 TABLET: 852; 155; 130 TABLET ORAL at 08:57

## 2020-12-03 RX ADMIN — DIBASIC SODIUM PHOSPHATE, MONOBASIC POTASSIUM PHOSPHATE AND MONOBASIC SODIUM PHOSPHATE 2 TABLET: 852; 155; 130 TABLET ORAL at 11:29

## 2020-12-03 RX ADMIN — MICAFUNGIN SODIUM 100 MG: 20 INJECTION, POWDER, LYOPHILIZED, FOR SOLUTION INTRAVENOUS at 00:21

## 2020-12-03 RX ADMIN — MORPHINE SULFATE 2 MG: 2 INJECTION, SOLUTION INTRAMUSCULAR; INTRAVENOUS at 05:54

## 2020-12-03 RX ADMIN — Medication 1 PATCH: at 00:22
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
## 2020-12-03 NOTE — H&P
[facility]   HISTORY AND PHYSICAL      Name:  Stephen Diallo   Age:  45 y.o.  Sex:  male  :  1975  MRN:  [id number]   Visit Number:  [id number]  Admission Date:  2020  Date Of Service:  20  Primary Care Physician:  Provider, No Known    Chief Complaint:   + blood culture    History Of Presenting Illness:      45 M h/o IVDA, tobaccoism, hepatitis C status post treatment, incarceration who was brought to the ED after he was found sitting in the [employer] bathroom with needles beside him very lethargic.  He triggered sepsis criteria upon arrival in the ED 2 days ago, blood cultures took 48 hours to grow fungus in 1 bottle.  Patient was discharged earlier yday, and called back to return for treatment. Susceptibilities and specificities are pending. ID consultation with  was obtained and they recommended ophthalmology follow-up outpatient if no particular visual symptoms, echocardiogram to rule out endocarditis. He received ceftriaxone in the ED, and vancomycin while inpatient over the past 2 days. Procalcitonin was elevated, CT chest abdomen pelvis did not reveal any acute findings other than cirrhosis and ascites. Pt admits to hurting/aching all over diffuse pattern, he denies visual symptoms. He works as a  and thinks the soil in his fingernails are to blame.    Review Of Systems:     A full 10 point review of systems was performed and all pertinent positives and negatives are noted in the HPI.     Past Medical History:    Past Medical History:   Diagnosis Date   • Cancer (CMS/HCC)     skin       Past Surgical history:    No past surgical history on file.    Social History:    Social History     Socioeconomic History   • Marital status: Single     Spouse name: Not on file   • Number of children: Not on file   • Years of education: Not on file   • Highest education level: Not on file   Tobacco Use   • Smoking status: Current Every Day Smoker     Packs/day: 1.00      Types: Cigarettes   • Smokeless tobacco: Former User   Substance and Sexual Activity   • Alcohol use: No     Frequency: Never   • Drug use: Yes     Comment: suboxone for opiods       Family History:    No family history on file.    Allergies:      Patient has no known allergies.    Home Medications:    Prior to Admission Medications     Prescriptions Last Dose Informant Patient Reported? Taking?    acetaminophen (TYLENOL 8 HOUR) 650 MG 8 hr tablet 12/2/2020  No Yes    Take 1 tablet by mouth Every 8 (Eight) Hours As Needed for Mild Pain .    albuterol sulfate  (90 Base) MCG/ACT inhaler Past Week  No Yes    Inhale 2 puffs Every 6 (Six) Hours As Needed for Wheezing or Shortness of Air.    furosemide (LASIX) 20 MG tablet 12/3/2020  No Yes    Take 1 tablet by mouth Daily    propranolol (INDERAL) 20 MG tablet 12/3/2020  No Yes    Take 1 tablet by mouth 2 (two) times a day.    spironolactone (ALDACTONE) 50 MG tablet 12/3/2020  No Yes    Take 1 tablet by mouth Daily             ED Medications:    Medications   propranolol (INDERAL) tablet 20 mg (20 mg Oral Given 12/3/20 0022)   sodium chloride 0.9 % flush 10 mL ( Intravenous Canceled Entry 12/3/20 0024)   acetaminophen (TYLENOL) tablet 650 mg (has no administration in time range)   ondansetron (ZOFRAN) injection 4 mg (has no administration in time range)   enoxaparin (LOVENOX) syringe 40 mg (has no administration in time range)   nicotine (NICODERM CQ) 21 MG/24HR patch 1 patch (1 patch Transdermal Medication Applied 12/3/20 0022)   furosemide (LASIX) tablet 20 mg (has no administration in time range)   spironolactone (ALDACTONE) tablet 50 mg (has no administration in time range)   Morphine sulfate (PF) injection 2 mg (has no administration in time range)   ondansetron ODT (ZOFRAN-ODT) disintegrating tablet 4 mg (has no administration in time range)   phosphorus (K PHOS NEUTRAL) tablet 2 tablet (2 tablets Oral Given 12/3/20 0022)   sodium chloride 0.9 % flush 10 mL  (10 mL Intravenous Given 12/3/20 0024)   sodium chloride 0.9 % flush 10 mL (has no administration in time range)   micafungin 100 mg/100 mL 0.9% NS IVPB (mbp) (100 mg Intravenous New Bag 12/3/20 0021)       Vital Signs:    Temp:  [97.7 °F (36.5 °C)-98.1 °F (36.7 °C)] 98.1 °F (36.7 °C)  Heart Rate:  [66-70] 70  Resp:  [18] 18  BP: ()/(50-69) 108/63        12/02/20 2228   Weight: 68.5 kg (151 lb)       Body mass index is 25.13 kg/m².    Physical Exam:  General: NAD  HEENT: EOMI, NC/AT  Heart: regular  Lungs: nonlabored  Abdomen: Soft, nondistended, nontender  Extremities: No edema  Neurological: A&O x3, moves all extremities  Psychological:good eye contact  Skin: warm, dry, tattoos    Laboratory data:    I have reviewed the labs done in the emergency room.    Results from last 7 days   Lab Units 12/02/20 0745 12/01/20 0546 11/30/20  1641   SODIUM mmol/L 135* 141 140   POTASSIUM mmol/L 4.0 4.3 4.3   CHLORIDE mmol/L 108* 110* 107   CO2 mmol/L 22.5 24.3 22.4   BUN mg/dL 19 20 17   CREATININE mg/dL 0.63* 0.71* 0.78   CALCIUM mg/dL 8.7 8.5* 8.8   BILIRUBIN mg/dL  --   --  1.4*   ALK PHOS U/L  --   --  157*   ALT (SGPT) U/L  --   --  197*   AST (SGOT) U/L  --   --  184*   GLUCOSE mg/dL 109* 109* 120*     Results from last 7 days   Lab Units 12/02/20 2328 12/02/20 0745 12/01/20  0546   WBC 10*3/mm3 7.89 9.14 14.44*   HEMOGLOBIN g/dL 13.1 12.4* 12.1*   HEMATOCRIT % 40.0 38.5 37.1*   PLATELETS 10*3/mm3 121* 85* 80*     Results from last 7 days   Lab Units 11/30/20  1641   INR  1.26*     Results from last 7 days   Lab Units 11/30/20  1641   TROPONIN T ng/mL <0.010     Results from last 7 days   Lab Units 11/30/20  1641   PROBNP pg/mL 104.1         Results from last 7 days   Lab Units 11/30/20  1641   LIPASE U/L 21         Results from last 7 days   Lab Units 11/30/20  1832   COLOR UA  Yellow   GLUCOSE UA  Negative   KETONES UA  Negative   LEUKOCYTES UA  Negative   PH, URINE  5.5   BILIRUBIN UA  Negative    UROBILINOGEN UA  1.0 E.U./dL     Pain Management Panel     Pain Management Panel Latest Ref Rng & Units 11/30/2020 7/23/2019    AMPHETAMINES SCREEN, URINE Negative Positive(A) Negative    BARBITURATES SCREEN Negative Negative Negative    BENZODIAZEPINE SCREEN, URINE Negative Negative Negative    BUPRENORPHINEUR Negative Negative Positive(A)    COCAINE SCREEN, URINE Negative Negative Negative    METHADONE SCREEN, URINE Negative Negative Negative    METHAMPHETAMINEUR Negative Positive(A) Negative        Results from last 7 days   Lab Units 11/30/20  1655 11/30/20  1650   BLOODCX  Fungus (Organism type)* No growth at 2 days       EKG:      From 2 days ago 'Sinus tachycardia, rate 117, QTc 351, no ischemic changes'    Radiology:    Imaging Results (Last 72 Hours)     ** No results found for the last 72 hours. **            Fungemia    Lactic acidosis    Polysubstance abuse (CMS/HCC)      Assessment:  Fungemia, with associated sepsis   Rule out endocarditis  Thrombocytopenia likely sepsis mediated versus cirrhosis  Polysubstance abuse  Hep C s/p treatment complicated by cirrhosis     Plan:     -given fluid bolus, cultures obtained last visit which grew out fungus sensitivities and specificity is pending  -Empiric IV micafungin with plan to de-escalate to p.o. fluconazole high-dose if susceptible   -Echocardiogram pending   -Ophthalmology follow-up outpatient   -continue PO Lasix, spironolactone, nadolol  -high risk, inpatient, full code    Avis Fabian DO  12/03/20  00:25 EST    Dictated utilizing Dragon dictation.
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
## 2020-12-03 NOTE — PAYOR COMM NOTE
"TO:P[facility]SPORT  FROM:CHRIS TRUJILLO RN PHONE [phone]  INPT NOTIFICATION AND CLINICALS    Stephen Sharif (45 y.o. Male)     Date of Birth Social Security Number Address Home Phone MRN    1975  [address] [facility] [phone] [id number]    Jehovah's witness Marital Status          None Single       Admission Date Admission Type Admitting Provider Attending Provider Department, Room/Bed    20 Urgent Avis Fabian DO Bhatti, Umar, DO [facility] MED SURG  4, 411/1    Discharge Date Discharge Disposition Discharge Destination                       Attending Provider: Avis Fabian DO    Allergies: No Known Allergies    Isolation: None   Infection: None   Code Status: CPR    Ht: 165.1 cm (65\")   Wt: 68.5 kg (151 lb)    Admission Cmt: None   Principal Problem: Fungemia [B49]                 Active Insurance as of 2020     Primary Coverage     Payor Plan Insurance Group Employer/Plan Group    [id number]     Payor Plan Address Payor Plan Phone Number Payor Plan Fax Number Effective Dates    [address] [phone]  10/1/2015 - None Entered    [facility] [id number]       Subscriber Name Subscriber Birth Date Member ID       STEPHEN SHARIF 1975 [id number]                 Emergency Contacts      (Rel.) Home Phone Work Phone Mobile Phone    Kelly Lewis (Sister) [phone] -- --    Sandra Simpson (Mother) [phone] -- --               History & Physical      Avis Fabian DO at 20 0024              [facility] HOSPITALIST   HISTORY AND PHYSICAL      Name:  Stephen Sharif   Age:  45 y.o.  Sex:  male  :  1975  MRN:  [id number]   Visit Number:  [id number]  Admission Date:  2020  Date Of Service:  20  Primary Care Physician:  Provider, No Known    Chief Complaint:   + blood culture    History Of Presenting Illness:      45 M h/o IVDA, tobaccoism, hepatitis C status post treatment, " incarceration who was brought to the ED after he was found sitting in the [employer] bathroom with needles beside him very lethargic.  He triggered sepsis criteria upon arrival in the ED 2 days ago, blood cultures took 48 hours to grow fungus in 1 bottle.  Patient was discharged earlier yday, and called back to return for treatment. Susceptibilities and specificities are pending. ID consultation with [facility] was obtained and they recommended ophthalmology follow-up outpatient if no particular visual symptoms, echocardiogram to rule out endocarditis. He received ceftriaxone in the ED, and vancomycin while inpatient over the past 2 days. Procalcitonin was elevated, CT chest abdomen pelvis did not reveal any acute findings other than cirrhosis and ascites. Pt admits to hurting/aching all over diffuse pattern, he denies visual symptoms. He works as a  and thinks the soil in his fingernails are to blame.    Review Of Systems:     A full 10 point review of systems was performed and all pertinent positives and negatives are noted in the HPI.     Past Medical History:    Past Medical History:   Diagnosis Date   • Cancer (CMS/HCC)     skin       Past Surgical history:    No past surgical history on file.    Social History:    Social History     Socioeconomic History   • Marital status: Single     Spouse name: Not on file   • Number of children: Not on file   • Years of education: Not on file   • Highest education level: Not on file   Tobacco Use   • Smoking status: Current Every Day Smoker     Packs/day: 1.00     Types: Cigarettes   • Smokeless tobacco: Former User   Substance and Sexual Activity   • Alcohol use: No     Frequency: Never   • Drug use: Yes     Comment: suboxone for opiods       Family History:    No family history on file.    Allergies:      Patient has no known allergies.    Home Medications:    Prior to Admission Medications     Prescriptions Last Dose Informant Patient Reported? Taking?    acetaminophen  (TYLENOL 8 HOUR) 650 MG 8 hr tablet 12/2/2020  No Yes    Take 1 tablet by mouth Every 8 (Eight) Hours As Needed for Mild Pain .    albuterol sulfate  (90 Base) MCG/ACT inhaler Past Week  No Yes    Inhale 2 puffs Every 6 (Six) Hours As Needed for Wheezing or Shortness of Air.    furosemide (LASIX) 20 MG tablet 12/3/2020  No Yes    Take 1 tablet by mouth Daily    propranolol (INDERAL) 20 MG tablet 12/3/2020  No Yes    Take 1 tablet by mouth 2 (two) times a day.    spironolactone (ALDACTONE) 50 MG tablet 12/3/2020  No Yes    Take 1 tablet by mouth Daily             ED Medications:    Medications   propranolol (INDERAL) tablet 20 mg (20 mg Oral Given 12/3/20 0022)   sodium chloride 0.9 % flush 10 mL ( Intravenous Canceled Entry 12/3/20 0024)   acetaminophen (TYLENOL) tablet 650 mg (has no administration in time range)   ondansetron (ZOFRAN) injection 4 mg (has no administration in time range)   enoxaparin (LOVENOX) syringe 40 mg (has no administration in time range)   nicotine (NICODERM CQ) 21 MG/24HR patch 1 patch (1 patch Transdermal Medication Applied 12/3/20 0022)   furosemide (LASIX) tablet 20 mg (has no administration in time range)   spironolactone (ALDACTONE) tablet 50 mg (has no administration in time range)   Morphine sulfate (PF) injection 2 mg (has no administration in time range)   ondansetron ODT (ZOFRAN-ODT) disintegrating tablet 4 mg (has no administration in time range)   phosphorus (K PHOS NEUTRAL) tablet 2 tablet (2 tablets Oral Given 12/3/20 0022)   sodium chloride 0.9 % flush 10 mL (10 mL Intravenous Given 12/3/20 0024)   sodium chloride 0.9 % flush 10 mL (has no administration in time range)   micafungin 100 mg/100 mL 0.9% NS IVPB (mbp) (100 mg Intravenous New Bag 12/3/20 0021)       Vital Signs:    Temp:  [97.7 °F (36.5 °C)-98.1 °F (36.7 °C)] 98.1 °F (36.7 °C)  Heart Rate:  [66-70] 70  Resp:  [18] 18  BP: ()/(50-69) 108/63        12/02/20  2228   Weight: 68.5 kg (151 lb)       Body  mass index is 25.13 kg/m².    Physical Exam:  General: NAD  HEENT: EOMI, NC/AT  Heart: regular  Lungs: nonlabored  Abdomen: Soft, nondistended, nontender  Extremities: No edema  Neurological: A&O x3, moves all extremities  Psychological:good eye contact  Skin: warm, dry, tattoos    Laboratory data:    I have reviewed the labs done in the emergency room.    Results from last 7 days   Lab Units 12/02/20  0745 12/01/20  0546 11/30/20  1641   SODIUM mmol/L 135* 141 140   POTASSIUM mmol/L 4.0 4.3 4.3   CHLORIDE mmol/L 108* 110* 107   CO2 mmol/L 22.5 24.3 22.4   BUN mg/dL 19 20 17   CREATININE mg/dL 0.63* 0.71* 0.78   CALCIUM mg/dL 8.7 8.5* 8.8   BILIRUBIN mg/dL  --   --  1.4*   ALK PHOS U/L  --   --  157*   ALT (SGPT) U/L  --   --  197*   AST (SGOT) U/L  --   --  184*   GLUCOSE mg/dL 109* 109* 120*     Results from last 7 days   Lab Units 12/02/20  2328 12/02/20  0745 12/01/20  0546   WBC 10*3/mm3 7.89 9.14 14.44*   HEMOGLOBIN g/dL 13.1 12.4* 12.1*   HEMATOCRIT % 40.0 38.5 37.1*   PLATELETS 10*3/mm3 121* 85* 80*     Results from last 7 days   Lab Units 11/30/20  1641   INR  1.26*     Results from last 7 days   Lab Units 11/30/20  1641   TROPONIN T ng/mL <0.010     Results from last 7 days   Lab Units 11/30/20  1641   PROBNP pg/mL 104.1         Results from last 7 days   Lab Units 11/30/20  1641   LIPASE U/L 21         Results from last 7 days   Lab Units 11/30/20  1832   COLOR UA  Yellow   GLUCOSE UA  Negative   KETONES UA  Negative   LEUKOCYTES UA  Negative   PH, URINE  5.5   BILIRUBIN UA  Negative   UROBILINOGEN UA  1.0 E.U./dL     Pain Management Panel     Pain Management Panel Latest Ref Rng & Units 11/30/2020 7/23/2019    AMPHETAMINES SCREEN, URINE Negative Positive(A) Negative    BARBITURATES SCREEN Negative Negative Negative    BENZODIAZEPINE SCREEN, URINE Negative Negative Negative    BUPRENORPHINEUR Negative Negative Positive(A)    COCAINE SCREEN, URINE Negative Negative Negative    METHADONE SCREEN, URINE  Negative Negative Negative    METHAMPHETAMINEUR Negative Positive(A) Negative        Results from last 7 days   Lab Units 11/30/20  1655 11/30/20  1650   BLOODCX  Fungus (Organism type)* No growth at 2 days       EKG:      From 2 days ago 'Sinus tachycardia, rate 117, QTc 351, no ischemic changes'    Radiology:    Imaging Results (Last 72 Hours)     ** No results found for the last 72 hours. **            Fungemia    Lactic acidosis    Polysubstance abuse (CMS/HCC)      Assessment:  Fungemia, with associated sepsis   Rule out endocarditis  Thrombocytopenia likely sepsis mediated versus cirrhosis  Polysubstance abuse  Hep C s/p treatment complicated by cirrhosis     Plan:     -given fluid bolus, cultures obtained last visit which grew out fungus sensitivities and specificity is pending  -Empiric IV micafungin with plan to de-escalate to p.o. fluconazole high-dose if susceptible   -Echocardiogram pending   -Ophthalmology follow-up outpatient   -continue PO Lasix, spironolactone, nadolol  -high risk, inpatient, full code    Avis Fabian DO  12/03/20  00:25 EST    Dictated utilizing Dragon dictation.      Electronically signed by Avis Fabian DO at 12/03/20 0121       Vital Signs (last day)     Date/Time   Temp   Temp src   Pulse   Resp   BP   Patient Position   SpO2    12/03/20 1100   97.5 (36.4)   Oral   61   18   106/46   Sitting   100    12/03/20 0700   97.6 (36.4)   Oral   63   18   109/62   Sitting   100    12/03/20 0412   98.1 (36.7)   Oral   63   18   110/44   Lying   98    12/02/20 2228   98.1 (36.7)   Oral   70   18   108/63   Lying   100                Current Facility-Administered Medications   Medication Dose Route Frequency Provider Last Rate Last Admin   • acetaminophen (TYLENOL) tablet 650 mg  650 mg Oral Q4H PRN Avis Fabian DO       • enoxaparin (LOVENOX) syringe 40 mg  40 mg Subcutaneous Q24H Avis Fabian DO   40 mg at 12/03/20 0034   • furosemide (LASIX) tablet 20 mg  20 mg Oral Daily [facility], DO   20 mg at 12/03/20 0857   • ketorolac (TORADOL) injection 15 mg  15 mg Intravenous Q6H PRN [facility], DO   15 mg at 12/03/20 1129   • micafungin 100 mg/100 mL 0.9% NS IVPB (mbp)  100 mg Intravenous Q24H [facility], DO   100 mg at 12/03/20 0021   • nicotine (NICODERM CQ) 21 MG/24HR patch 1 patch  1 patch Transdermal Q24H [facility], DO   1 patch at 12/03/20 0022   • ondansetron (ZOFRAN) injection 4 mg  4 mg Intravenous Q6H PRN [facility], DO       • ondansetron ODT (ZOFRAN-ODT) disintegrating tablet 4 mg  4 mg Oral Q6H PRN [facility], DO       • phosphorus (K PHOS NEUTRAL) tablet 2 tablet  500 mg Oral 4x Daily [facility],    2 tablet at 12/03/20 1129   • propranolol (INDERAL) tablet 20 mg  20 mg Oral BID [facility], DO   20 mg at 12/03/20 0857   • sodium chloride 0.9 % flush 10 mL  10 mL Intravenous Q12H [facility], DO       • sodium chloride 0.9 % flush 10 mL  10 mL Intravenous Q12H [facility], DO   10 mL at 12/03/20 0857   • sodium chloride 0.9 % flush 10 mL  10 mL Intravenous PRN [facility], DO       • spironolactone (ALDACTONE) tablet 50 mg  50 mg Oral Daily [facility], DO   50 mg at 12/03/20 0857   • traMADol (ULTRAM) tablet 50 mg  50 mg Oral Q6H PRN [facility], DO   50 mg at 12/03/20 0858       Lab Results (last 24 hours)     Procedure Component Value Units Date/Time    Procalcitonin [[id number]]  (Abnormal) Collected: 12/02/20 0039    Specimen: Blood Updated: 12/03/20 0105     Procalcitonin 22.93 ng/mL     Narrative:      As a Marker for Sepsis (Non-Neonates):   1. <0.5 ng/mL represents a low risk of severe sepsis and/or septic shock.  1. >2 ng/mL represents a high risk of severe sepsis and/or septic shock.    As a Marker for Lower Respiratory Tract Infections that require antibiotic therapy:  PCT on Admission     Antibiotic Therapy             6-12 Hrs later  > 0.5                Strongly Recommended            >0.25 - <0.5         Recommended  0.1 - 0.25            "Discouraged                   Remeasure/reassess PCT  <0.1                 Strongly Discouraged          Remeasure/reassess PCT      As 28 day mortality risk marker: \"Change in Procalcitonin Result\" (> 80 % or <=80 %) if Day 0 (or Day 1) and Day 4 values are available. Refer to http://www.[employer]-calculator.com/   Change in PCT <=80 %   A decrease of PCT levels below or equal to 80 % defines a positive change in PCT test result representing a higher risk for 28-day all-cause mortality of patients diagnosed with severe sepsis or septic shock.  Change in PCT > 80 %   A decrease of PCT levels of more than 80 % defines a negative change in PCT result representing a lower risk for 28-day all-cause mortality of patients diagnosed with severe sepsis or septic shock.                Results may be falsely decreased if patient taking Biotin.     Comprehensive Metabolic Panel [[id number]]  (Abnormal) Collected: 12/02/20 2328    Specimen: Blood Updated: 12/03/20 0026     Glucose 105 mg/dL      BUN 16 mg/dL      Creatinine 0.76 mg/dL      Sodium 141 mmol/L      Potassium 4.0 mmol/L      Chloride 107 mmol/L      CO2 27.9 mmol/L      Calcium 9.2 mg/dL      Total Protein 7.5 g/dL      Albumin 3.20 g/dL      ALT (SGPT) 168 U/L      AST (SGOT) 147 U/L      Alkaline Phosphatase 119 U/L      Total Bilirubin 0.8 mg/dL      eGFR Non African Amer 111 mL/min/1.73      Globulin 4.3 gm/dL      A/G Ratio 0.7 g/dL      BUN/Creatinine Ratio 21.1     Anion Gap 6.1 mmol/L     Narrative:      GFR Normal >60  Chronic Kidney Disease <60  Kidney Failure <15      CBC & Differential [[id number]]  (Abnormal) Collected: 12/02/20 2328    Specimen: Blood Updated: 12/03/20 0022    Narrative:      The following orders were created for panel order CBC & Differential.  Procedure                               Abnormality         Status                     ---------                               -----------         ------                     Scan " Slide[[id number]]                                       Final result               CBC Auto Differential[[id number]]        Abnormal            Final result                 Please view results for these tests on the individual orders.    Scan Slide [[id number]] Collected: 12/02/20 2328    Specimen: Blood Updated: 12/03/20 0022     RBC Morphology Normal     WBC Morphology Normal     Platelet Estimate Adequate    CBC Auto Differential [[id number]]  (Abnormal) Collected: 12/02/20 2328    Specimen: Blood Updated: 12/03/20 0022     WBC 7.89 10*3/mm3      RBC 4.38 10*6/mm3      Hemoglobin 13.1 g/dL      Hematocrit 40.0 %      MCV 91.3 fL      MCH 29.9 pg      MCHC 32.8 g/dL      RDW 14.3 %      RDW-SD 48.2 fl      MPV 11.4 fL      Platelets 121 10*3/mm3      Neutrophil % 64.4 %      Lymphocyte % 22.8 %      Monocyte % 9.5 %      Eosinophil % 2.5 %      Basophil % 0.4 %      Immature Grans % 0.4 %      Neutrophils, Absolute 5.08 10*3/mm3      Lymphocytes, Absolute 1.80 10*3/mm3      Monocytes, Absolute 0.75 10*3/mm3      Eosinophils, Absolute 0.20 10*3/mm3      Basophils, Absolute 0.03 10*3/mm3      Immature Grans, Absolute 0.03 10*3/mm3      nRBC 0.0 /100 WBC     Blood Culture - Blood, Arm, Left [[id number]] Collected: 12/02/20 2333    Specimen: Blood from Arm, Left Updated: 12/02/20 2337    Blood Culture - Blood, Arm, Left [[id number]] Collected: 12/02/20 2327    Specimen: Blood from Arm, Left Updated: 12/02/20 2337        Physician Progress Notes (last 24 hours) (Notes from 12/02/20 1257 through 12/03/20 1257)    No notes of this type exist for this encounter.         Consult Notes (last 24 hours) (Notes from 12/02/20 1257 through 12/03/20 1257)    No notes of this type exist for this encounter.

## 2020-12-03 NOTE — PLAN OF CARE
Goal Outcome Evaluation: Pt progressing, Receiving iv antifungals. Pt requesting PRN pain medication.

## 2020-12-03 NOTE — NURSING NOTE
At approximately 1330 Patient came to  and said he was leaving.  Jhoan MARY asked patient to leave and tried to call me.  I was attempting to start an IV on another patient.  He pulled his telemetry box off and got on elevator with his IV in place.  Dr. Wyman notified.

## 2020-12-04 LAB
BACTERIA BLD CULT: NORMAL
BACTERIA SPEC AEROBE CULT: ABNORMAL
GRAM STN SPEC: ABNORMAL
HOLD SPECIMEN: NORMAL
HOLD SPECIMEN: NORMAL
WHOLE BLOOD HOLD SPECIMEN: NORMAL
WHOLE BLOOD HOLD SPECIMEN: NORMAL

## 2020-12-04 PROCEDURE — 99223 1ST HOSP IP/OBS HIGH 75: CPT | Performed by: EMERGENCY MEDICINE

## 2020-12-04 PROCEDURE — 25010000002 ENOXAPARIN PER 10 MG: Performed by: EMERGENCY MEDICINE

## 2020-12-04 PROCEDURE — 25010000002 KETOROLAC TROMETHAMINE PER 15 MG: Performed by: EMERGENCY MEDICINE

## 2020-12-04 RX ORDER — SPIRONOLACTONE 25 MG/1
50 TABLET ORAL DAILY
Status: DISCONTINUED | OUTPATIENT
Start: 2020-12-04 | End: 2020-12-04

## 2020-12-04 RX ORDER — SODIUM CHLORIDE 0.9 % (FLUSH) 0.9 %
10 SYRINGE (ML) INJECTION AS NEEDED
Status: DISCONTINUED | OUTPATIENT
Start: 2020-12-04 | End: 2020-12-15 | Stop reason: HOSPADM

## 2020-12-04 RX ORDER — SODIUM CHLORIDE 0.9 % (FLUSH) 0.9 %
10 SYRINGE (ML) INJECTION EVERY 12 HOURS SCHEDULED
Status: DISCONTINUED | OUTPATIENT
Start: 2020-12-04 | End: 2020-12-04

## 2020-12-04 RX ORDER — ONDANSETRON 4 MG/1
4 TABLET, ORALLY DISINTEGRATING ORAL EVERY 6 HOURS PRN
Status: DISCONTINUED | OUTPATIENT
Start: 2020-12-04 | End: 2020-12-15 | Stop reason: HOSPADM

## 2020-12-04 RX ORDER — ONDANSETRON 2 MG/ML
4 INJECTION INTRAMUSCULAR; INTRAVENOUS EVERY 6 HOURS PRN
Status: DISCONTINUED | OUTPATIENT
Start: 2020-12-04 | End: 2020-12-15 | Stop reason: HOSPADM

## 2020-12-04 RX ORDER — PROPRANOLOL HYDROCHLORIDE 20 MG/1
20 TABLET ORAL 2 TIMES DAILY
Status: DISCONTINUED | OUTPATIENT
Start: 2020-12-04 | End: 2020-12-15 | Stop reason: HOSPADM

## 2020-12-04 RX ORDER — KETOROLAC TROMETHAMINE 30 MG/ML
15 INJECTION, SOLUTION INTRAMUSCULAR; INTRAVENOUS EVERY 6 HOURS PRN
Status: DISPENSED | OUTPATIENT
Start: 2020-12-04 | End: 2020-12-09

## 2020-12-04 RX ORDER — FUROSEMIDE 20 MG/1
20 TABLET ORAL DAILY
Status: DISCONTINUED | OUTPATIENT
Start: 2020-12-04 | End: 2020-12-13

## 2020-12-04 RX ORDER — HYDROCODONE BITARTRATE AND ACETAMINOPHEN 5; 325 MG/1; MG/1
1 TABLET ORAL EVERY 6 HOURS PRN
Status: DISPENSED | OUTPATIENT
Start: 2020-12-04 | End: 2020-12-11

## 2020-12-04 RX ORDER — ACETAMINOPHEN 325 MG/1
650 TABLET ORAL EVERY 4 HOURS PRN
Status: DISCONTINUED | OUTPATIENT
Start: 2020-12-04 | End: 2020-12-15 | Stop reason: HOSPADM

## 2020-12-04 RX ORDER — NICOTINE 21 MG/24HR
1 PATCH, TRANSDERMAL 24 HOURS TRANSDERMAL EVERY 24 HOURS
Status: DISCONTINUED | OUTPATIENT
Start: 2020-12-04 | End: 2020-12-15 | Stop reason: HOSPADM

## 2020-12-04 RX ORDER — PROPRANOLOL HYDROCHLORIDE 20 MG/1
20 TABLET ORAL 2 TIMES DAILY
Status: DISCONTINUED | OUTPATIENT
Start: 2020-12-04 | End: 2020-12-04

## 2020-12-04 RX ORDER — TRAMADOL HYDROCHLORIDE 50 MG/1
50 TABLET ORAL EVERY 6 HOURS PRN
Status: DISCONTINUED | OUTPATIENT
Start: 2020-12-04 | End: 2020-12-10

## 2020-12-04 RX ORDER — SPIRONOLACTONE 25 MG/1
50 TABLET ORAL DAILY
Status: DISCONTINUED | OUTPATIENT
Start: 2020-12-04 | End: 2020-12-13

## 2020-12-04 RX ORDER — FUROSEMIDE 20 MG/1
20 TABLET ORAL DAILY
Status: DISCONTINUED | OUTPATIENT
Start: 2020-12-04 | End: 2020-12-04

## 2020-12-04 RX ADMIN — SODIUM CHLORIDE, PRESERVATIVE FREE 10 ML: 5 INJECTION INTRAVENOUS at 03:27

## 2020-12-04 RX ADMIN — FUROSEMIDE 20 MG: 20 TABLET ORAL at 08:50

## 2020-12-04 RX ADMIN — MICAFUNGIN SODIUM 100 MG: 20 INJECTION, POWDER, LYOPHILIZED, FOR SOLUTION INTRAVENOUS at 03:27

## 2020-12-04 RX ADMIN — HYDROCODONE BITARTRATE AND ACETAMINOPHEN 1 TABLET: 5; 325 TABLET ORAL at 20:35

## 2020-12-04 RX ADMIN — KETOROLAC TROMETHAMINE 15 MG: 30 INJECTION, SOLUTION INTRAMUSCULAR at 12:06

## 2020-12-04 RX ADMIN — Medication 1 PATCH: at 03:27

## 2020-12-04 RX ADMIN — DIBASIC SODIUM PHOSPHATE, MONOBASIC POTASSIUM PHOSPHATE AND MONOBASIC SODIUM PHOSPHATE 2 TABLET: 852; 155; 130 TABLET ORAL at 08:50

## 2020-12-04 RX ADMIN — SODIUM CHLORIDE, PRESERVATIVE FREE 10 ML: 5 INJECTION INTRAVENOUS at 08:51

## 2020-12-04 RX ADMIN — DIBASIC SODIUM PHOSPHATE, MONOBASIC POTASSIUM PHOSPHATE AND MONOBASIC SODIUM PHOSPHATE 2 TABLET: 852; 155; 130 TABLET ORAL at 20:34

## 2020-12-04 RX ADMIN — SPIRONOLACTONE 50 MG: 25 TABLET, FILM COATED ORAL at 08:50

## 2020-12-04 RX ADMIN — DIBASIC SODIUM PHOSPHATE, MONOBASIC POTASSIUM PHOSPHATE AND MONOBASIC SODIUM PHOSPHATE 2 TABLET: 852; 155; 130 TABLET ORAL at 17:12

## 2020-12-04 RX ADMIN — PROPRANOLOL HYDROCHLORIDE 20 MG: 20 TABLET ORAL at 08:50

## 2020-12-04 RX ADMIN — DIBASIC SODIUM PHOSPHATE, MONOBASIC POTASSIUM PHOSPHATE AND MONOBASIC SODIUM PHOSPHATE 2 TABLET: 852; 155; 130 TABLET ORAL at 12:02

## 2020-12-04 RX ADMIN — TRAMADOL HYDROCHLORIDE 50 MG: 50 TABLET, FILM COATED ORAL at 17:12

## 2020-12-04 RX ADMIN — PROPRANOLOL HYDROCHLORIDE 20 MG: 20 TABLET ORAL at 20:35

## 2020-12-04 RX ADMIN — PROPRANOLOL HYDROCHLORIDE 20 MG: 20 TABLET ORAL at 03:27

## 2020-12-04 RX ADMIN — ENOXAPARIN SODIUM 40 MG: 40 INJECTION SUBCUTANEOUS at 03:26

## 2020-12-04 RX ADMIN — TRAMADOL HYDROCHLORIDE 50 MG: 50 TABLET, FILM COATED ORAL at 10:20

## 2020-12-04 NOTE — H&P
[facility]      Name:  Stephen Diallo   Age:  45 y.o.  Sex:  male  :  1975  MRN:  [id number]   Visit Number:  [id number]  Admission Date:  12/3/2020  Date Of Service:  20  Primary Care Physician:  Provider, No Known    Chief Complaint: \"I want to get better\"     History Of Presenting Illness:       45 M h/o IVDA, tobaccoism, hepatitis C status post treatment, incarceration who was brought to the ED after he was found sitting in the [employer] bathroom with needles beside him very lethargic.  He triggered sepsis criteria upon arrival in the ED 3 days ago, blood cultures took 48 hours to grow fungus. He was receiving IV rick. Patient eloped with an IV yday, and returned to the ED last night feeling regretful. He still has his IV in place. Susceptibilities and specificities are pending.  He denies visual symptoms or pain in any specific location.     Review Of Systems:     A full 10 point review of systems was performed and all pertinent positives and negatives are noted in the HPI.     Past Medical History:    Past Medical History:   Diagnosis Date   • Cancer (CMS/HCC)     skin       Past Surgical history:    History reviewed. No pertinent surgical history.    Social History:    Social History     Socioeconomic History   • Marital status: Single     Spouse name: Not on file   • Number of children: Not on file   • Years of education: Not on file   • Highest education level: Not on file   Tobacco Use   • Smoking status: Current Every Day Smoker     Packs/day: 1.00     Types: Cigarettes   • Smokeless tobacco: Former User   Substance and Sexual Activity   • Alcohol use: No     Frequency: Never   • Drug use: Yes     Types: IV     Comment: suboxone for opiods       Family History:    History reviewed. No pertinent family history.  Allergies:      Patient has no known allergies.    Home Medications:    Prior to Admission Medications     Prescriptions Last Dose " Informant Patient Reported? Taking?    acetaminophen (TYLENOL 8 HOUR) 650 MG 8 hr tablet   No Yes    Take 1 tablet by mouth Every 8 (Eight) Hours As Needed for Mild Pain .    albuterol sulfate  (90 Base) MCG/ACT inhaler   No Yes    Inhale 2 puffs Every 6 (Six) Hours As Needed for Wheezing or Shortness of Air.    furosemide (LASIX) 20 MG tablet   No Yes    Take 1 tablet by mouth Daily    propranolol (INDERAL) 20 MG tablet   No Yes    Take 1 tablet by mouth 2 (two) times a day.    spironolactone (ALDACTONE) 50 MG tablet   No Yes    Take 1 tablet by mouth Daily             ED Medications:    Medications - No data to display    Vital Signs:    Temp:  [97.5 °F (36.4 °C)-98.1 °F (36.7 °C)] 97.9 °F (36.6 °C)  Heart Rate:  [61-85] 81  Resp:  [18] 18  BP: (106-126)/(44-68) 126/63        12/03/20  2132 12/03/20  2355   Weight: 77.9 kg (171 lb 12.8 oz) 68.5 kg (151 lb)       Body mass index is 25.13 kg/m².    Physical Exam:  General: NAD  HEENT: EOMI, NC/AT  Heart: regular  Lungs: nonlabored  Abdomen: Soft, nondistended, nontender  Extremities: No edema  Neurological: A&O x3, moves all extremities  Psychological: good eye contact  Skin: warm, dry, tattoos    Laboratory data:    I have reviewed the labs done in the emergency room.    Results from last 7 days   Lab Units 12/03/20  2304 12/02/20  2328 12/02/20  0745  11/30/20  1641   SODIUM mmol/L 140 141 135*   < > 140   POTASSIUM mmol/L 3.8 4.0 4.0   < > 4.3   CHLORIDE mmol/L 106 107 108*   < > 107   CO2 mmol/L 25.1 27.9 22.5   < > 22.4   BUN mg/dL 16 16 19   < > 17   CREATININE mg/dL 0.74* 0.76 0.63*   < > 0.78   CALCIUM mg/dL 8.9 9.2 8.7   < > 8.8   BILIRUBIN mg/dL 0.8 0.8  --   --  1.4*   ALK PHOS U/L 123* 119*  --   --  157*   ALT (SGPT) U/L 160* 168*  --   --  197*   AST (SGOT) U/L 158* 147*  --   --  184*   GLUCOSE mg/dL 99 105* 109*   < > 120*    < > = values in this interval not displayed.     Results from last 7 days   Lab Units 12/03/20  2089 12/02/20  7134  12/02/20  0745   WBC 10*3/mm3 6.53 7.89 9.14   HEMOGLOBIN g/dL 12.1* 13.1 12.4*   HEMATOCRIT % 37.0* 40.0 38.5   PLATELETS 10*3/mm3 133* 121* 85*     Results from last 7 days   Lab Units 11/30/20  1641   INR  1.26*     Results from last 7 days   Lab Units 11/30/20  1641   TROPONIN T ng/mL <0.010     Results from last 7 days   Lab Units 11/30/20  1641   PROBNP pg/mL 104.1         Results from last 7 days   Lab Units 11/30/20  1641   LIPASE U/L 21         Results from last 7 days   Lab Units 11/30/20  1832   COLOR UA  Yellow   GLUCOSE UA  Negative   KETONES UA  Negative   LEUKOCYTES UA  Negative   PH, URINE  5.5   BILIRUBIN UA  Negative   UROBILINOGEN UA  1.0 E.U./dL     Pain Management Panel     Pain Management Panel Latest Ref Rng & Units 12/3/2020 11/30/2020    AMPHETAMINES SCREEN, URINE Negative Positive(A) Positive(A)    BARBITURATES SCREEN Negative Negative Negative    BENZODIAZEPINE SCREEN, URINE Negative Negative Negative    BUPRENORPHINEUR Negative Negative Negative    COCAINE SCREEN, URINE Negative Negative Negative    METHADONE SCREEN, URINE Negative Negative Negative    METHAMPHETAMINEUR Negative Positive(A) Positive(A)        Results from last 7 days   Lab Units 12/02/20  2333 12/02/20  2327 11/30/20  1655 11/30/20  1650   BLOODCX  No growth at 24 hours No growth at 24 hours Fungus (Organism type)* Abnormal Stain*     Radiology:    Imaging Results (Last 72 Hours)     ** No results found for the last 72 hours. **            Fungemia    Lactic acidosis    Polysubstance abuse (CMS/HCC)       Assessment:  Fungemia, with associated sepsis   Rule out endocarditis  Thrombocytopenia likely sepsis mediated versus cirrhosis  Polysubstance abuse  Hep C s/p treatment complicated by cirrhosis     Plan:   -continue empiric IV micafungin with plan to de-escalate to p.o. fluconazole high-dose if susceptible; PCT trending down  -Echocardiogram without veg, will consider cardio consult and EMILY   -Ophthalmology follow-up  outpatient   -continue PO Lasix, spironolactone, nadolol  -high risk, inpatient, full code    Avis Fabian DO  12/04/20  01:46 EST    Dictated utilizing Dragon dictation.

## 2020-12-04 NOTE — PLAN OF CARE
Goal Outcome Evaluation:  Plan of Care Reviewed With: patient  Progress: no change  Outcome Summary: Patient came to floor from ED pleasant and cooperative.  Patient appeared to sleep well and had no complaints of pain through this shift.  Patient tolerated IV medication well.  VSS.  Continue to monitor and provide care as appropriate and ordered.

## 2020-12-04 NOTE — PROGRESS NOTES
Continued Stay Note  NICOLASA Jaquez     Patient Name: Stephen Diallo  MRN: [id number]  Today's Date: 12/4/2020    Admit Date: 12/3/2020    Discharge Plan     Row Name 12/04/20 0837       Plan    Plan PT left yesterday with his IV an came back to ER feeling regretful and wanting treatment.  He was seen by Case Management yesterday before he left and was given resources for shelters. by JESSICA Bhatia.  SW was given the consult and will also follow for DC needs.        Discharge Codes    No documentation.             Mary Hillman RN

## 2020-12-04 NOTE — ED NOTES
Dr. Fabian called per Dr. Sanabria without answer. Voicemail left at this time.      Derrick Lock  12/03/20 3707

## 2020-12-04 NOTE — ED PROVIDER NOTES
TRIAGE CHIEF COMPLAINT:     Nursing and triage notes reviewed    Chief Complaint   Patient presents with   • Illness      HPI: Stephen Diallo is a 45 y.o. male who presents to the emergency department complaining of fungemia.  Patient had been admitted to this facility as early as earlier today due to positive blood cultures for fungal infection.  Patient however had eloped from the hospital earlier today.  Patient states he talk to his family and they were very angry with him and he decided to come back and wanted to continue treatment at this time.  Patient does have a history of IV drug abuse.  He had been admitted for fungal EMEA and to rule out endocarditis.  He is not currently have any fevers or chills.    REVIEW OF SYSTEMS: All other systems reviewed and are negative     PAST MEDICAL HISTORY:   Past Medical History:   Diagnosis Date   • Cancer (CMS/HCC)     skin        FAMILY HISTORY:   History reviewed. No pertinent family history.     SOCIAL HISTORY:   Social History     Socioeconomic History   • Marital status: Single     Spouse name: Not on file   • Number of children: Not on file   • Years of education: Not on file   • Highest education level: Not on file   Tobacco Use   • Smoking status: Current Every Day Smoker     Packs/day: 1.00     Types: Cigarettes   • Smokeless tobacco: Former User   Substance and Sexual Activity   • Alcohol use: No     Frequency: Never   • Drug use: Yes     Types: IV     Comment: suboxone for opiods        SURGICAL HISTORY:   History reviewed. No pertinent surgical history.     CURRENT MEDICATIONS:      Medication List      ASK your doctor about these medications    acetaminophen 650 MG 8 hr tablet  Commonly known as: Tylenol 8 Hour  Take 1 tablet by mouth Every 8 (Eight) Hours As Needed for Mild Pain .     albuterol sulfate  (90 Base) MCG/ACT inhaler  Commonly known as: PROVENTIL HFA;VENTOLIN HFA;PROAIR HFA  Inhale 2 puffs Every 6 (Six) Hours As Needed for Wheezing or  Shortness of Air.     furosemide 20 MG tablet  Commonly known as: LASIX  Take 1 tablet by mouth Daily     propranolol 20 MG tablet  Commonly known as: INDERAL  Take 1 tablet by mouth 2 (two) times a day.     spironolactone 50 MG tablet  Commonly known as: ALDACTONE  Take 1 tablet by mouth Daily             ALLERGIES: Patient has no known allergies.     PHYSICAL EXAM:   VITAL SIGNS:   Vitals:    12/03/20 2132   BP: 115/68   Pulse: 85   Resp: 18   Temp: 98 °F (36.7 °C)   SpO2: 97%      CONSTITUTIONAL: Awake, oriented, appears non-toxic   HENT: Atraumatic, normocephalic, oral mucosa pink and moist, airway patent. Nares patent without drainage. External ears normal.   EYES: Conjunctiva clear   NECK: Trachea midline, non-tender, supple   CARDIOVASCULAR: Normal heart rate, Normal rhythm, No murmurs, rubs, gallops   PULMONARY/CHEST: Clear to auscultation, no rhonchi, wheezes, or rales. Symmetrical breath sounds.   ABDOMINAL: Non-distended, soft, non-tender - no rebound or guarding.  NEUROLOGIC: Non-focal, moving all four extremities, no gross sensory or motor deficits.   EXTREMITIES: No clubbing, cyanosis, or edema   SKIN: Warm, Dry, No erythema, No rash     ED COURSE / MEDICAL DECISION MAKING:   Stephen Diallo is a 45 y.o. male who presents to the emergency department for evaluation of fungal EMEA.  Patient had left the hospital earlier today prior to discharge.  Patient states he was scared and panicked and so left.  He states he does want to continue therapy and states that he will stay in the hospital as long as necessary on this occasion.  I contacted the hospitalist on arrival given patient's vital signs were stable.  He agreed to admit the patient for further treatment.  I did add basic labs and further procalcitonin lactic acid.  I did speak with lab while patient was in the emergency department one of his previous blood cultures also came back again positive for budding yeast.    DECISION TO DISCHARGE/ADMIT: see  ED care timeline     FINAL IMPRESSION:   1 --fungemia  2 --   3 --     Electronically signed by: Hortencia Sanabria MD, 12/3/2020 22:34 Hortencia Lopez MD  12/03/20 2230

## 2020-12-04 NOTE — PROGRESS NOTES
Case Management Discharge Note           Provided Post Acute Provider List?: Refused  Provided Post Acute Provider Quality & Resource List?: Refused    Selected Continued Care - Discharged on 12/3/2020 Admission date: 12/2/2020 - Discharge disposition: Left Against Medical Advice    Destination    No services have been selected for the patient.              Durable Medical Equipment    No services have been selected for the patient.              Dialysis/Infusion    No services have been selected for the patient.              Home Medical Care    No services have been selected for the patient.              Therapy    No services have been selected for the patient.              Community Resources    No services have been selected for the patient.                       Final Discharge Disposition Code: 07 - left AMA

## 2020-12-05 PROCEDURE — 99232 SBSQ HOSP IP/OBS MODERATE 35: CPT | Performed by: INTERNAL MEDICINE

## 2020-12-05 PROCEDURE — 25010000002 ENOXAPARIN PER 10 MG: Performed by: EMERGENCY MEDICINE

## 2020-12-05 PROCEDURE — 25010000002 KETOROLAC TROMETHAMINE PER 15 MG: Performed by: EMERGENCY MEDICINE

## 2020-12-05 PROCEDURE — 63710000001 ONDANSETRON ODT 4 MG TABLET DISPERSIBLE: Performed by: EMERGENCY MEDICINE

## 2020-12-05 RX ADMIN — KETOROLAC TROMETHAMINE 15 MG: 30 INJECTION, SOLUTION INTRAMUSCULAR at 17:15

## 2020-12-05 RX ADMIN — ONDANSETRON 4 MG: 4 TABLET, ORALLY DISINTEGRATING ORAL at 14:12

## 2020-12-05 RX ADMIN — HYDROCODONE BITARTRATE AND ACETAMINOPHEN 1 TABLET: 5; 325 TABLET ORAL at 10:04

## 2020-12-05 RX ADMIN — SPIRONOLACTONE 50 MG: 25 TABLET, FILM COATED ORAL at 09:12

## 2020-12-05 RX ADMIN — PROPRANOLOL HYDROCHLORIDE 20 MG: 20 TABLET ORAL at 09:11

## 2020-12-05 RX ADMIN — HYDROCODONE BITARTRATE AND ACETAMINOPHEN 1 TABLET: 5; 325 TABLET ORAL at 23:24

## 2020-12-05 RX ADMIN — SODIUM CHLORIDE, PRESERVATIVE FREE 10 ML: 5 INJECTION INTRAVENOUS at 09:12

## 2020-12-05 RX ADMIN — HYDROCODONE BITARTRATE AND ACETAMINOPHEN 1 TABLET: 5; 325 TABLET ORAL at 17:15

## 2020-12-05 RX ADMIN — TRAMADOL HYDROCHLORIDE 50 MG: 50 TABLET, FILM COATED ORAL at 14:12

## 2020-12-05 RX ADMIN — MICAFUNGIN SODIUM 100 MG: 20 INJECTION, POWDER, LYOPHILIZED, FOR SOLUTION INTRAVENOUS at 03:00

## 2020-12-05 RX ADMIN — PROPRANOLOL HYDROCHLORIDE 20 MG: 20 TABLET ORAL at 21:16

## 2020-12-05 RX ADMIN — DIBASIC SODIUM PHOSPHATE, MONOBASIC POTASSIUM PHOSPHATE AND MONOBASIC SODIUM PHOSPHATE 2 TABLET: 852; 155; 130 TABLET ORAL at 09:00

## 2020-12-05 RX ADMIN — KETOROLAC TROMETHAMINE 15 MG: 30 INJECTION, SOLUTION INTRAMUSCULAR at 09:11

## 2020-12-05 RX ADMIN — ENOXAPARIN SODIUM 40 MG: 40 INJECTION SUBCUTANEOUS at 03:00

## 2020-12-05 RX ADMIN — FUROSEMIDE 20 MG: 20 TABLET ORAL at 09:12

## 2020-12-05 RX ADMIN — HYDROCODONE BITARTRATE AND ACETAMINOPHEN 1 TABLET: 5; 325 TABLET ORAL at 03:00

## 2020-12-05 RX ADMIN — Medication 1 PATCH: at 02:59

## 2020-12-05 NOTE — PLAN OF CARE
Goal Outcome Evaluation:  Plan of Care Reviewed With: patient  Progress: improving  Outcome Summary: VSS.  Pt c/o pain that was controlled with PRN meds.  No acute changes to report.  Will conitnue to monitor.

## 2020-12-05 NOTE — PLAN OF CARE
Goal Outcome Evaluation:  Plan of Care Reviewed With: patient  Progress: improving   Stephen VSS. Pain controlled per MAR. He is  compliant with plan of care. Dr. Lerma has been contacted for consult for possible EMILY, advice and management of condition.

## 2020-12-05 NOTE — PROGRESS NOTES
[facility]    Name:  Stephen Diallo   Age:  45 y.o.  Sex:  male  :  1975  MRN:  [id number]   Visit Number:  [id number]  Admission Date:  12/3/2020  Date Of Service:  20  Primary Care Physician:  Provider, No Known     LOS: 2 days :  Patient Care Team:  Provider, No Known as PCP - General  Provider, No Known as PCP - Family Medicine:      Subjective / Interval History:     45 M h/o IVDA, hepatitis C status post treatment, incarceration who has been having fungemia.  Patient was admitted and had signed out AMA has returned back.  Chart and events reviewed from the prior admit.    At present patient is on IV micafungin and is feeling okay.  He denies any fever.  No signs of withdrawal from his drug abuse patient does not have fever.      Vital Signs:    Temp:  [97.4 °F (36.3 °C)-98.6 °F (37 °C)] 97.7 °F (36.5 °C)  Heart Rate:  [52-76] 67  Resp:  [18] 18  BP: (104-122)/(56-74) 117/56    Intake and output:    I/O last 3 completed shifts:  In: 1677 [P.O.:1677]  Out: 4300 [Urine:3800; Stool:500]  I/O this shift:  In: 720 [P.O.:720]  Out: 375 [Urine:375]    Physical Examination:    General Appearance:    Alert and cooperative, not in any acute distress.   Head:    Atraumatic and normocephalic, without obvious abnormality.   Eyes:            PERRLA,  No pallor. Extraocular movements are within normal limits.   Neck:   Supple,  No lymph glands, no bruit   Lungs:     Chest shape is normal. Breath sounds heard bilaterally equally.  No crackles or wheezing.     Heart:    Normal S1 and S2, no murmur,  No JVD   Abdomen:     Normal bowel sounds, no masses, no organomegaly. Soft     nontender, no guarding, no rebound tenderness   Extremities:   Moves all extremities well, no edema, no cyanosis,    Skin:   No  bruising or rash.   Neurologic:   Grossly nonfocal and moves all extremities.      Laboratory results:  Results from last 7 days   Lab Units 20  5707  12/02/20  2328 12/02/20  0745  11/30/20  1641   SODIUM mmol/L 140 141 135*   < > 140   POTASSIUM mmol/L 3.8 4.0 4.0   < > 4.3   CHLORIDE mmol/L 106 107 108*   < > 107   CO2 mmol/L 25.1 27.9 22.5   < > 22.4   BUN mg/dL 16 16 19   < > 17   CREATININE mg/dL 0.74* 0.76 0.63*   < > 0.78   CALCIUM mg/dL 8.9 9.2 8.7   < > 8.8   BILIRUBIN mg/dL 0.8 0.8  --   --  1.4*   ALK PHOS U/L 123* 119*  --   --  157*   ALT (SGPT) U/L 160* 168*  --   --  197*   AST (SGOT) U/L 158* 147*  --   --  184*   GLUCOSE mg/dL 99 105* 109*   < > 120*    < > = values in this interval not displayed.     Results from last 7 days   Lab Units 12/03/20  2304 12/02/20 2328 12/02/20  0745   WBC 10*3/mm3 6.53 7.89 9.14   HEMOGLOBIN g/dL 12.1* 13.1 12.4*   HEMATOCRIT % 37.0* 40.0 38.5   PLATELETS 10*3/mm3 133* 121* 85*     Results from last 7 days   Lab Units 11/30/20  1641   INR  1.26*     Results from last 7 days   Lab Units 11/30/20  1641   TROPONIN T ng/mL <0.010     Results from last 7 days   Lab Units 12/02/20  2333 12/02/20  2327 11/30/20  1655 11/30/20  1650   BLOODCX  No growth at 2 days No growth at 2 days Mucor species* Yeast isolated*       Radiology results:    Imaging Results (Last 24 Hours)     ** No results found for the last 24 hours. **          I have reviewed the patient's radiology reports.    Medication Review:     I have reviewed the patients active and prn medications.     Assessment:      Fungemia    Lactic acidosis    Polysubstance abuse (CMS/HCC)      Plan:    Fungemia-patient at present is being treated for IV antifungals micafungin this was on the blood culture which was done on the 30th.  On the second December repeat cultures have been ordered and results are pending    Continue supportive care and rest of the medications as per orders    Ravin Wong MD  12/05/20  13:05 EST      Please note that portions of this note were completed with a voice recognition program.

## 2020-12-06 LAB
ALBUMIN SERPL-MCNC: 2.8 G/DL (ref 3.5–5.2)
ALBUMIN/GLOB SERPL: 0.7 G/DL
ALP SERPL-CCNC: 127 U/L (ref 39–117)
ALT SERPL W P-5'-P-CCNC: 134 U/L (ref 1–41)
ANION GAP SERPL CALCULATED.3IONS-SCNC: 6.5 MMOL/L (ref 5–15)
AST SERPL-CCNC: 132 U/L (ref 1–40)
BILIRUB SERPL-MCNC: 0.7 MG/DL (ref 0–1.2)
BUN SERPL-MCNC: 19 MG/DL (ref 6–20)
BUN/CREAT SERPL: 24.1 (ref 7–25)
CALCIUM SPEC-SCNC: 9 MG/DL (ref 8.6–10.5)
CHLORIDE SERPL-SCNC: 107 MMOL/L (ref 98–107)
CO2 SERPL-SCNC: 25.5 MMOL/L (ref 22–29)
CREAT SERPL-MCNC: 0.79 MG/DL (ref 0.76–1.27)
DEPRECATED RDW RBC AUTO: 44.9 FL (ref 37–54)
ERYTHROCYTE [DISTWIDTH] IN BLOOD BY AUTOMATED COUNT: 13.9 % (ref 12.3–15.4)
GFR SERPL CREATININE-BSD FRML MDRD: 107 ML/MIN/1.73
GLOBULIN UR ELPH-MCNC: 4.1 GM/DL
GLUCOSE SERPL-MCNC: 92 MG/DL (ref 65–99)
HCT VFR BLD AUTO: 38.6 % (ref 37.5–51)
HGB BLD-MCNC: 13 G/DL (ref 13–17.7)
MCH RBC QN AUTO: 30.1 PG (ref 26.6–33)
MCHC RBC AUTO-ENTMCNC: 33.7 G/DL (ref 31.5–35.7)
MCV RBC AUTO: 89.4 FL (ref 79–97)
PLATELET # BLD AUTO: 131 10*3/MM3 (ref 140–450)
PMV BLD AUTO: 11.7 FL (ref 6–12)
POTASSIUM SERPL-SCNC: 4.3 MMOL/L (ref 3.5–5.2)
PROT SERPL-MCNC: 6.9 G/DL (ref 6–8.5)
RBC # BLD AUTO: 4.32 10*6/MM3 (ref 4.14–5.8)
SODIUM SERPL-SCNC: 139 MMOL/L (ref 136–145)
WBC # BLD AUTO: 6.32 10*3/MM3 (ref 3.4–10.8)

## 2020-12-06 PROCEDURE — 25010000002 KETOROLAC TROMETHAMINE PER 15 MG: Performed by: EMERGENCY MEDICINE

## 2020-12-06 PROCEDURE — 80053 COMPREHEN METABOLIC PANEL: CPT | Performed by: INTERNAL MEDICINE

## 2020-12-06 PROCEDURE — 85027 COMPLETE CBC AUTOMATED: CPT | Performed by: INTERNAL MEDICINE

## 2020-12-06 PROCEDURE — 99231 SBSQ HOSP IP/OBS SF/LOW 25: CPT | Performed by: INTERNAL MEDICINE

## 2020-12-06 PROCEDURE — 25010000002 ENOXAPARIN PER 10 MG: Performed by: EMERGENCY MEDICINE

## 2020-12-06 PROCEDURE — 99254 IP/OBS CNSLTJ NEW/EST MOD 60: CPT | Performed by: INTERNAL MEDICINE

## 2020-12-06 RX ORDER — HYDROXYZINE PAMOATE 25 MG/1
25 CAPSULE ORAL ONCE
Status: COMPLETED | OUTPATIENT
Start: 2020-12-06 | End: 2020-12-06

## 2020-12-06 RX ADMIN — MICAFUNGIN SODIUM 100 MG: 20 INJECTION, POWDER, LYOPHILIZED, FOR SOLUTION INTRAVENOUS at 03:35

## 2020-12-06 RX ADMIN — TRAMADOL HYDROCHLORIDE 50 MG: 50 TABLET, FILM COATED ORAL at 20:15

## 2020-12-06 RX ADMIN — KETOROLAC TROMETHAMINE 15 MG: 30 INJECTION, SOLUTION INTRAMUSCULAR at 21:05

## 2020-12-06 RX ADMIN — Medication 1 PATCH: at 03:36

## 2020-12-06 RX ADMIN — SODIUM CHLORIDE, PRESERVATIVE FREE 10 ML: 5 INJECTION INTRAVENOUS at 11:22

## 2020-12-06 RX ADMIN — HYDROCODONE BITARTRATE AND ACETAMINOPHEN 1 TABLET: 5; 325 TABLET ORAL at 05:30

## 2020-12-06 RX ADMIN — PROPRANOLOL HYDROCHLORIDE 20 MG: 20 TABLET ORAL at 21:04

## 2020-12-06 RX ADMIN — KETOROLAC TROMETHAMINE 15 MG: 30 INJECTION, SOLUTION INTRAMUSCULAR at 03:34

## 2020-12-06 RX ADMIN — HYDROCODONE BITARTRATE AND ACETAMINOPHEN 1 TABLET: 5; 325 TABLET ORAL at 11:22

## 2020-12-06 RX ADMIN — SPIRONOLACTONE 50 MG: 25 TABLET, FILM COATED ORAL at 11:28

## 2020-12-06 RX ADMIN — ENOXAPARIN SODIUM 40 MG: 40 INJECTION SUBCUTANEOUS at 03:34

## 2020-12-06 RX ADMIN — HYDROCODONE BITARTRATE AND ACETAMINOPHEN 1 TABLET: 5; 325 TABLET ORAL at 18:56

## 2020-12-06 RX ADMIN — TRAMADOL HYDROCHLORIDE 50 MG: 50 TABLET, FILM COATED ORAL at 03:34

## 2020-12-06 RX ADMIN — PROPRANOLOL HYDROCHLORIDE 20 MG: 20 TABLET ORAL at 11:29

## 2020-12-06 RX ADMIN — FUROSEMIDE 20 MG: 20 TABLET ORAL at 11:22

## 2020-12-06 RX ADMIN — HYDROXYZINE PAMOATE 25 MG: 25 CAPSULE ORAL at 21:04

## 2020-12-06 NOTE — PROGRESS NOTES
[facility]    Name:  Stephen Diallo   Age:  45 y.o.  Sex:  male  :  1975  MRN:  [id number]   Visit Number:  [id number]  Admission Date:  12/3/2020  Date Of Service:  20  Primary Care Physician:  Provider, No Known     LOS: 3 days :  Patient Care Team:  Provider, No Known as PCP - General  Provider, No Known as PCP - Family Medicine:      Subjective / Interval History:     45 M h/o IVDA, hepatitis C status post treatment, incarceration who has been having fungemia.  Patient was admitted and had signed out AMA has returned back.  Chart and events reviewed from the prior admit.  Patient has been started on micafungin since this admission again    Today on  patient has been seen and evaluated.  He still feels weak.  Denies any chest pain palpitations.  There has been no fever.  Patient has been tolerating the IV antibiotic.      Vital Signs:    Temp:  [97.8 °F (36.6 °C)-98.3 °F (36.8 °C)] 98 °F (36.7 °C)  Heart Rate:  [58-73] 73  Resp:  [18] 18  BP: (100-128)/(44-73) 128/68    Intake and output:    I/O last 3 completed shifts:  In: 3097 [P.O.:3097]  Out: 3250 [Urine:3250]  I/O this shift:  In: 320 [P.O.:320]  Out: 400 [Urine:400]    Physical Examination:    General Appearance:    Alert and cooperative, not in any acute distress.   Head:    Atraumatic and normocephalic, without obvious abnormality.   Eyes:            PERRLA,  No pallor. Extraocular movements are within normal limits.   Neck:   Supple,  No lymph glands, no bruit   Lungs:     Chest shape is normal. Breath sounds heard bilaterally equally.  No crackles or wheezing.     Heart:    Normal S1 and S2, no murmur,  No JVD   Abdomen:     Normal bowel sounds, no masses, no organomegaly. Soft     nontender, no guarding, no rebound tenderness   Extremities:   Moves all extremities well, no edema, no cyanosis,    Skin:   No  bruising or rash.   Neurologic:   Grossly nonfocal and moves all  extremities.      Laboratory results:  Results from last 7 days   Lab Units 12/06/20  0526 12/03/20  2304 12/02/20  2328   SODIUM mmol/L 139 140 141   POTASSIUM mmol/L 4.3 3.8 4.0   CHLORIDE mmol/L 107 106 107   CO2 mmol/L 25.5 25.1 27.9   BUN mg/dL 19 16 16   CREATININE mg/dL 0.79 0.74* 0.76   CALCIUM mg/dL 9.0 8.9 9.2   BILIRUBIN mg/dL 0.7 0.8 0.8   ALK PHOS U/L 127* 123* 119*   ALT (SGPT) U/L 134* 160* 168*   AST (SGOT) U/L 132* 158* 147*   GLUCOSE mg/dL 92 99 105*     Results from last 7 days   Lab Units 12/06/20  0526 12/03/20 2304 12/02/20 2328   WBC 10*3/mm3 6.32 6.53 7.89   HEMOGLOBIN g/dL 13.0 12.1* 13.1   HEMATOCRIT % 38.6 37.0* 40.0   PLATELETS 10*3/mm3 131* 133* 121*     Results from last 7 days   Lab Units 11/30/20  1641   INR  1.26*     Results from last 7 days   Lab Units 11/30/20  1641   TROPONIN T ng/mL <0.010     Results from last 7 days   Lab Units 12/02/20  2333 12/02/20  2327 11/30/20  1655 11/30/20  1650   BLOODCX  No growth at 3 days No growth at 3 days Mucor species* Yeast isolated*       Radiology results:    Imaging Results (Last 24 Hours)     ** No results found for the last 24 hours. **          I have reviewed the patient's radiology reports.    Medication Review:     I have reviewed the patients active and prn medications.     Assessment:      Fungemia    Lactic acidosis    Polysubstance abuse (CMS/HCC)      Plan:    Fungemia -patient at present is being treated for IV antifungals micafungin this was on the blood culture which was done on the 30th.  On the second December repeat cultures have been ordered and results are pending plan will be to give at least 4 to 6 weeks of IV antibiotic.    Patient will get EMILY done and there Dr. Lerma has been consulted.  He will be n.p.o. from midnight and do it tomorrow    Continue supportive care and rest of the medications as per orders    Ravin Wong MD  12/06/20  13:26 EST      Please note that portions of this note were completed with a  voice recognition program.

## 2020-12-06 NOTE — PLAN OF CARE
Goal Outcome Evaluation:  Plan of Care Reviewed With: patient  Progress: improving  Outcome Summary: VSS. Pt c/o pain that was controlled with PRN meds.  No acute changes in pt condition to report.  Will continue to monitor.

## 2020-12-06 NOTE — CONSULTS
[facility] Cardiology Consult Note    Stephen Diallo  1975  [id number]  12/06/20    Requesting Physician: Ravin Wong MD    Chief Complaint: Fungemia    History of Present Illness:   Mr. Stephen Diallo is a 45 y.o. male who is being seen by Cardiology for consideration of transesophageal echocardiogram in the setting of fungemia.  The patient has a past medical history significant for IV drug abuse, hepatitis C, and chronic tobacco use.  He also has a history of prior incarceration.  He presented to the emergency department on 12/3/2020 after he was found sitting in a [employer] bathroom lethargic.  He was noted to have multiple needles next to him at that time.  On evaluation in the emergency department, he met sepsis criteria.  UDS was positive for methamphetamines as well as opiates.  Blood cultures on presentation currently growing Mucor species.  He has been placed on micafungin.  A transthoracic echocardiogram was completed on 12/3/2020 with normal left ventricular systolic function and no significant valvular abnormalities.  Given concern for valvular endocarditis, cardiology has been consulted for consideration of EMILY.      Review of Systems:   Review of Systems   Constitutional: Negative for activity change, appetite change, chills, diaphoresis, fatigue, fever, unexpected weight gain and unexpected weight loss.   Eyes: Negative for blurred vision and double vision.   Respiratory: Negative for cough, chest tightness, shortness of breath and wheezing.    Cardiovascular: Negative for chest pain, palpitations and leg swelling.   Gastrointestinal: Negative for abdominal pain, anal bleeding, blood in stool and GERD.   Endocrine: Negative for cold intolerance and heat intolerance.   Genitourinary: Negative for hematuria.   Neurological: Negative for dizziness, syncope, weakness and light-headedness.   Hematological: Does not bruise/bleed easily.   Psychiatric/Behavioral: Negative for depressed  mood and stress. The patient is not nervous/anxious.        Past Medical History:   Past Medical History:   Diagnosis Date   • Cancer (CMS/HCC)     skin       Past Surgical History: History reviewed. No pertinent surgical history.    Family History: History reviewed. No pertinent family history.    Social History:   Social History     Socioeconomic History   • Marital status: Single     Spouse name: Not on file   • Number of children: Not on file   • Years of education: Not on file   • Highest education level: Not on file   Tobacco Use   • Smoking status: Current Every Day Smoker     Packs/day: 1.00     Types: Cigarettes   • Smokeless tobacco: Former User   Substance and Sexual Activity   • Alcohol use: No     Frequency: Never   • Drug use: Yes     Types: IV     Comment: suboxone for opiods       Medications:     Current Facility-Administered Medications:   •  acetaminophen (TYLENOL) tablet 650 mg, 650 mg, Oral, Q4H PRN, Caren, Basiaar, DO  •  enoxaparin (LOVENOX) syringe 40 mg, 40 mg, Subcutaneous, Q24H, Caren, Basiaar, DO, 40 mg at 12/06/20 0334  •  furosemide (LASIX) tablet 20 mg, 20 mg, Oral, Daily, Caren, Basiaar, DO, 20 mg at 12/05/20 0912  •  HYDROcodone-acetaminophen (NORCO) 5-325 MG per tablet 1 tablet, 1 tablet, Oral, Q6H PRN, Caren, Basiaar, DO, 1 tablet at 12/06/20 0530  •  ketorolac (TORADOL) injection 15 mg, 15 mg, Intravenous, Q6H PRN, Caren, Basiaar, DO, 15 mg at 12/06/20 0334  •  micafungin 100 mg/100 mL 0.9% NS IVPB (mbp), 100 mg, Intravenous, Q24H, Caren, Umar, DO, 100 mg at 12/06/20 0335  •  nicotine (NICODERM CQ) 21 MG/24HR patch 1 patch, 1 patch, Transdermal, Q24H, Caren, Basiaar, DO, 1 patch at 12/06/20 0336  •  ondansetron (ZOFRAN) injection 4 mg, 4 mg, Intravenous, Q6H PRN, Caren, Basiaar, DO  •  ondansetron ODT (ZOFRAN-ODT) disintegrating tablet 4 mg, 4 mg, Oral, Q6H PRN, Avis Fabian DO, 4 mg at 12/05/20 1412  •  propranolol (INDERAL) tablet 20 mg, 20 mg, Oral, BID, Avis Fabian DO, 20 mg at 12/05/20  "2116  •  sodium chloride 0.9 % flush 10 mL, 10 mL, Intravenous, PRN, Caren, Umar, DO, 10 mL at 12/05/20 0912  •  spironolactone (ALDACTONE) tablet 50 mg, 50 mg, Oral, Daily, Caren, Umar, DO, 50 mg at 12/05/20 0912  •  traMADol (ULTRAM) tablet 50 mg, 50 mg, Oral, Q6H PRN, Caren, Umar, DO, 50 mg at 12/06/20 0334    Allergies:   No Known Allergies    Physical Exam:  Vital Signs:   Vitals:    12/05/20 2000 12/06/20 0100 12/06/20 0400 12/06/20 0800   BP: 119/73 100/44 117/64 106/70   BP Location: Left arm Left arm Left arm Left arm   Patient Position: Sitting Lying Lying Lying   Pulse: 70 60 58 62   Resp: 18 18 18 18   Temp: 97.8 °F (36.6 °C) 98 °F (36.7 °C) 98.3 °F (36.8 °C) 98 °F (36.7 °C)   TempSrc: Oral Oral Oral Oral   SpO2: 100% 99% 100% 100%   Weight:  68.6 kg (151 lb 3.8 oz) 69.4 kg (153 lb)    Height:  165.1 cm (65\") 165.1 cm (65\")        Physical Exam  Vitals signs and nursing note reviewed.   Constitutional:       General: He is not in acute distress.     Appearance: Normal appearance. He is well-developed. He is not diaphoretic.   HENT:      Head: Normocephalic and atraumatic.   Eyes:      General: No scleral icterus.     Pupils: Pupils are equal, round, and reactive to light.   Neck:      Musculoskeletal: Neck supple. No muscular tenderness.      Trachea: No tracheal deviation.   Cardiovascular:      Rate and Rhythm: Normal rate and regular rhythm.      Heart sounds: Normal heart sounds. No murmur. No friction rub. No gallop.       Comments: Normal JVD.  Pulmonary:      Effort: Pulmonary effort is normal. No respiratory distress.      Breath sounds: Normal breath sounds. No stridor. No wheezing or rales.   Chest:      Chest wall: No tenderness.   Abdominal:      General: Bowel sounds are normal. There is no distension.      Palpations: Abdomen is soft.      Tenderness: There is no abdominal tenderness. There is no guarding or rebound.   Musculoskeletal: Normal range of motion.   Lymphadenopathy:      " Cervical: No cervical adenopathy.   Skin:     General: Skin is warm and dry.      Findings: No erythema.   Neurological:      General: No focal deficit present.      Mental Status: He is alert and oriented to person, place, and time.   Psychiatric:         Mood and Affect: Mood normal.         Behavior: Behavior normal.         Results Review:   Results from last 7 days   Lab Units 12/06/20  0526   SODIUM mmol/L 139   POTASSIUM mmol/L 4.3   CHLORIDE mmol/L 107   CO2 mmol/L 25.5   BUN mg/dL 19   CREATININE mg/dL 0.79   CALCIUM mg/dL 9.0   BILIRUBIN mg/dL 0.7   ALK PHOS U/L 127*   ALT (SGPT) U/L 134*   AST (SGOT) U/L 132*   GLUCOSE mg/dL 92     Results from last 7 days   Lab Units 11/30/20  1641   TROPONIN T ng/mL <0.010     Results from last 7 days   Lab Units 12/06/20  0526 12/03/20  2304 12/02/20  2328   WBC 10*3/mm3 6.32 6.53 7.89   HEMOGLOBIN g/dL 13.0 12.1* 13.1   HEMATOCRIT % 38.6 37.0* 40.0   PLATELETS 10*3/mm3 131* 133* 121*     Results from last 7 days   Lab Units 11/30/20  1641   INR  1.26*   APTT seconds 32.1               I personally viewed and interpreted the patient's EKG/Telemetry data     Assessment / Plan:     1.  IV drug abuse  2.  Fungemia  3.  Lactic acidosis  4.  Hepatitis C    Echocardiogram reviewed, with no significant valvular abnormalities.  Given blood cultures currently growing mucor species in the setting of IVDA, discussed the risk and benefits of proceeding with transesophageal echocardiogram to further rule out valvular endocarditis and the patient is agreeable to proceed.  Will make n.p.o. after midnight and plan for EMILY tomorrow.        Thank you for allowing me to participate in the care of your patient. Please do not hesitate to contact me with additional questions or concerns.     UTE Lerma MD  Interventional Cardiology   12/06/20  10:56 EST

## 2020-12-07 ENCOUNTER — ANESTHESIA (OUTPATIENT)
Dept: CARDIOLOGY | Facility: HOSPITAL | Age: 45
End: 2020-12-07

## 2020-12-07 ENCOUNTER — APPOINTMENT (OUTPATIENT)
Dept: CARDIOLOGY | Facility: HOSPITAL | Age: 45
End: 2020-12-07

## 2020-12-07 ENCOUNTER — ANESTHESIA EVENT (OUTPATIENT)
Dept: CARDIOLOGY | Facility: HOSPITAL | Age: 45
End: 2020-12-07

## 2020-12-07 LAB
BACTERIA SPEC AEROBE CULT: NORMAL
BACTERIA SPEC AEROBE CULT: NORMAL
SARS-COV-2 RNA PNL SPEC NAA+PROBE: NOT DETECTED

## 2020-12-07 PROCEDURE — 93320 DOPPLER ECHO COMPLETE: CPT

## 2020-12-07 PROCEDURE — 25010000002 FENTANYL CITRATE (PF) 100 MCG/2ML SOLUTION: Performed by: NURSE ANESTHETIST, CERTIFIED REGISTERED

## 2020-12-07 PROCEDURE — 25010000002 AMPHOTERICIN B LIPOSOME PER 10 MG: Performed by: INTERNAL MEDICINE

## 2020-12-07 PROCEDURE — 87635 SARS-COV-2 COVID-19 AMP PRB: CPT | Performed by: INTERNAL MEDICINE

## 2020-12-07 PROCEDURE — 25010000002 ENOXAPARIN PER 10 MG: Performed by: EMERGENCY MEDICINE

## 2020-12-07 PROCEDURE — 93312 ECHO TRANSESOPHAGEAL: CPT | Performed by: INTERNAL MEDICINE

## 2020-12-07 PROCEDURE — 93320 DOPPLER ECHO COMPLETE: CPT | Performed by: INTERNAL MEDICINE

## 2020-12-07 PROCEDURE — 93325 DOPPLER ECHO COLOR FLOW MAPG: CPT

## 2020-12-07 PROCEDURE — B24BZZ4 ULTRASONOGRAPHY OF HEART WITH AORTA, TRANSESOPHAGEAL: ICD-10-PCS | Performed by: INTERNAL MEDICINE

## 2020-12-07 PROCEDURE — 93312 ECHO TRANSESOPHAGEAL: CPT

## 2020-12-07 PROCEDURE — 63710000001 DIPHENHYDRAMINE PER 50 MG: Performed by: INTERNAL MEDICINE

## 2020-12-07 PROCEDURE — 05HY33Z INSERTION OF INFUSION DEVICE INTO UPPER VEIN, PERCUTANEOUS APPROACH: ICD-10-PCS | Performed by: INTERNAL MEDICINE

## 2020-12-07 PROCEDURE — 93325 DOPPLER ECHO COLOR FLOW MAPG: CPT | Performed by: INTERNAL MEDICINE

## 2020-12-07 PROCEDURE — 25010000002 KETOROLAC TROMETHAMINE PER 15 MG: Performed by: EMERGENCY MEDICINE

## 2020-12-07 PROCEDURE — 99231 SBSQ HOSP IP/OBS SF/LOW 25: CPT | Performed by: INTERNAL MEDICINE

## 2020-12-07 PROCEDURE — 25010000002 PROPOFOL 1000 MG/100ML EMULSION: Performed by: NURSE ANESTHETIST, CERTIFIED REGISTERED

## 2020-12-07 PROCEDURE — 25010000003 MEPERIDINE PER 100 MG: Performed by: INTERNAL MEDICINE

## 2020-12-07 RX ORDER — ACETAMINOPHEN 325 MG/1
650 TABLET ORAL ONCE
Status: COMPLETED | OUTPATIENT
Start: 2020-12-07 | End: 2020-12-07

## 2020-12-07 RX ORDER — SODIUM CHLORIDE 0.9 % (FLUSH) 0.9 %
10 SYRINGE (ML) INJECTION EVERY 12 HOURS SCHEDULED
Status: DISCONTINUED | OUTPATIENT
Start: 2020-12-07 | End: 2020-12-15

## 2020-12-07 RX ORDER — MEPERIDINE HYDROCHLORIDE 25 MG/ML
25 INJECTION INTRAMUSCULAR; INTRAVENOUS; SUBCUTANEOUS ONCE
Status: COMPLETED | OUTPATIENT
Start: 2020-12-07 | End: 2020-12-07

## 2020-12-07 RX ORDER — SODIUM CHLORIDE 0.9 % (FLUSH) 0.9 %
10 SYRINGE (ML) INJECTION AS NEEDED
Status: DISCONTINUED | OUTPATIENT
Start: 2020-12-07 | End: 2020-12-15

## 2020-12-07 RX ORDER — SODIUM CHLORIDE 0.9 % (FLUSH) 0.9 %
20 SYRINGE (ML) INJECTION AS NEEDED
Status: DISCONTINUED | OUTPATIENT
Start: 2020-12-07 | End: 2020-12-15

## 2020-12-07 RX ORDER — PROPOFOL 10 MG/ML
INJECTION, EMULSION INTRAVENOUS AS NEEDED
Status: DISCONTINUED | OUTPATIENT
Start: 2020-12-07 | End: 2020-12-07 | Stop reason: SURG

## 2020-12-07 RX ORDER — FENTANYL CITRATE 50 UG/ML
INJECTION, SOLUTION INTRAMUSCULAR; INTRAVENOUS AS NEEDED
Status: DISCONTINUED | OUTPATIENT
Start: 2020-12-07 | End: 2020-12-07 | Stop reason: SURG

## 2020-12-07 RX ORDER — LIDOCAINE HYDROCHLORIDE 20 MG/ML
INJECTION, SOLUTION INTRAVENOUS AS NEEDED
Status: DISCONTINUED | OUTPATIENT
Start: 2020-12-07 | End: 2020-12-07 | Stop reason: SURG

## 2020-12-07 RX ORDER — DIPHENHYDRAMINE HCL 25 MG
50 CAPSULE ORAL DAILY PRN
Status: DISCONTINUED | OUTPATIENT
Start: 2020-12-07 | End: 2020-12-15 | Stop reason: HOSPADM

## 2020-12-07 RX ORDER — DIPHENHYDRAMINE HCL 25 MG
50 CAPSULE ORAL ONCE
Status: DISCONTINUED | OUTPATIENT
Start: 2020-12-07 | End: 2020-12-08

## 2020-12-07 RX ORDER — MEPERIDINE HYDROCHLORIDE 25 MG/ML
12.5 INJECTION INTRAMUSCULAR; INTRAVENOUS; SUBCUTANEOUS
Status: DISCONTINUED | OUTPATIENT
Start: 2020-12-07 | End: 2020-12-08

## 2020-12-07 RX ORDER — MEPERIDINE HYDROCHLORIDE 25 MG/ML
25 INJECTION INTRAMUSCULAR; INTRAVENOUS; SUBCUTANEOUS
Status: DISCONTINUED | OUTPATIENT
Start: 2020-12-07 | End: 2020-12-08

## 2020-12-07 RX ADMIN — SODIUM CHLORIDE, PRESERVATIVE FREE 10 ML: 5 INJECTION INTRAVENOUS at 15:33

## 2020-12-07 RX ADMIN — TRAMADOL HYDROCHLORIDE 50 MG: 50 TABLET, FILM COATED ORAL at 03:19

## 2020-12-07 RX ADMIN — KETOROLAC TROMETHAMINE 15 MG: 30 INJECTION, SOLUTION INTRAMUSCULAR at 03:19

## 2020-12-07 RX ADMIN — SODIUM CHLORIDE, PRESERVATIVE FREE 10 ML: 5 INJECTION INTRAVENOUS at 20:07

## 2020-12-07 RX ADMIN — PROPOFOL 50 MG: 10 INJECTION, EMULSION INTRAVENOUS at 12:05

## 2020-12-07 RX ADMIN — MICAFUNGIN SODIUM 100 MG: 20 INJECTION, POWDER, LYOPHILIZED, FOR SOLUTION INTRAVENOUS at 03:19

## 2020-12-07 RX ADMIN — Medication 1 PATCH: at 03:20

## 2020-12-07 RX ADMIN — ENOXAPARIN SODIUM 40 MG: 40 INJECTION SUBCUTANEOUS at 03:20

## 2020-12-07 RX ADMIN — TRAMADOL HYDROCHLORIDE 50 MG: 50 TABLET, FILM COATED ORAL at 11:15

## 2020-12-07 RX ADMIN — AMPHOTERICIN B 350 MG: 50 INJECTION, POWDER, LYOPHILIZED, FOR SOLUTION INTRAVENOUS at 16:44

## 2020-12-07 RX ADMIN — SODIUM CHLORIDE 500 ML: 9 INJECTION, SOLUTION INTRAVENOUS at 15:30

## 2020-12-07 RX ADMIN — LIDOCAINE HYDROCHLORIDE 100 MG: 20 INJECTION, SOLUTION INTRAVENOUS at 12:05

## 2020-12-07 RX ADMIN — PROPRANOLOL HYDROCHLORIDE 20 MG: 20 TABLET ORAL at 09:53

## 2020-12-07 RX ADMIN — SPIRONOLACTONE 50 MG: 25 TABLET, FILM COATED ORAL at 09:53

## 2020-12-07 RX ADMIN — KETOROLAC TROMETHAMINE 15 MG: 30 INJECTION, SOLUTION INTRAMUSCULAR at 11:15

## 2020-12-07 RX ADMIN — ACETAMINOPHEN 650 MG: 325 TABLET, FILM COATED ORAL at 15:20

## 2020-12-07 RX ADMIN — FUROSEMIDE 20 MG: 20 TABLET ORAL at 09:53

## 2020-12-07 RX ADMIN — DIPHENHYDRAMINE HYDROCHLORIDE 50 MG: 25 CAPSULE ORAL at 15:20

## 2020-12-07 RX ADMIN — HYDROCODONE BITARTRATE AND ACETAMINOPHEN 1 TABLET: 5; 325 TABLET ORAL at 09:53

## 2020-12-07 RX ADMIN — HYDROCODONE BITARTRATE AND ACETAMINOPHEN 1 TABLET: 5; 325 TABLET ORAL at 00:59

## 2020-12-07 RX ADMIN — MEPERIDINE HYDROCHLORIDE 25 MG: 25 INJECTION, SOLUTION INTRAMUSCULAR; INTRAVENOUS; SUBCUTANEOUS at 15:26

## 2020-12-07 RX ADMIN — FENTANYL CITRATE 100 MCG: 50 INJECTION, SOLUTION INTRAMUSCULAR; INTRAVENOUS at 12:05

## 2020-12-07 RX ADMIN — SODIUM CHLORIDE, PRESERVATIVE FREE 10 ML: 5 INJECTION INTRAVENOUS at 20:03

## 2020-12-07 RX ADMIN — PROPRANOLOL HYDROCHLORIDE 20 MG: 20 TABLET ORAL at 20:04

## 2020-12-07 NOTE — PROGRESS NOTES
[facility]    Name:  Stephen Diallo   Age:  45 y.o.  Sex:  male  :  1975  MRN:  [id number]   Visit Number:  [id number]  Admission Date:  12/3/2020  Date Of Service:  20  Primary Care Physician:  Provider, No Known     LOS: 4 days :  Patient Care Team:  Provider, No Known as PCP - General  Provider, No Known as PCP - Family Medicine:      Subjective / Interval History:     45 M h/o IVDA, hepatitis C status post treatment, incarceration who has been having fungemia.  Patient was admitted and had signed out AMA has returned back.  Chart and events reviewed from the prior admit.  Patient has been started on micafungin since this admission again    Today on  patient has been evaluated.  He is n.p.o. and going to get EMILY today by Dr. Lerma    Vital Signs:    Temp:  [97.7 °F (36.5 °C)-98.7 °F (37.1 °C)] 97.7 °F (36.5 °C)  Heart Rate:  [57-73] 57  Resp:  [18-20] 20  BP: ()/(59-74) 117/67    Intake and output:    I/O last 3 completed shifts:  In: 1120 [P.O.:1120]  Out:  [Urine:]  No intake/output data recorded.    Physical Examination:    General Appearance:    Alert and cooperative, not in any acute distress.   Head:    Atraumatic and normocephalic, without obvious abnormality.   Eyes:            PERRLA,  No pallor. Extraocular movements are within normal limits.   Neck:   Supple,  No lymph glands, no bruit   Lungs:     Chest shape is normal. Breath sounds heard bilaterally equally.  No crackles or wheezing.     Heart:    Normal S1 and S2, no murmur,  No JVD   Abdomen:     Normal bowel sounds, no masses, no organomegaly. Soft     nontender, no guarding, no rebound tenderness   Extremities:   Moves all extremities well, no edema, no cyanosis,    Skin:   No  bruising or rash.   Neurologic:   Grossly nonfocal and moves all extremities.      Laboratory results:  Results from last 7 days   Lab Units 20  0526 20  2304 20  0742    SODIUM mmol/L 139 140 141   POTASSIUM mmol/L 4.3 3.8 4.0   CHLORIDE mmol/L 107 106 107   CO2 mmol/L 25.5 25.1 27.9   BUN mg/dL 19 16 16   CREATININE mg/dL 0.79 0.74* 0.76   CALCIUM mg/dL 9.0 8.9 9.2   BILIRUBIN mg/dL 0.7 0.8 0.8   ALK PHOS U/L 127* 123* 119*   ALT (SGPT) U/L 134* 160* 168*   AST (SGOT) U/L 132* 158* 147*   GLUCOSE mg/dL 92 99 105*     Results from last 7 days   Lab Units 12/06/20  0526 12/03/20  2304 12/02/20  2328   WBC 10*3/mm3 6.32 6.53 7.89   HEMOGLOBIN g/dL 13.0 12.1* 13.1   HEMATOCRIT % 38.6 37.0* 40.0   PLATELETS 10*3/mm3 131* 133* 121*     Results from last 7 days   Lab Units 11/30/20  1641   INR  1.26*     Results from last 7 days   Lab Units 11/30/20  1641   TROPONIN T ng/mL <0.010     Results from last 7 days   Lab Units 12/02/20  2333 12/02/20  2327 11/30/20  1655 11/30/20  1650   BLOODCX  No growth at 4 days No growth at 4 days Mucor species* Yeast isolated*       Radiology results:    Imaging Results (Last 24 Hours)     ** No results found for the last 24 hours. **          I have reviewed the patient's radiology reports.    Medication Review:     I have reviewed the patients active and prn medications.     Assessment:      Fungemia    Lactic acidosis    Polysubstance abuse (CMS/HCC)  Hepatitis C s/p treatment  IV drug abuser    Plan:    Fungemia -patient at present is being treated for IV antifungals micafungin this was on the blood culture which was done on the 30th.  On the second December repeat cultures have been done and results are still pending.  Call lab to see if the sensitivity can be obtained    Patient will get EMILY done and there Dr. Lerma has been consulted.  He is n.p.o. to have it done today    Continue supportive care and rest of the medications as per orders    Ravin Wong MD  12/07/20  08:30 EST      Please note that portions of this note were completed with a voice recognition program.

## 2020-12-07 NOTE — PROGRESS NOTES
[facility] Cardiology Preliminary EMILY Note    Name: Stephen Diallo  YOB: 1975  MRN #: [id number]    Procedure Date: 12/7/2020    Procedure Performed: Transesophageal echocardiogram    Indication: Fungemia    Procedure Details:  The risks and benefits of transesophageal echocardiogram were discussed with the patient, including the risk of esophageal injury, esophageal perforation, airway compromise, respiratory failure, and risks associated with sedation were discussed with the patient. All questions were answered, and the patient was agreeable to proceed.     The patient was brought to the procedure area in a fasting state. Sedation with propofol was administered by anesthesia. The oropharynx was anesthetized with lidocaine gel.  Once adequate sedation was achieved, the EMILY probe was passed into the mid esophagus without difficulty. Multiple pictures were obtained in the mid esophageal position. The probe was then advanced into the stomach and multiple transgastric views were obtained. Once all appropriate views were obtained, the EMILY probe was removed.     The patient tolerated the procedure well with no immediate tyrell-procedural complications.     Findings:  1.  Normal left ventricular systolic function.  2.  Small, mobile echodensity located on the tricuspid valve, cannot rule out vegetation  3.  Trace MR with no significant valvular abnormality  4.  No significant abnormalities of the aortic or pulmonic valves  5.  Full EMILY report to follow              Gregg Lerma MD  12/07/20  12:31 EST

## 2020-12-07 NOTE — PROGRESS NOTES
Continued Stay Note   [facility]     Patient Name: Stephen Diallo  MRN: [id number]  Today's Date: 12/7/2020    Admit Date: 12/3/2020    Discharge Plan     Row Name 12/07/20 1613       Plan    Plan  PT has PICC Line Consult, will likely need IV antibiotics. Spoke to Diane MARY and confirmed the plan.   Pt is homeless.  Spoke to pt in room and states he may go home with his brother but would be willing to go to a shelter or where ever it nec.  [place]  Cm will need to send case to .        Discharge Codes    No documentation.       Expected Discharge Date and Time     Expected Discharge Date Expected Discharge Time    Dec 7, 2020             Mary Hillman RN

## 2020-12-07 NOTE — ANESTHESIA PREPROCEDURE EVALUATION
Anesthesia Evaluation     Patient summary reviewed and Nursing notes reviewed   NPO Solid Status: > 8 hours  NPO Liquid Status: > 8 hours           Airway   Mallampati: II  TM distance: >3 FB  Neck ROM: full  No difficulty expected  Dental - normal exam     Pulmonary - normal exam   (+) a smoker Current Smoked day of surgery,   Cardiovascular - normal exam    (+) hypertension well controlled less than 2 medications,       Neuro/Psych  GI/Hepatic/Renal/Endo      Musculoskeletal     Abdominal  - normal exam   Substance History      OB/GYN          Other                        Anesthesia Plan    ASA 2     MAC     intravenous induction     Anesthetic plan, all risks, benefits, and alternatives have been provided, discussed and informed consent has been obtained with: patient.    Plan discussed with CRNA.

## 2020-12-07 NOTE — ANESTHESIA POSTPROCEDURE EVALUATION
Patient: Stephen Diallo    Procedure Summary     Date: 12/07/20 Room / Location: [facility] LAB    Anesthesia Start: 1205 Anesthesia Stop: 1217    Procedure: ADULT TRANSESOPHAGEAL ECHO (EMILY) W/ CONT IF NECESSARY PER PROTOCOL Diagnosis: (Endocarditis)    Scheduled Providers:  Provider: Kurtis Ji CRNA    Anesthesia Type: MAC ASA Status: 2          Anesthesia Type: MAC    Vitals  Vitals Value Taken Time   /68 12/07/20 1205   Temp     Pulse 53 12/07/20 1205   Resp 16 12/07/20 1205   SpO2 99 % 12/07/20 1205           Post Anesthesia Care and Evaluation    Patient location during evaluation: bedside  Patient participation: complete - patient participated  Level of consciousness: awake  Pain score: 0  Pain management: adequate  Airway patency: patent  Anesthetic complications: No anesthetic complications  PONV Status: controlled  Cardiovascular status: acceptable and stable  Respiratory status: acceptable and room air  Hydration status: acceptable

## 2020-12-08 LAB
ANION GAP SERPL CALCULATED.3IONS-SCNC: 5.7 MMOL/L (ref 5–15)
BUN SERPL-MCNC: 17 MG/DL (ref 6–20)
BUN/CREAT SERPL: 21.3 (ref 7–25)
CALCIUM SPEC-SCNC: 9.2 MG/DL (ref 8.6–10.5)
CHLORIDE SERPL-SCNC: 109 MMOL/L (ref 98–107)
CO2 SERPL-SCNC: 26.3 MMOL/L (ref 22–29)
CREAT SERPL-MCNC: 0.8 MG/DL (ref 0.76–1.27)
GFR SERPL CREATININE-BSD FRML MDRD: 105 ML/MIN/1.73
GLUCOSE SERPL-MCNC: 122 MG/DL (ref 65–99)
MAGNESIUM SERPL-MCNC: 2 MG/DL (ref 1.6–2.6)
POTASSIUM SERPL-SCNC: 4.4 MMOL/L (ref 3.5–5.2)
SODIUM SERPL-SCNC: 141 MMOL/L (ref 136–145)

## 2020-12-08 PROCEDURE — C1751 CATH, INF, PER/CENT/MIDLINE: HCPCS

## 2020-12-08 PROCEDURE — C1894 INTRO/SHEATH, NON-LASER: HCPCS

## 2020-12-08 PROCEDURE — 80048 BASIC METABOLIC PNL TOTAL CA: CPT | Performed by: INTERNAL MEDICINE

## 2020-12-08 PROCEDURE — 99232 SBSQ HOSP IP/OBS MODERATE 35: CPT | Performed by: INTERNAL MEDICINE

## 2020-12-08 PROCEDURE — 25010000002 AMPHOTERICIN B LIPOSOME PER 10 MG: Performed by: INTERNAL MEDICINE

## 2020-12-08 PROCEDURE — 25010000002 ENOXAPARIN PER 10 MG: Performed by: EMERGENCY MEDICINE

## 2020-12-08 PROCEDURE — 25010000002 KETOROLAC TROMETHAMINE PER 15 MG: Performed by: EMERGENCY MEDICINE

## 2020-12-08 PROCEDURE — 63710000001 DIPHENHYDRAMINE PER 50 MG: Performed by: INTERNAL MEDICINE

## 2020-12-08 PROCEDURE — 83735 ASSAY OF MAGNESIUM: CPT | Performed by: INTERNAL MEDICINE

## 2020-12-08 RX ORDER — SODIUM CHLORIDE 0.9 % (FLUSH) 0.9 %
20 SYRINGE (ML) INJECTION AS NEEDED
Status: DISCONTINUED | OUTPATIENT
Start: 2020-12-08 | End: 2020-12-15

## 2020-12-08 RX ORDER — SODIUM CHLORIDE 0.9 % (FLUSH) 0.9 %
10 SYRINGE (ML) INJECTION EVERY 12 HOURS SCHEDULED
Status: DISCONTINUED | OUTPATIENT
Start: 2020-12-08 | End: 2020-12-15

## 2020-12-08 RX ORDER — DIPHENHYDRAMINE HCL 25 MG
50 CAPSULE ORAL EVERY 24 HOURS
Status: DISCONTINUED | OUTPATIENT
Start: 2020-12-08 | End: 2020-12-15

## 2020-12-08 RX ORDER — SODIUM CHLORIDE 0.9 % (FLUSH) 0.9 %
10 SYRINGE (ML) INJECTION AS NEEDED
Status: DISCONTINUED | OUTPATIENT
Start: 2020-12-08 | End: 2020-12-15

## 2020-12-08 RX ORDER — ACETAMINOPHEN 325 MG/1
650 TABLET ORAL EVERY 24 HOURS
Status: DISCONTINUED | OUTPATIENT
Start: 2020-12-08 | End: 2020-12-15

## 2020-12-08 RX ORDER — CLONAZEPAM 0.5 MG/1
0.5 TABLET ORAL 2 TIMES DAILY PRN
Status: DISCONTINUED | OUTPATIENT
Start: 2020-12-08 | End: 2020-12-15 | Stop reason: HOSPADM

## 2020-12-08 RX ORDER — DEXTROSE MONOHYDRATE 50 MG/ML
250 INJECTION, SOLUTION INTRAVENOUS EVERY 24 HOURS
Status: DISCONTINUED | OUTPATIENT
Start: 2020-12-08 | End: 2020-12-15

## 2020-12-08 RX ADMIN — SODIUM CHLORIDE 500 ML: 9 INJECTION, SOLUTION INTRAVENOUS at 15:14

## 2020-12-08 RX ADMIN — KETOROLAC TROMETHAMINE 15 MG: 30 INJECTION, SOLUTION INTRAMUSCULAR at 15:15

## 2020-12-08 RX ADMIN — PROPRANOLOL HYDROCHLORIDE 20 MG: 20 TABLET ORAL at 22:00

## 2020-12-08 RX ADMIN — SPIRONOLACTONE 50 MG: 25 TABLET, FILM COATED ORAL at 10:00

## 2020-12-08 RX ADMIN — Medication 1 PATCH: at 03:03

## 2020-12-08 RX ADMIN — ACETAMINOPHEN 650 MG: 325 TABLET, FILM COATED ORAL at 16:46

## 2020-12-08 RX ADMIN — HYDROCODONE BITARTRATE AND ACETAMINOPHEN 1 TABLET: 5; 325 TABLET ORAL at 05:51

## 2020-12-08 RX ADMIN — HYDROCODONE BITARTRATE AND ACETAMINOPHEN 1 TABLET: 5; 325 TABLET ORAL at 13:38

## 2020-12-08 RX ADMIN — CLONAZEPAM 0.5 MG: 0.5 TABLET ORAL at 16:45

## 2020-12-08 RX ADMIN — SODIUM CHLORIDE, PRESERVATIVE FREE 10 ML: 5 INJECTION INTRAVENOUS at 22:00

## 2020-12-08 RX ADMIN — FUROSEMIDE 20 MG: 20 TABLET ORAL at 10:00

## 2020-12-08 RX ADMIN — DEXTROSE MONOHYDRATE 250 ML: 50 INJECTION, SOLUTION INTRAVENOUS at 19:52

## 2020-12-08 RX ADMIN — ENOXAPARIN SODIUM 40 MG: 40 INJECTION SUBCUTANEOUS at 03:08

## 2020-12-08 RX ADMIN — AMPHOTERICIN B 350 MG: 50 INJECTION, POWDER, LYOPHILIZED, FOR SOLUTION INTRAVENOUS at 16:30

## 2020-12-08 RX ADMIN — TRAMADOL HYDROCHLORIDE 50 MG: 50 TABLET, FILM COATED ORAL at 15:14

## 2020-12-08 RX ADMIN — PROPRANOLOL HYDROCHLORIDE 20 MG: 20 TABLET ORAL at 10:00

## 2020-12-08 RX ADMIN — SODIUM CHLORIDE, PRESERVATIVE FREE 10 ML: 5 INJECTION INTRAVENOUS at 10:24

## 2020-12-08 RX ADMIN — DIPHENHYDRAMINE HYDROCHLORIDE 50 MG: 25 CAPSULE ORAL at 16:46

## 2020-12-08 NOTE — PROGRESS NOTES
Continued Stay Note   [facility]     Patient Name: Stephen Diallo  MRN: [id number]  Today's Date: 12/8/2020    Admit Date: 12/3/2020    Discharge Plan     Row Name 12/08/20 1409       Plan    Plan  Communicated with patient, MD and nurse, patient will need long term acute care for IV antibiotics. Faxed clinicals to all three nearby facilities. Waiting on review and bed status. Provided patient with number to call for disability. Patient to call from his room.   [facility] has denied patient due to cost of medication being so high.     Discharge Codes    No documentation.       Expected Discharge Date and Time     Expected Discharge Date Expected Discharge Time    Dec 7, 2020             Griselda Oneill LCSW

## 2020-12-08 NOTE — PLAN OF CARE
Goal Outcome Evaluation:  Plan of Care Reviewed With: patient  Progress: improving   No acute changes. VSS. Tolerating oral meds. Has not requested any extra meds. Will continue to monitor.

## 2020-12-08 NOTE — PROGRESS NOTES
[facility]    Name:  Stephen Diallo   Age:  45 y.o.  Sex:  male  :  1975  MRN:  [id number]   Visit Number:  [id number]  Admission Date:  12/3/2020  Date Of Service:  20  Primary Care Physician:  Provider, No Known     LOS: 5 days :  Patient Care Team:  Provider, No Known as PCP - General  Provider, No Known as PCP - Family Medicine:      Subjective / Interval History:     45 M h/o IVDA, hepatitis C status post treatment, incarceration who has been having fungemia.  Patient was admitted and had signed out AMA has returned back.  Chart and events reviewed from the prior admit.  Patient has been started on micafungin since this admission Dr. Lerma did a EMILY and found vegetation on the tricuspid valve.  The blood culture showed 2 different organisms Mucor species and rhodotorula species  For the infectious disease pharmacist and liver will was consulted and patient has been started on amphotericin B liposome    Today on  the patient has been seen and evaluated.  PICC line is to be placed today.  Patient is feeling slightly better  Vital Signs:    Temp:  [97.1 °F (36.2 °C)-98.3 °F (36.8 °C)] 98 °F (36.7 °C)  Heart Rate:  [53-63] 62  Resp:  [16-18] 16  BP: (103-128)/(51-73) 124/51    Intake and output:    I/O last 3 completed shifts:  In: 360 [P.O.:360]  Out: 3600 [Urine:3600]  No intake/output data recorded.    Physical Examination:    General Appearance:    Alert and cooperative, not in any acute distress.   Head:    Atraumatic and normocephalic, without obvious abnormality.   Eyes:            PERRLA,  No pallor. Extraocular movements are within normal limits.   Neck:   Supple,  No lymph glands, no bruit   Lungs:     Chest shape is normal. Breath sounds heard bilaterally equally.  No crackles or wheezing.     Heart:    Normal S1 and S2, no murmur,  No JVD   Abdomen:     Normal bowel sounds, no masses, no organomegaly. Soft     nontender, no  guarding, no rebound tenderness   Extremities:   Moves all extremities well, no edema, no cyanosis,    Skin:   No  bruising or rash.   Neurologic:   Grossly nonfocal and moves all extremities.      Laboratory results:  Results from last 7 days   Lab Units 12/08/20  0653 12/06/20  0526 12/03/20  2304 12/02/20  2328   SODIUM mmol/L 141 139 140 141   POTASSIUM mmol/L 4.4 4.3 3.8 4.0   CHLORIDE mmol/L 109* 107 106 107   CO2 mmol/L 26.3 25.5 25.1 27.9   BUN mg/dL 17 19 16 16   CREATININE mg/dL 0.80 0.79 0.74* 0.76   CALCIUM mg/dL 9.2 9.0 8.9 9.2   BILIRUBIN mg/dL  --  0.7 0.8 0.8   ALK PHOS U/L  --  127* 123* 119*   ALT (SGPT) U/L  --  134* 160* 168*   AST (SGOT) U/L  --  132* 158* 147*   GLUCOSE mg/dL 122* 92 99 105*     Results from last 7 days   Lab Units 12/06/20  0526 12/03/20  2304 12/02/20  2328   WBC 10*3/mm3 6.32 6.53 7.89   HEMOGLOBIN g/dL 13.0 12.1* 13.1   HEMATOCRIT % 38.6 37.0* 40.0   PLATELETS 10*3/mm3 131* 133* 121*             Results from last 7 days   Lab Units 12/02/20  2333 12/02/20  2327   BLOODCX  No growth at 5 days No growth at 5 days       Radiology results:    Imaging Results (Last 24 Hours)     ** No results found for the last 24 hours. **          I have reviewed the patient's radiology reports.    Medication Review:     I have reviewed the patients active and prn medications.     Assessment:      Fungemia    Lactic acidosis    Polysubstance abuse (CMS/HCC)  Hepatitis C s/p treatment  IV drug abuser    Plan:    Fungemia -patient at present is being treated for IV antifungals micafungin this was on the blood culture which was done on the 30th.  He is growing 2 different species one is Mucor species and the second is rhodotorula speices   Based on the findings the micafungin has been DC'd and amphotericin B liposomal has been started.  Patient will need that at least for 4 weeks.    PICC line to be placed today and arrangements for placement to be started    Continue supportive care and rest of  the medications as per orders    Ravin Wong MD  12/08/20  09:02 EST      Please note that portions of this note were completed with a voice recognition program.

## 2020-12-08 NOTE — PROGRESS NOTES
"Adult Nutrition  Assessment/PES    Patient Name:  Stephen Diallo  YOB: 1975  MRN: [id number]  Admit Date:  12/3/2020    Assessment Date:  12/8/2020    Comments:    Recommend:  1. Continue current diet order as medically appropriate and tolerated.  2. Encourage PO intake. PO intake average ~78% x 7 meals.  3. RD ordered Boost Plus BID.  4. Consider a multivitamin with minerals daily.  5. Continue to monitor and replace electrolytes PRN.     RD to follow pt and available PRN.      Reason for Assessment     Row Name 12/08/20 1450          Reason for Assessment    Reason For Assessment  diagnosis/disease state     Diagnosis  substance use/abuse;liver disease;infection/sepsis IV drug abuse, Hepatitis C post treatment, Tobacco abuse, Cirrhosis, Fungemia, Lactic acidosis           Anthropometrics     Row Name 12/08/20 1451          Anthropometrics    Height  165.1 cm (65\")        Ideal Body Weight (IBW)    Ideal Body Weight (IBW) (kg)  62.51         Labs/Tests/Procedures/Meds     Row Name 12/08/20 1450          Labs/Procedures/Meds    Lab Results Reviewed  reviewed, pertinent     Lab Results Comments  Low: Platelets, Alb, Phos High: Cl-, Gluc, ALT, Procal, ALT        Medications    Pertinent Medications Reviewed  reviewed, pertinent     Pertinent Medications Comments  Lasix           Estimated/Assessed Needs     Row Name 12/08/20 1451          Calculation Measurements    Weight Used For Calculations  69.4 kg (153 lb) Actual BW     Height  165.1 cm (65\")        Estimated/Assessed Needs    Additional Documentation  Calorie Requirements (Group);Protein Requirements (Group);[place]-St. Jeor Equation (Group);Fluid Requirements (Group)        Calorie Requirements    Estimated Calorie Need Method  [place]-St Jeor        [place]-St. Jeor Equation    RMR ([place]-St. Jeor Equation)  1510.875        Protein Requirements    Weight Used For Protein Calculations  69.4 kg (153 lb) Actual BW     Est Protein Requirement " Amount (gms/kg)  1.4 gm protein 70 - 97 gm     Estimated Protein Requirements (gms/day)  97.16        Fluid Requirements    Estimated Fluid Requirement Method  Fredi-Segar Formula     Fredi-Segar Method (over 20 kg)  2888         Nutrition Prescription Ordered     Row Name 12/08/20 1453          Nutrition Prescription PO    Current PO Diet  Regular     Common Modifiers  Cardiac         Evaluation of Received Nutrient/Fluid Intake     Row Name 12/08/20 1453          PO Evaluation    Number of Days PO Intake Evaluated  3 days     Number of Meals  7     % PO Intake  78               Problem/Interventions:  Problem 1     Row Name 12/08/20 1454          Nutrition Diagnoses Problem 1    Problem 1  Increased Nutrient Needs     Macronutrient  Protein;Kcal     Etiology (related to)  Medical Diagnosis     Hepatic  Cirrhosis;Hepatitis     Infectious Disease  Other (comment) Fungemia     Signs/Symptoms (evidenced by)  Report/Observation     Reported/Observed By  MD               Intervention Goal     Row Name 12/08/20 1455          Intervention Goal    General  Meet nutritional needs for age/condition;Improved nutrition related lab(s)     PO  Meet estimated needs;Maintain intake;PO intake (%)     PO Intake %  -- 75 - 100%     Weight  Maintain weight         Nutrition Intervention     Row Name 12/08/20 1455          Nutrition Intervention    RD/Tech Action  Encourage intake;Recommend/ordered;Follow Tx progress     Recommended/Ordered  Supplement         Nutrition Prescription     Row Name 12/08/20 1455          Nutrition Prescription PO    PO Prescription  Begin/change supplement;Other (comment) Continue current diet order as medically appropriate and tolerated     Supplement  Boost Plus     Supplement Frequency  2 times a day     New PO Prescription Ordered?  Yes Supplement ordered        Other Orders    Obtain Weight  Daily     Obtain Weight Ordered?  No, recommended     Supplement  Vitamin mineral supplement      Supplement Ordered?  No, recommended     Other  Continue to monitor and replace electrolytes PRN         Education/Evaluation     Row Name 12/08/20 1456          Education    Education  Will Instruct as appropriate        Monitor/Evaluation    Monitor  Skin status;Per protocol;PO intake;I&O;Supplement intake;Pertinent labs;Weight           Electronically signed by:  Preethi Blandon RD  12/08/20 14:57 EST

## 2020-12-08 NOTE — DISCHARGE PLACEMENT REQUEST
"Stephen Sharif (45 y.o. Male)     Date of Birth Social Security Number Address Home Phone MRN    1975  [address]  [facility] [address] [phone] [id number]    Jew Marital Status          None Single       Admission Date Admission Type Admitting Provider Attending Provider Department, Room/Bed    12/3/20 Emergency Alcides Rasmussen MD Pais, Roshan, MD [facility] MED SURG  4, 432/1    Discharge Date Discharge Disposition Discharge Destination                       Attending Provider: Alcides Rasmussen MD    Allergies: No Known Allergies    Isolation: None   Infection: None   Code Status: CPR    Ht: 165.1 cm (65\")   Wt: 69.4 kg (153 lb)    Admission Cmt: None   Principal Problem: Fungemia [B49]                 Active Insurance as of 12/3/2020     Primary Coverage     Payor Plan Insurance Group Employer/Plan Group    [employer]AN PASSPORT [id number]     Payor Plan Address Payor Plan Phone Number Payor Plan Fax Number Effective Dates    [address] [phone]  10/1/2015 - None Entered    [facility] [id number]       Subscriber Name Subscriber Birth Date Member ID       STEPHEN SHARIF 1975 [id number]                 Emergency Contacts      (Rel.) Home Phone Work Phone Mobile Phone    Kelly Lewis (Sister) [phone] -- --    YobaniSandra alexandre (Mother) [phone] -- --            Emergency Contact Information     Name Relation Home Work Mobile    Kelly Lewis Sister [phone]      Sandra Simpson Mother [phone]            Insurance Information                P[employer] HE[employer] PLAN/PASSPORT Phone: [phone]    Subscriber: Stephen Sharif Subscriber#: [id number]    Group#: [id number] Precert#:              History & Physical      Avis Fabian DO at 20 0145              [facility]   [facility]ISTORY AND [facility]YSICAL      Name:  Stephen Sharif   Age:  45 y.o.  Sex:  male  :  1975  MRN:  [id number]   Visit Number:  " "[id number]  Admission Date:  12/3/2020  Date Of Service:  12/04/20  Primary Care Physician:  Provider, No Known    Chief Complaint: \"I want to get better\"     History Of Presenting Illness:       45 M h/o IVDA, tobaccoism, hepatitis C status post treatment, incarceration who was brought to the ED after he was found sitting in the [employer] bathroom with needles beside him very lethargic.  He triggered sepsis criteria upon arrival in the ED 3 days ago, blood cultures took 48 hours to grow fungus. He was receiving IV rick. Patient eloped with an IV yday, and returned to the ED last night feeling regretful. He still has his IV in place. Susceptibilities and specificities are pending.  He denies visual symptoms or pain in any specific location.     Review Of Systems:     A full 10 point review of systems was performed and all pertinent positives and negatives are noted in the HPI.     Past Medical History:    Past Medical History:   Diagnosis Date   • Cancer (CMS/HCC)     skin       Past Surgical history:    History reviewed. No pertinent surgical history.    Social History:    Social History     Socioeconomic History   • Marital status: Single     Spouse name: Not on file   • Number of children: Not on file   • Years of education: Not on file   • Highest education level: Not on file   Tobacco Use   • Smoking status: Current Every Day Smoker     Packs/day: 1.00     Types: Cigarettes   • Smokeless tobacco: Former User   Substance and Sexual Activity   • Alcohol use: No     Frequency: Never   • Drug use: Yes     Types: IV     Comment: suboxone for opiods       Family History:    History reviewed. No pertinent family history.  Allergies:      Patient has no known allergies.    Home Medications:    Prior to Admission Medications     Prescriptions Last Dose Informant Patient Reported? Taking?    acetaminophen (TYLENOL 8 HOUR) 650 MG 8 hr tablet   No Yes    Take 1 tablet by mouth Every 8 (Eight) Hours As Needed for Mild Pain . "    albuterol sulfate  (90 Base) MCG/ACT inhaler   No Yes    Inhale 2 puffs Every 6 (Six) Hours As Needed for Wheezing or Shortness of Air.    furosemide (LASIX) 20 MG tablet   No Yes    Take 1 tablet by mouth Daily    propranolol (INDERAL) 20 MG tablet   No Yes    Take 1 tablet by mouth 2 (two) times a day.    spironolactone (ALDACTONE) 50 MG tablet   No Yes    Take 1 tablet by mouth Daily             ED Medications:    Medications - No data to display    Vital Signs:    Temp:  [97.5 °F (36.4 °C)-98.1 °F (36.7 °C)] 97.9 °F (36.6 °C)  Heart Rate:  [61-85] 81  Resp:  [18] 18  BP: (106-126)/(44-68) 126/63        12/03/20  2132 12/03/20  2355   Weight: 77.9 kg (171 lb 12.8 oz) 68.5 kg (151 lb)       Body mass index is 25.13 kg/m².    Physical Exam:  General: NAD  HEENT: EOMI, NC/AT  Heart: regular  Lungs: nonlabored  Abdomen: Soft, nondistended, nontender  Extremities: No edema  Neurological: A&O x3, moves all extremities  Psychological: good eye contact  Skin: warm, dry, tattoos    Laboratory data:    I have reviewed the labs done in the emergency room.    Results from last 7 days   Lab Units 12/03/20  2304 12/02/20  2328 12/02/20  0745  11/30/20  1641   SODIUM mmol/L 140 141 135*   < > 140   POTASSIUM mmol/L 3.8 4.0 4.0   < > 4.3   CHLORIDE mmol/L 106 107 108*   < > 107   CO2 mmol/L 25.1 27.9 22.5   < > 22.4   BUN mg/dL 16 16 19   < > 17   CREATININE mg/dL 0.74* 0.76 0.63*   < > 0.78   CALCIUM mg/dL 8.9 9.2 8.7   < > 8.8   BILIRUBIN mg/dL 0.8 0.8  --   --  1.4*   ALK PHOS U/L 123* 119*  --   --  157*   ALT (SGPT) U/L 160* 168*  --   --  197*   AST (SGOT) U/L 158* 147*  --   --  184*   GLUCOSE mg/dL 99 105* 109*   < > 120*    < > = values in this interval not displayed.     Results from last 7 days   Lab Units 12/03/20  2304 12/02/20  2328 12/02/20  0745   WBC 10*3/mm3 6.53 7.89 9.14   HEMOGLOBIN g/dL 12.1* 13.1 12.4*   HEMATOCRIT % 37.0* 40.0 38.5   PLATELETS 10*3/mm3 133* 121* 85*     Results from last 7 days    Lab Units 11/30/20  1641   INR  1.26*     Results from last 7 days   Lab Units 11/30/20  1641   TROPONIN T ng/mL <0.010     Results from last 7 days   Lab Units 11/30/20  1641   PROBNP pg/mL 104.1         Results from last 7 days   Lab Units 11/30/20  1641   LIPASE U/L 21         Results from last 7 days   Lab Units 11/30/20  1832   COLOR UA  Yellow   GLUCOSE UA  Negative   KETONES UA  Negative   LEUKOCYTES UA  Negative   PH, URINE  5.5   BILIRUBIN UA  Negative   UROBILINOGEN UA  1.0 E.U./dL     Pain Management Panel     Pain Management Panel Latest Ref Rng & Units 12/3/2020 11/30/2020    AMPHETAMINES SCREEN, URINE Negative Positive(A) Positive(A)    BARBITURATES SCREEN Negative Negative Negative    BENZODIAZEPINE SCREEN, URINE Negative Negative Negative    BUPRENORPHINEUR Negative Negative Negative    COCAINE SCREEN, URINE Negative Negative Negative    METHADONE SCREEN, URINE Negative Negative Negative    METHAMPHETAMINEUR Negative Positive(A) Positive(A)        Results from last 7 days   Lab Units 12/02/20  2333 12/02/20  2327 11/30/20  1655 11/30/20  1650   BLOODCX  No growth at 24 hours No growth at 24 hours Fungus (Organism type)* Abnormal Stain*     Radiology:    Imaging Results (Last 72 Hours)     ** No results found for the last 72 hours. **            Fungemia    Lactic acidosis    Polysubstance abuse (CMS/HCC)       Assessment:  Fungemia, with associated sepsis   Rule out endocarditis  Thrombocytopenia likely sepsis mediated versus cirrhosis  Polysubstance abuse  Hep C s/p treatment complicated by cirrhosis     Plan:   -continue empiric IV micafungin with plan to de-escalate to p.o. fluconazole high-dose if susceptible; PCT trending down  -Echocardiogram without veg, will consider cardio consult and EMILY   -Ophthalmology follow-up outpatient   -continue PO Lasix, spironolactone, nadolol  -high risk, inpatient, full code    Avis CarenDO  12/04/20  01:46 EST    Dictated utilizing Dragon  dictation.      Electronically signed by Avis Fabian DO at 12/04/20 0150       Oxygen Therapy (last day)     Date/Time   SpO2   Device (Oxygen Therapy)   Flow (L/min)   Oxygen Concentration (%)   ETCO2 (mmHg)    12/08/20 1102   99   room air   --   --   --    12/08/20 0742   98   room air   --   --   --    12/08/20 0400   --   room air   --   --   --    12/08/20 0344   97   --   --   --   --    12/08/20 0000   --   room air   --   --   --    12/07/20 2350   99   --   --   --   --    12/07/20 2040   99   --   --   --   --    12/07/20 2000   --   room air   --   --   --    12/07/20 1532   98   --   --   --   --    12/07/20 1223   98   --   --   --   --    12/07/20 12:09:02   99   --   --   --   --    12/07/20 12:05:16   99   --   --   --   --    12/07/20 12:00:50   99   --   --   --   --    12/07/20 11:58:19   100   --   --   --   --    12/07/20 11:52:06   99   --   --   --   --    12/07/20 0751   98   --   --   --   --    12/07/20 0410   99   --   --   --   --    12/07/20 0400   --   room air   --   --   --    12/07/20 0000   --   room air   --   --   --              Lines, Drains & Airways    Active LDAs     Name:   Placement date:   Placement time:   Site:   Days:    PICC Single Lumen 12/08/20 Right Basilic   12/08/20    0953    Basilic   less than 1    Peripheral IV 12/03/20 2305 Right Forearm   12/03/20    2305    Forearm   4                  Current Facility-Administered Medications   Medication Dose Route Frequency Provider Last Rate Last Admin   • acetaminophen (TYLENOL) tablet 650 mg  650 mg Oral Q4H PRN Avis Fabian DO       • amphotericin B liposome (AMBISOME) 350 mg in dextrose (D5W) 5 % 200 mL IVPB  350 mg Intravenous Q24H Ravin Wong MD       • dextrose 5 % (D5W) flush 10 mL  10 mL Intravenous Q24H Ravin Wong MD   10 mL at 12/07/20 1600   • dextrose 5 % (D5W) flush 10 mL  10 mL Intravenous Q24H Ravin Wong MD   10 mL at 12/07/20 1644   • diphenhydrAMINE (BENADRYL) capsule 50 mg  50 mg  Oral Once Ravin Wong MD       • diphenhydrAMINE (BENADRYL) capsule 50 mg  50 mg Oral Daily PRN Ravin Wong MD   50 mg at 12/07/20 1520   • enoxaparin (LOVENOX) syringe 40 mg  40 mg Subcutaneous Q24H [facility]   40 mg at 12/08/20 0308   • furosemide (LASIX) tablet 20 mg  20 mg Oral Daily [facility]   20 mg at 12/08/20 1000   • HYDROcodone-acetaminophen (NORCO) 5-325 MG per tablet 1 tablet  1 tablet Oral Q6H PRN [facility]   1 tablet at 12/08/20 1338   • ketorolac (TORADOL) injection 15 mg  15 mg Intravenous Q6H PRN [facility]   15 mg at 12/07/20 1115   • nicotine (NICODERM CQ) 21 MG/24HR patch 1 patch  1 patch Transdermal Q24H [facility],    1 patch at 12/08/20 0303   • ondansetron (ZOFRAN) injection 4 mg  4 mg Intravenous Q6H PRN [facility]       • ondansetron ODT (ZOFRAN-ODT) disintegrating tablet 4 mg  4 mg Oral Q6H PRN [facility]   4 mg at 12/05/20 1412   • propranolol (INDERAL) tablet 20 mg  20 mg Oral BID [facility]   20 mg at 12/08/20 1000   • sodium chloride 0.9 % bolus 500 mL  500 mL Intravenous Q24H Ravin Wong  mL/hr at 12/07/20 1530 500 mL at 12/07/20 1530   • sodium chloride 0.9 % flush 10 mL  10 mL Intravenous PRN CarenAvis werner, DO   10 mL at 12/06/20 1122   • sodium chloride 0.9 % flush 10 mL  10 mL Intravenous Q12H Ravin Wong MD   10 mL at 12/07/20 2007   • sodium chloride 0.9 % flush 10 mL  10 mL Intravenous Q12H Ravin Wong MD   10 mL at 12/07/20 2007   • sodium chloride 0.9 % flush 10 mL  10 mL Intravenous Q12H Ravin Wong MD   10 mL at 12/07/20 2003   • sodium chloride 0.9 % flush 10 mL  10 mL Intravenous PRN Ravin Wong MD       • sodium chloride 0.9 % flush 10 mL  10 mL Intravenous Q12H Alcides Rasmussen MD   10 mL at 12/08/20 1024   • sodium chloride 0.9 % flush 10 mL  10 mL Intravenous PRN Alcides Rasmussen MD       • sodium chloride 0.9 % flush 20 mL  20 mL Intravenous PRN Ravin Wong MD       • sodium  chloride 0.9 % flush 20 mL  20 mL Intravenous PRN Alcides Rasmussen MD       • spironolactone (ALDACTONE) tablet 50 mg  50 mg Oral Daily Avis Fabian DO   50 mg at 12/08/20 1000   • traMADol (ULTRAM) tablet 50 mg  50 mg Oral Q6H PRN Avis Fabian DO   50 mg at 12/07/20 1115     Orders (last 24 hrs)      Start     Ordered    12/09/20 0600  PICC Site Care  Weekly     Comments: Dressing Changes to PICC Site Every 7 Days and As Needed    12/08/20 0955    12/08/20 1600  amphotericin B liposome (AMBISOME) 350 mg in dextrose (D5W) 5 % 200 mL IVPB  Every 24 Hours      12/08/20 0817    12/08/20 1045  sodium chloride 0.9 % flush 10 mL  Every 12 Hours Scheduled      12/08/20 0955    12/08/20 0956  Verify Informed Consent for PICC Line Placement  Once      12/08/20 0955    12/08/20 0956  Catheter Care PICC  Per Order Details     Comments: 1) Follow PICC Protocol For Care  2) No Blood Pressure in Arm With PICC  3) Warm Packs to PICC Arm As Needed For Discomfort  4) Measure & Record Arm Circumference if Patient Experiences Pain, Swelling, Redness or Warmth in PICC Arm; Compare Measurement to Initial Measure, Report to Provider if Greater Than 3cm Difference  5) Follow Flush Protocol Per Facility    12/08/20 0955    12/08/20 0956  Ensure Needleless Connectors are In Place  Per Order Details     Comments: Change Needleless Connectors Per [facility] Policy    12/08/20 0955    12/08/20 0956  No Venipuncture or BP in PICC Arm  Continuous      12/08/20 0955    12/08/20 0956  For Sluggish / Occluded Line, Use CATHFLO Activase Per Policy (Per IV Nurse Only)  Once     Comments: Per Facility Policy    12/08/20 0955    12/08/20 0956  Ensure PICC Securing Device is in Use  Continuous      12/08/20 0955    12/08/20 0956  Do NOT Draw From PICC  Continuous      12/08/20 0955    12/08/20 0956  May Use PICC for Power Injected CT  Continuous      12/08/20 0955    12/08/20 0956  Notify Provider if PICC is Occluded  Until Discontinued      12/08/20 1842     12/08/20 0956  Use 3CG Technology For Placement Verification (PICC Nurse)  Once      12/08/20 0955    12/08/20 0955  sodium chloride 0.9 % flush 20 mL  As Needed      12/08/20 0955    12/08/20 0955  sodium chloride 0.9 % flush 10 mL  As Needed      12/08/20 0955    12/08/20 0907  Inpatient Case Management  Consult  Once     Provider:  (Not yet assigned)    12/08/20 0907    12/08/20 0600  Basic Metabolic Panel  Daily      12/07/20 1302    12/08/20 0600  Magnesium  Daily      12/07/20 1302    12/07/20 1800  dextrose 5 % (D5W) flush 10 mL  Every 24 Hours,   Status:  Discontinued      12/07/20 1302    12/07/20 1700  dextrose 5 % (D5W) flush 10 mL  Every 24 Hours      12/07/20 1330    12/07/20 1600  dextrose 5 % (D5W) flush 10 mL  Every 24 Hours,   Status:  Discontinued      12/07/20 1302    12/07/20 1600  meperidine (DEMEROL) injection 12.5 mg  Every 15 Minutes PRN,   Status:  Discontinued      12/07/20 1302    12/07/20 1600  meperidine (DEMEROL) injection 25 mg  Every 15 Minutes PRN,   Status:  Discontinued      12/07/20 1302    12/07/20 1600  amphotericin B liposome (AMBISOME) 350 mg in dextrose (D5W) 5 % 200 mL IVPB  Once      12/07/20 1508    12/07/20 1530  sodium chloride 0.9 % bolus 500 mL  Every 24 Hours      12/07/20 1302    12/07/20 1530  acetaminophen (TYLENOL) tablet 650 mg  Once      12/07/20 1302    12/07/20 1530  diphenhydrAMINE (BENADRYL) capsule 50 mg  Once      12/07/20 1302    12/07/20 1530  meperidine (DEMEROL) injection 25 mg  Once      12/07/20 1302    12/07/20 1530  diphenhydrAMINE (BENADRYL) capsule 50 mg  Daily PRN      12/07/20 1302    12/07/20 1500  amphotericin B liposome (AMBISOME) 300 mg in dextrose (D5W) 5 % 300 mL IVPB  Once,   Status:  Discontinued      12/07/20 1302    12/07/20 1500  dextrose 5 % (D5W) flush 10 mL  Every 24 Hours      12/07/20 1330    12/07/20 1415  sodium chloride 0.9 % flush 10 mL  Every 12 Hours Scheduled      12/07/20 1320    12/07/20 1415  sodium  chloride 0.9 % flush 10 mL  Every 12 Hours Scheduled      12/07/20 1320    12/07/20 1415  sodium chloride 0.9 % flush 10 mL  Every 12 Hours Scheduled      12/07/20 1320    12/07/20 1353  Diet Regular; Cardiac  Diet Effective Now      12/07/20 1354    12/07/20 1320  sodium chloride 0.9 % flush 10 mL  As Needed      12/07/20 1320    12/07/20 1320  sodium chloride 0.9 % flush 20 mL  As Needed      12/07/20 1320    12/04/20 2007  HYDROcodone-acetaminophen (NORCO) 5-325 MG per tablet 1 tablet  Every 6 Hours PRN      12/04/20 2007 12/04/20 0900  furosemide (LASIX) tablet 20 mg  Daily      12/04/20 0152 12/04/20 0900  spironolactone (ALDACTONE) tablet 50 mg  Daily      12/04/20 0152 12/04/20 0600  Incentive Spirometry  Every 4 Hours While Awake      12/04/20 0152    12/04/20 0400  Vital Signs  Every 4 Hours      12/04/20 0152    12/04/20 0300  enoxaparin (LOVENOX) syringe 40 mg  Every 24 Hours      12/04/20 0152    12/04/20 0245  propranolol (INDERAL) tablet 20 mg  2 times daily      12/04/20 0152    12/04/20 0245  nicotine (NICODERM CQ) 21 MG/24HR patch 1 patch  Every 24 Hours      12/04/20 0152    12/04/20 0152  Intake & Output  Every Shift      12/04/20 0152    12/04/20 0151  ketorolac (TORADOL) injection 15 mg  Every 6 Hours PRN      12/04/20 0152    12/04/20 0151  traMADol (ULTRAM) tablet 50 mg  Every 6 Hours PRN      12/04/20 0152    12/04/20 0151  ondansetron ODT (ZOFRAN-ODT) disintegrating tablet 4 mg  Every 6 Hours PRN      12/04/20 0152    12/04/20 0151  sodium chloride 0.9 % flush 10 mL  As Needed      12/04/20 0152    12/04/20 0151  acetaminophen (TYLENOL) tablet 650 mg  Every 4 Hours PRN      12/04/20 0152    12/04/20 0151  ondansetron (ZOFRAN) injection 4 mg  Every 6 Hours PRN      12/04/20 0152    Unscheduled  Up With Assistance  As Needed      12/04/20 0152    Unscheduled  PICC LINE CARE - Change Dressing As Needed When Damp, Loose or Soiled  As Needed      12/07/20 1320    Unscheduled  PICC LINE  CARE - Change Needleless Connectors  As Needed     Comments: Per [facility] Policy    12/07/20 1320    Unscheduled  Remove PICC Cap for CT  As Needed      12/08/20 0955                   Respiratory Therapy (last 24 hours)      Respiratory Therapy Flowsheet     Row Name 12/08/20 1102 12/08/20 0900 12/08/20 0742 12/08/20 0400 12/08/20 0344       Oxygen Therapy    SpO2  99 %  --  98 %  --  97 %    Device (Oxygen Therapy)  room air  --  room air  room air  --       Vital Signs    Temp  98.1 °F (36.7 °C)  --  98 °F (36.7 °C)  --  98.2 °F (36.8 °C)    Temp src  Oral  --  Oral  --  Oral    Pulse  68  --  62  --  63    Heart Rate Source  Monitor  --  Monitor  --  Monitor    Resp  16  --  16  --  18    Resp Rate Source  Visual  --  Visual  --  Visual    BP  125/64  --  124/51  --  125/61    BP Location  Right arm  --  Right arm  --  Right arm    BP Method  Automatic  --  Automatic  --  Automatic    Patient Position  Lying  --  Lying  --  Lying       Assessment    Respiratory WDL  --  --  --  WDL  --    Cough Frequency  --  --  --  infrequent  --    Cough Type  --  --  --  nonproductive  --       Breath Sounds    Breath Sounds  --  --  --  All Fields  --    All Lung Fields Breath Sounds  --  --  --  clear  --    RLL Breath Sounds  --  --  --  equal bilaterally;clear  --       Treatment/Therapy    Cough And Deep Breathing  --  --  --  other (see comments) pt resting  --    Oral Care  --  teeth brushed  --  --  --    Row Name 12/08/20 0000 12/07/20 2350 12/07/20 2040 12/07/20 2000 12/07/20 1800       Oxygen Therapy    SpO2  --  99 %  99 %  --  --    Device (Oxygen Therapy)  room air  --  --  room air  --       Vital Signs    Temp  --  98 °F (36.7 °C)  98 °F (36.7 °C)  --  --    Temp src  --  Oral  Oral  --  --    Pulse  --  63  --  --  --    Heart Rate Source  --  Monitor  Monitor  --  --    Resp  --  18  16  --  --    Resp Rate Source  --  Visual  Visual  --  --    BP  --  127/71  128/55  --  --    BP Location  --  Right arm  Right  arm  --  --    BP Method  --  Automatic  Automatic  --  --    Patient Position  --  Lying  Lying  --  --       Assessment    Respiratory WDL  WDL  --  --  WDL  WDL    Cough Frequency  infrequent  --  --  infrequent  --    Cough Type  nonproductive  --  --  nonproductive  --       Breath Sounds    Breath Sounds  All Fields  --  --  All Fields  --    All Lung Fields Breath Sounds  clear  --  --  clear  --    RLL Breath Sounds  equal bilaterally;clear  --  --  Posterior:;Anterior:;equal bilaterally;clear  --       Treatment/Therapy    Cough And Deep Breathing  other (see comments) pt resting  --  --  done with encouragement  --       Care Plan Interventions    Supportive Measures  --  --  --  active listening utilized  --    Row Name 20 1600 20 1532                Oxygen Therapy    SpO2  --  98 %          Vital Signs    Temp  --  98.3 °F (36.8 °C)       Temp src  --  Oral       Pulse  --  58       Heart Rate Source  --  Monitor       Resp  --  16       Resp Rate Source  --  Visual       BP  --  113/58       Noninvasive MAP (mmHg)  --  71       BP Location  --  Right arm       BP Method  --  Automatic       Patient Position  --  Lying          Assessment    Respiratory WDL  WDL  --          Breath Sounds    Breath Sounds  All Fields  --       All Lung Fields Breath Sounds  clear  --           Operative/Procedure Notes (last 24 hours) (Notes from 20 1344 through 20 1344)    No notes of this type exist for this encounter.            Physician Progress Notes (last 24 hours) (Notes from 20 1344 through 20 1344)      Ravin Wong MD at 20 0902                [facility]    [facility]ESS [facility]    Name:  Stephen Diallo   Age:  45 y.o.  Sex:  male  :  1975  MRN:  [id number]   Visit Number:  [id number]  Admission Date:  12/3/2020  Date Of Service:  20  Primary Care Physician:  Provider, No Known     LOS: 5 days :  Patient Care Team:  Provider, No Known  as PCP - General  Provider, No Known as PCP - Family Medicine:      Subjective / Interval History:     45 M h/o IVDA, hepatitis C status post treatment, incarceration who has been having fungemia.  Patient was admitted and had signed out AMA has returned back.  Chart and events reviewed from the prior admit.  Patient has been started on micafungin since this admission Dr. Lerma did a EMILY and found vegetation on the tricuspid valve.  The blood culture showed 2 different organisms Mucor species and rhodotorula species  For the infectious disease pharmacist and liver will was consulted and patient has been started on amphotericin B liposome    Today on November eighth the patient has been seen and evaluated.  PICC line is to be placed today.  Patient is feeling slightly better  Vital Signs:    Temp:  [97.1 °F (36.2 °C)-98.3 °F (36.8 °C)] 98 °F (36.7 °C)  Heart Rate:  [53-63] 62  Resp:  [16-18] 16  BP: (103-128)/(51-73) 124/51    Intake and output:    I/O last 3 completed shifts:  In: 360 [P.O.:360]  Out: 3600 [Urine:3600]  No intake/output data recorded.    Physical Examination:    General Appearance:    Alert and cooperative, not in any acute distress.   Head:    Atraumatic and normocephalic, without obvious abnormality.   Eyes:            PERRLA,  No pallor. Extraocular movements are within normal limits.   Neck:   Supple,  No lymph glands, no bruit   Lungs:     Chest shape is normal. Breath sounds heard bilaterally equally.  No crackles or wheezing.     Heart:    Normal S1 and S2, no murmur,  No JVD   Abdomen:     Normal bowel sounds, no masses, no organomegaly. Soft     nontender, no guarding, no rebound tenderness   Extremities:   Moves all extremities well, no edema, no cyanosis,    Skin:   No  bruising or rash.   Neurologic:   Grossly nonfocal and moves all extremities.      Laboratory results:  Results from last 7 days   Lab Units 12/08/20  0653 12/06/20  0526 12/03/20  2300 12/02/20  2328   SODIUM mmol/L 141  139 140 141   POTASSIUM mmol/L 4.4 4.3 3.8 4.0   CHLORIDE mmol/L 109* 107 106 107   CO2 mmol/L 26.3 25.5 25.1 27.9   BUN mg/dL 17 19 16 16   CREATININE mg/dL 0.80 0.79 0.74* 0.76   CALCIUM mg/dL 9.2 9.0 8.9 9.2   BILIRUBIN mg/dL  --  0.7 0.8 0.8   ALK PHOS U/L  --  127* 123* 119*   ALT (SGPT) U/L  --  134* 160* 168*   AST (SGOT) U/L  --  132* 158* 147*   GLUCOSE mg/dL 122* 92 99 105*     Results from last 7 days   Lab Units 12/06/20  0526 12/03/20  2304 12/02/20  2328   WBC 10*3/mm3 6.32 6.53 7.89   HEMOGLOBIN g/dL 13.0 12.1* 13.1   HEMATOCRIT % 38.6 37.0* 40.0   PLATELETS 10*3/mm3 131* 133* 121*             Results from last 7 days   Lab Units 12/02/20  2333 12/02/20  2327   BLOODCX  No growth at 5 days No growth at 5 days       Radiology results:    Imaging Results (Last 24 Hours)     ** No results found for the last 24 hours. **          I have reviewed the patient's radiology reports.    Medication Review:     I have reviewed the patients active and prn medications.     Assessment:      Fungemia    Lactic acidosis    Polysubstance abuse (CMS/HCC)  Hepatitis C s/p treatment  IV drug abuser    Plan:    Fungemia -patient at present is being treated for IV antifungals micafungin this was on the blood culture which was done on the 30th.  He is growing 2 different species one is Mucor species and the second is rhodotorula speices   Based on the findings the micafungin has been DC'd and amphotericin B liposomal has been started.  Patient will need that at least for 4 weeks.    PICC line to be placed today and arrangements for placement to be started    Continue supportive care and rest of the medications as per orders    Ravin Wong MD  12/08/20  09:02 EST      Please note that portions of this note were completed with a voice recognition program.      Electronically signed by Ravin Wong MD at 12/08/20 0906       Consult Notes (last 24 hours) (Notes from 12/07/20 1344 through 12/08/20 1344)    No notes of  this type exist for this encounter.         Nutrition Notes (last 24 hours) (Notes from 12/07/20 1344 through 12/08/20 1344)    No notes exist for this encounter.         Physical Therapy Notes (last 24 hours) (Notes from 12/07/20 1344 through 12/08/20 1344)    No notes exist for this encounter.         Occupational Therapy Notes (last 24 hours) (Notes from 12/07/20 1344 through 12/08/20 1344)    No notes exist for this encounter.         Speech Language Pathology Notes (last 24 hours) (Notes from 12/07/20 1344 through 12/08/20 1344)    No notes exist for this encounter.         Respiratory Therapy Notes (last 24 hours) (Notes from 12/07/20 1344 through 12/08/20 1344)    No notes exist for this encounter.

## 2020-12-09 LAB
ANION GAP SERPL CALCULATED.3IONS-SCNC: 7.2 MMOL/L (ref 5–15)
BUN SERPL-MCNC: 26 MG/DL (ref 6–20)
BUN/CREAT SERPL: 22.6 (ref 7–25)
CALCIUM SPEC-SCNC: 9.2 MG/DL (ref 8.6–10.5)
CHLORIDE SERPL-SCNC: 109 MMOL/L (ref 98–107)
CO2 SERPL-SCNC: 21.8 MMOL/L (ref 22–29)
CREAT SERPL-MCNC: 1.15 MG/DL (ref 0.76–1.27)
GFR SERPL CREATININE-BSD FRML MDRD: 69 ML/MIN/1.73
GLUCOSE SERPL-MCNC: 98 MG/DL (ref 65–99)
MAGNESIUM SERPL-MCNC: 1.9 MG/DL (ref 1.6–2.6)
POTASSIUM SERPL-SCNC: 4.5 MMOL/L (ref 3.5–5.2)
SODIUM SERPL-SCNC: 138 MMOL/L (ref 136–145)

## 2020-12-09 PROCEDURE — 25010000002 ENOXAPARIN PER 10 MG: Performed by: EMERGENCY MEDICINE

## 2020-12-09 PROCEDURE — 80048 BASIC METABOLIC PNL TOTAL CA: CPT | Performed by: INTERNAL MEDICINE

## 2020-12-09 PROCEDURE — 63710000001 DIPHENHYDRAMINE PER 50 MG: Performed by: INTERNAL MEDICINE

## 2020-12-09 PROCEDURE — 99231 SBSQ HOSP IP/OBS SF/LOW 25: CPT | Performed by: INTERNAL MEDICINE

## 2020-12-09 PROCEDURE — 83735 ASSAY OF MAGNESIUM: CPT | Performed by: INTERNAL MEDICINE

## 2020-12-09 PROCEDURE — 25010000002 AMPHOTERICIN B LIPOSOME PER 10 MG: Performed by: INTERNAL MEDICINE

## 2020-12-09 RX ADMIN — HYDROCODONE BITARTRATE AND ACETAMINOPHEN 1 TABLET: 5; 325 TABLET ORAL at 22:50

## 2020-12-09 RX ADMIN — DIPHENHYDRAMINE HYDROCHLORIDE 50 MG: 25 CAPSULE ORAL at 15:38

## 2020-12-09 RX ADMIN — AMPHOTERICIN B 350 MG: 50 INJECTION, POWDER, LYOPHILIZED, FOR SOLUTION INTRAVENOUS at 16:39

## 2020-12-09 RX ADMIN — ENOXAPARIN SODIUM 40 MG: 40 INJECTION SUBCUTANEOUS at 03:49

## 2020-12-09 RX ADMIN — HYDROCODONE BITARTRATE AND ACETAMINOPHEN 1 TABLET: 5; 325 TABLET ORAL at 03:42

## 2020-12-09 RX ADMIN — CLONAZEPAM 0.5 MG: 0.5 TABLET ORAL at 15:38

## 2020-12-09 RX ADMIN — SPIRONOLACTONE 50 MG: 25 TABLET, FILM COATED ORAL at 09:32

## 2020-12-09 RX ADMIN — SODIUM CHLORIDE, PRESERVATIVE FREE 10 ML: 5 INJECTION INTRAVENOUS at 09:00

## 2020-12-09 RX ADMIN — SODIUM CHLORIDE, PRESERVATIVE FREE 10 ML: 5 INJECTION INTRAVENOUS at 23:13

## 2020-12-09 RX ADMIN — FUROSEMIDE 20 MG: 20 TABLET ORAL at 09:33

## 2020-12-09 RX ADMIN — ACETAMINOPHEN 650 MG: 325 TABLET, FILM COATED ORAL at 15:38

## 2020-12-09 RX ADMIN — HYDROCODONE BITARTRATE AND ACETAMINOPHEN 1 TABLET: 5; 325 TABLET ORAL at 09:33

## 2020-12-09 RX ADMIN — PROPRANOLOL HYDROCHLORIDE 20 MG: 20 TABLET ORAL at 22:50

## 2020-12-09 RX ADMIN — PROPRANOLOL HYDROCHLORIDE 20 MG: 20 TABLET ORAL at 09:32

## 2020-12-09 RX ADMIN — DEXTROSE MONOHYDRATE 10 ML: 50 INJECTION, SOLUTION INTRAVENOUS at 16:38

## 2020-12-09 RX ADMIN — HYDROCODONE BITARTRATE AND ACETAMINOPHEN 1 TABLET: 5; 325 TABLET ORAL at 15:38

## 2020-12-09 RX ADMIN — SODIUM CHLORIDE 500 ML: 9 INJECTION, SOLUTION INTRAVENOUS at 15:24

## 2020-12-09 RX ADMIN — Medication 1 PATCH: at 03:49

## 2020-12-09 NOTE — PROGRESS NOTES
[facility]    Name:  Stephen Diallo   Age:  45 y.o.  Sex:  male  :  1975  MRN:  [id number]   Visit Number:  [id number]  Admission Date:  12/3/2020  Date Of Service:  20  Primary Care Physician:  Provider, No Known     LOS: 6 days :  Patient Care Team:  Provider, No Known as PCP - General  Provider, No Known as PCP - Family Medicine:      Subjective / Interval History:     45 M h/o IVDA, hepatitis C status post treatment, incarceration who has been having fungemia.  Patient was admitted and had signed out AMA has returned back.  Chart and events reviewed from the prior admit.  Patient has been started on micafungin since this admission Dr. Lerma did a EMILY and found vegetation on the tricuspid valve.  The blood culture showed 2 different organisms Mucor species and rhodotorula species  For the infectious disease pharmacist and liver will was consulted and patient has been started on amphotericin B liposome    Patient has been seen and evaluated today on .  He did have some anxiety yesterday and did get Klonopin which has helped.  There is no fever chills or any other symptoms    Vital Signs:    Temp:  [97.4 °F (36.3 °C)-98.4 °F (36.9 °C)] 97.5 °F (36.4 °C)  Heart Rate:  [58-68] 63  Resp:  [16-18] 16  BP: ()/(56-68) 109/56    Intake and output:    I/O last 3 completed shifts:  In: 900 [P.O.:900]  Out: 3800 [Urine:3800]  I/O this shift:  In: -   Out: 600 [Urine:600]    Physical Examination:    General Appearance:    Alert and cooperative, not in any acute distress.   Head:    Atraumatic and normocephalic, without obvious abnormality.   Eyes:            PERRLA,  No pallor. Extraocular movements are within normal limits.   Neck:   Supple,  No lymph glands, no bruit   Lungs:     Chest shape is normal. Breath sounds heard bilaterally equally.  No crackles or wheezing.     Heart:    Normal S1 and S2, no murmur,  No JVD   Abdomen:     Normal bowel  sounds, no masses, no organomegaly. Soft     nontender, no guarding, no rebound tenderness   Extremities:   Moves all extremities well, no edema, no cyanosis,    Skin:   No  bruising or rash.   Neurologic:   Grossly nonfocal and moves all extremities.      Laboratory results:  Results from last 7 days   Lab Units 12/09/20  0604 12/08/20  0653 12/06/20  0526 12/03/20  2304 12/02/20  2328   SODIUM mmol/L 138 141 139 140 141   POTASSIUM mmol/L 4.5 4.4 4.3 3.8 4.0   CHLORIDE mmol/L 109* 109* 107 106 107   CO2 mmol/L 21.8* 26.3 25.5 25.1 27.9   BUN mg/dL 26* 17 19 16 16   CREATININE mg/dL 1.15 0.80 0.79 0.74* 0.76   CALCIUM mg/dL 9.2 9.2 9.0 8.9 9.2   BILIRUBIN mg/dL  --   --  0.7 0.8 0.8   ALK PHOS U/L  --   --  127* 123* 119*   ALT (SGPT) U/L  --   --  134* 160* 168*   AST (SGOT) U/L  --   --  132* 158* 147*   GLUCOSE mg/dL 98 122* 92 99 105*     Results from last 7 days   Lab Units 12/06/20  0526 12/03/20  2304 12/02/20  2328   WBC 10*3/mm3 6.32 6.53 7.89   HEMOGLOBIN g/dL 13.0 12.1* 13.1   HEMATOCRIT % 38.6 37.0* 40.0   PLATELETS 10*3/mm3 131* 133* 121*             Results from last 7 days   Lab Units 12/02/20  2333 12/02/20  2327   BLOODCX  No growth at 5 days No growth at 5 days       Radiology results:    Imaging Results (Last 24 Hours)     ** No results found for the last 24 hours. **          I have reviewed the patient's radiology reports.    Medication Review:     I have reviewed the patients active and prn medications.     Assessment:      Fungemia    Lactic acidosis    Polysubstance abuse (CMS/HCC)  Hepatitis C s/p treatment  IV drug abuser  Fungal endocarditis  Plan:    Fungemia -patient at present is being treated for IV antifungals micafungin this was on the blood culture which was done on the 30th.  He is growing 2 different species one is Mucor species and the second is rhodotorula speices   Based on the findings the micafungin has been DC'd and amphotericin B liposomal has been started.  Patient will  need that at least for 4 weeks.    Patient has had a PICC line and plan for placement is being worked on but difficult to get due to the cost of the medication.  Will consult and get opinion from the ID at  as no one else is available and see the recommendation if possible about changing to any less expensive medicine    Continue supportive care and rest of the medications as per orders    Ravin Wong MD  12/09/20  09:09 EST      Please note that portions of this note were completed with a voice recognition program.

## 2020-12-09 NOTE — PLAN OF CARE
Goal Outcome Evaluation:  Plan of Care Reviewed With: patient  Progress: no change  Outcome Summary: Patient slept most of the night. Was given pain meds 1x this shift. vitals have been stable and will continue to monitor. no overnight events to report. All medications taken and antibiotics given.

## 2020-12-10 PROBLEM — I33.0 FUNGAL ENDOCARDITIS: Status: ACTIVE | Noted: 2020-12-10

## 2020-12-10 PROBLEM — Z86.19 HEPATITIS C VIRUS INFECTION CURED AFTER ANTIVIRAL DRUG THERAPY: Status: ACTIVE | Noted: 2020-12-10

## 2020-12-10 LAB
ANION GAP SERPL CALCULATED.3IONS-SCNC: 10.4 MMOL/L (ref 5–15)
BUN SERPL-MCNC: 29 MG/DL (ref 6–20)
BUN/CREAT SERPL: 26.6 (ref 7–25)
CALCIUM SPEC-SCNC: 9.6 MG/DL (ref 8.6–10.5)
CHLORIDE SERPL-SCNC: 108 MMOL/L (ref 98–107)
CO2 SERPL-SCNC: 21.6 MMOL/L (ref 22–29)
CREAT SERPL-MCNC: 1.09 MG/DL (ref 0.76–1.27)
GFR SERPL CREATININE-BSD FRML MDRD: 73 ML/MIN/1.73
GLUCOSE SERPL-MCNC: 88 MG/DL (ref 65–99)
MAGNESIUM SERPL-MCNC: 2 MG/DL (ref 1.6–2.6)
POTASSIUM SERPL-SCNC: 4.2 MMOL/L (ref 3.5–5.2)
SODIUM SERPL-SCNC: 140 MMOL/L (ref 136–145)

## 2020-12-10 PROCEDURE — 80048 BASIC METABOLIC PNL TOTAL CA: CPT | Performed by: INTERNAL MEDICINE

## 2020-12-10 PROCEDURE — 25010000002 ENOXAPARIN PER 10 MG: Performed by: EMERGENCY MEDICINE

## 2020-12-10 PROCEDURE — 63710000001 DIPHENHYDRAMINE PER 50 MG: Performed by: INTERNAL MEDICINE

## 2020-12-10 PROCEDURE — 99232 SBSQ HOSP IP/OBS MODERATE 35: CPT | Performed by: INTERNAL MEDICINE

## 2020-12-10 PROCEDURE — 25010000002 AMPHOTERICIN B LIPOSOME PER 10 MG: Performed by: INTERNAL MEDICINE

## 2020-12-10 PROCEDURE — 83735 ASSAY OF MAGNESIUM: CPT | Performed by: INTERNAL MEDICINE

## 2020-12-10 RX ORDER — CLONIDINE HYDROCHLORIDE 0.1 MG/1
0.1 TABLET ORAL EVERY 12 HOURS SCHEDULED
Status: DISCONTINUED | OUTPATIENT
Start: 2020-12-10 | End: 2020-12-12

## 2020-12-10 RX ORDER — CLONAZEPAM 0.5 MG/1
0.5 TABLET ORAL 3 TIMES DAILY
Status: DISCONTINUED | OUTPATIENT
Start: 2020-12-10 | End: 2020-12-15 | Stop reason: HOSPADM

## 2020-12-10 RX ADMIN — SPIRONOLACTONE 50 MG: 25 TABLET, FILM COATED ORAL at 08:41

## 2020-12-10 RX ADMIN — CLONAZEPAM 0.5 MG: 0.5 TABLET ORAL at 21:55

## 2020-12-10 RX ADMIN — CLONAZEPAM 0.5 MG: 0.5 TABLET ORAL at 17:08

## 2020-12-10 RX ADMIN — SODIUM CHLORIDE, PRESERVATIVE FREE 10 ML: 5 INJECTION INTRAVENOUS at 08:49

## 2020-12-10 RX ADMIN — SODIUM CHLORIDE, PRESERVATIVE FREE 10 ML: 5 INJECTION INTRAVENOUS at 08:48

## 2020-12-10 RX ADMIN — SODIUM CHLORIDE, PRESERVATIVE FREE 10 ML: 5 INJECTION INTRAVENOUS at 21:59

## 2020-12-10 RX ADMIN — AMPHOTERICIN B 350 MG: 50 INJECTION, POWDER, LYOPHILIZED, FOR SOLUTION INTRAVENOUS at 17:47

## 2020-12-10 RX ADMIN — ACETAMINOPHEN 650 MG: 325 TABLET, FILM COATED ORAL at 17:30

## 2020-12-10 RX ADMIN — SODIUM CHLORIDE, PRESERVATIVE FREE 10 ML: 5 INJECTION INTRAVENOUS at 21:57

## 2020-12-10 RX ADMIN — PROPRANOLOL HYDROCHLORIDE 20 MG: 20 TABLET ORAL at 08:41

## 2020-12-10 RX ADMIN — SODIUM CHLORIDE 500 ML: 9 INJECTION, SOLUTION INTRAVENOUS at 17:26

## 2020-12-10 RX ADMIN — Medication 1 PATCH: at 05:06

## 2020-12-10 RX ADMIN — SODIUM CHLORIDE, PRESERVATIVE FREE 10 ML: 5 INJECTION INTRAVENOUS at 21:58

## 2020-12-10 RX ADMIN — HYDROCODONE BITARTRATE AND ACETAMINOPHEN 1 TABLET: 5; 325 TABLET ORAL at 12:23

## 2020-12-10 RX ADMIN — DEXTROSE MONOHYDRATE 250 ML: 50 INJECTION, SOLUTION INTRAVENOUS at 17:12

## 2020-12-10 RX ADMIN — FUROSEMIDE 20 MG: 20 TABLET ORAL at 08:41

## 2020-12-10 RX ADMIN — CLONAZEPAM 0.5 MG: 0.5 TABLET ORAL at 05:07

## 2020-12-10 RX ADMIN — PROPRANOLOL HYDROCHLORIDE 20 MG: 20 TABLET ORAL at 21:54

## 2020-12-10 RX ADMIN — HYDROCODONE BITARTRATE AND ACETAMINOPHEN 1 TABLET: 5; 325 TABLET ORAL at 21:54

## 2020-12-10 RX ADMIN — DIPHENHYDRAMINE HYDROCHLORIDE 50 MG: 25 CAPSULE ORAL at 17:23

## 2020-12-10 RX ADMIN — TRAMADOL HYDROCHLORIDE 50 MG: 50 TABLET, FILM COATED ORAL at 08:47

## 2020-12-10 RX ADMIN — HYDROCODONE BITARTRATE AND ACETAMINOPHEN 1 TABLET: 5; 325 TABLET ORAL at 05:07

## 2020-12-10 RX ADMIN — CLONIDINE HYDROCHLORIDE 0.1 MG: 0.1 TABLET ORAL at 17:54

## 2020-12-10 RX ADMIN — ENOXAPARIN SODIUM 40 MG: 40 INJECTION SUBCUTANEOUS at 05:06

## 2020-12-10 NOTE — PROGRESS NOTES
[facility]    Name:  Stephen Diallo   Age:  45 y.o.  Sex:  male  :  1975  MRN:  [id number]   Visit Number:  [id number]  Admission Date:  12/3/2020  Date Of Service:  12/10/20  Primary Care Physician:  Provider, No Known     LOS: 7 days :  Patient Care Team:  Provider, No Known as PCP - General  Provider, No Known as PCP - Family Medicine:      Subjective / Interval History:     45 M h/o IVDA, hepatitis C status post treatment, incarceration who has been having fungemia.  Patient was admitted and had signed out AMA has returned back.  Chart and events reviewed from the prior admit.  Patient has been started on micafungin since this admission Dr. Lerma did a EMILY and found vegetation on the tricuspid valve.  The blood culture showed 2 different organisms Mucor species and rhodotorula species.   the infectious disease pharmacist  was consulted and patient has been started on amphotericin B liposome  Since the patient has the vegetation and 2 different kinds of species of fungal infection I consulted the ID at [facility].  ID specialist have recommended at least 6 weeks of IV antibiotic and cardiothoracic surgeon for evaluation.  Since the services are not available here patient has been accepted and will be transferred to [facility] pending bed availability.  On the  patient has been accepted but waiting for the bed availability.    Today on November 10 patient has been seen and evaluated.  He has been doing well.  He has not had any fever or chills.  Waiting for the bed for him to be transferred to     Vital Signs:    Temp:  [97.6 °F (36.4 °C)-98.5 °F (36.9 °C)] 98.3 °F (36.8 °C)  Heart Rate:  [63-88] 66  Resp:  [16] 16  BP: (105-117)/(48-59) 111/56    Intake and output:    I/O last 3 completed shifts:  In: 1710 [P.O.:960; I.V.:250; IV Piggyback:500]  Out: 2750 [Urine:2750]  I/O this shift:  In: 480 [P.O.:480]  Out: 500 [Urine:500]    Physical  Examination:    General Appearance:    Alert and cooperative, not in any acute distress.   Head:    Atraumatic and normocephalic, without obvious abnormality.   Eyes:            PERRLA,  No pallor. Extraocular movements are within normal limits.   Neck:   Supple,  No lymph glands, no bruit   Lungs:     Chest shape is normal. Breath sounds heard bilaterally equally.  No crackles or wheezing.     Heart:    Normal S1 and S2, no murmur,  No JVD   Abdomen:     Normal bowel sounds, no masses, no organomegaly. Soft     nontender, no guarding, no rebound tenderness   Extremities:   Moves all extremities well, no edema, no cyanosis,    Skin:   No  bruising or rash.   Neurologic:   Grossly nonfocal and moves all extremities.      Laboratory results:  Results from last 7 days   Lab Units 12/10/20  0546 12/09/20  0604 12/08/20  0653 12/06/20  0526 12/03/20  2304   SODIUM mmol/L 140 138 141 139 140   POTASSIUM mmol/L 4.2 4.5 4.4 4.3 3.8   CHLORIDE mmol/L 108* 109* 109* 107 106   CO2 mmol/L 21.6* 21.8* 26.3 25.5 25.1   BUN mg/dL 29* 26* 17 19 16   CREATININE mg/dL 1.09 1.15 0.80 0.79 0.74*   CALCIUM mg/dL 9.6 9.2 9.2 9.0 8.9   BILIRUBIN mg/dL  --   --   --  0.7 0.8   ALK PHOS U/L  --   --   --  127* 123*   ALT (SGPT) U/L  --   --   --  134* 160*   AST (SGOT) U/L  --   --   --  132* 158*   GLUCOSE mg/dL 88 98 122* 92 99     Results from last 7 days   Lab Units 12/06/20  0526 12/03/20  2304   WBC 10*3/mm3 6.32 6.53   HEMOGLOBIN g/dL 13.0 12.1*   HEMATOCRIT % 38.6 37.0*   PLATELETS 10*3/mm3 131* 133*                   Radiology results:    Imaging Results (Last 24 Hours)     ** No results found for the last 24 hours. **          I have reviewed the patient's radiology reports.    Medication Review:     I have reviewed the patients active and prn medications.     Assessment:      Fungemia    Lactic acidosis    Polysubstance abuse (CMS/HCC)  Hepatitis C s/p treatment  IV drug abuser  Fungal endocarditis  Plan:    Fungemia -based on  the blood cultures initially patient was started on IV if micafungin..  He is growing 2 different species one is Mucor species and the second is rhodotorula speices   Based on the findings the micafungin has been DC'd and amphotericin B liposomal has been started.  Patient will need that at least for 6  weeks.  I had consultation with the [facility] ID and they recommended 6 weeks of antibiotic and cardiothoracic surgeon evaluation due to  valve vegetation.    [facility] has been accepted by [facility] but pending bed availability we will continue with current management    Patient has been explained the goals of care and is agreeable with the above    Ravin Wong MD  12/10/20  08:57 EST      Please note that portions of this note were completed with a voice recognition program.

## 2020-12-10 NOTE — PROGRESS NOTES
Continued Stay Note   Jaquez     Patient Name: Stephen Diallo  MRN: [id number]  Today's Date: 12/10/2020    Admit Date: 12/3/2020    Discharge Plan     Row Name 12/10/20 0943       Plan    Plan  Discharge planning, patient's plan is to transfer to [facility].        Discharge Codes    No documentation.       Expected Discharge Date and Time     Expected Discharge Date Expected Discharge Time    Dec 11, 2020             Griselda Oneill LCSW

## 2020-12-10 NOTE — PLAN OF CARE
Goal Outcome Evaluation:  Plan of Care Reviewed With: patient  Progress: no change  Outcome Summary: COWS scored 7-8 all day. Clonidine added see MAR. Still waiting for a bed at . Patient aware with plan.

## 2020-12-11 LAB
AMPHET+METHAMPHET UR QL: POSITIVE
AMPHETAMINES UR QL: POSITIVE
ANION GAP SERPL CALCULATED.3IONS-SCNC: 10.5 MMOL/L (ref 5–15)
BARBITURATES UR QL SCN: NEGATIVE
BENZODIAZ UR QL SCN: NEGATIVE
BUN SERPL-MCNC: 40 MG/DL (ref 6–20)
BUN/CREAT SERPL: 34.5 (ref 7–25)
BUPRENORPHINE SERPL-MCNC: NEGATIVE NG/ML
CALCIUM SPEC-SCNC: 9.8 MG/DL (ref 8.6–10.5)
CANNABINOIDS SERPL QL: NEGATIVE
CHLORIDE SERPL-SCNC: 107 MMOL/L (ref 98–107)
CO2 SERPL-SCNC: 22.5 MMOL/L (ref 22–29)
COCAINE UR QL: NEGATIVE
CREAT SERPL-MCNC: 1.16 MG/DL (ref 0.76–1.27)
GFR SERPL CREATININE-BSD FRML MDRD: 68 ML/MIN/1.73
GLUCOSE SERPL-MCNC: 112 MG/DL (ref 65–99)
MAGNESIUM SERPL-MCNC: 2 MG/DL (ref 1.6–2.6)
METHADONE UR QL SCN: NEGATIVE
OPIATES UR QL: POSITIVE
OXYCODONE UR QL SCN: NEGATIVE
PCP UR QL SCN: NEGATIVE
POTASSIUM SERPL-SCNC: 4.4 MMOL/L (ref 3.5–5.2)
PROPOXYPH UR QL: NEGATIVE
SODIUM SERPL-SCNC: 140 MMOL/L (ref 136–145)
TRICYCLICS UR QL SCN: NEGATIVE

## 2020-12-11 PROCEDURE — 25010000002 AMPHOTERICIN B LIPOSOME PER 10 MG: Performed by: INTERNAL MEDICINE

## 2020-12-11 PROCEDURE — 25010000002 ENOXAPARIN PER 10 MG: Performed by: EMERGENCY MEDICINE

## 2020-12-11 PROCEDURE — 83735 ASSAY OF MAGNESIUM: CPT | Performed by: INTERNAL MEDICINE

## 2020-12-11 PROCEDURE — 80048 BASIC METABOLIC PNL TOTAL CA: CPT | Performed by: INTERNAL MEDICINE

## 2020-12-11 PROCEDURE — 99232 SBSQ HOSP IP/OBS MODERATE 35: CPT | Performed by: INTERNAL MEDICINE

## 2020-12-11 PROCEDURE — 63710000001 DIPHENHYDRAMINE PER 50 MG: Performed by: INTERNAL MEDICINE

## 2020-12-11 PROCEDURE — 80306 DRUG TEST PRSMV INSTRMNT: CPT | Performed by: INTERNAL MEDICINE

## 2020-12-11 RX ADMIN — CLONIDINE HYDROCHLORIDE 0.1 MG: 0.1 TABLET ORAL at 20:02

## 2020-12-11 RX ADMIN — FUROSEMIDE 20 MG: 20 TABLET ORAL at 09:27

## 2020-12-11 RX ADMIN — SODIUM CHLORIDE, PRESERVATIVE FREE 10 ML: 5 INJECTION INTRAVENOUS at 11:28

## 2020-12-11 RX ADMIN — PROPRANOLOL HYDROCHLORIDE 20 MG: 20 TABLET ORAL at 09:27

## 2020-12-11 RX ADMIN — ACETAMINOPHEN 650 MG: 325 TABLET, FILM COATED ORAL at 15:24

## 2020-12-11 RX ADMIN — CLONAZEPAM 0.5 MG: 0.5 TABLET ORAL at 16:19

## 2020-12-11 RX ADMIN — PROPRANOLOL HYDROCHLORIDE 20 MG: 20 TABLET ORAL at 20:02

## 2020-12-11 RX ADMIN — CLONAZEPAM 0.5 MG: 0.5 TABLET ORAL at 09:28

## 2020-12-11 RX ADMIN — CLONAZEPAM 0.5 MG: 0.5 TABLET ORAL at 20:02

## 2020-12-11 RX ADMIN — SODIUM CHLORIDE, PRESERVATIVE FREE 10 ML: 5 INJECTION INTRAVENOUS at 11:29

## 2020-12-11 RX ADMIN — AMPHOTERICIN B 350 MG: 50 INJECTION, POWDER, LYOPHILIZED, FOR SOLUTION INTRAVENOUS at 16:08

## 2020-12-11 RX ADMIN — Medication 1 PATCH: at 02:08

## 2020-12-11 RX ADMIN — DEXTROSE MONOHYDRATE 250 ML: 50 INJECTION, SOLUTION INTRAVENOUS at 16:09

## 2020-12-11 RX ADMIN — HYDROCODONE BITARTRATE AND ACETAMINOPHEN 1 TABLET: 5; 325 TABLET ORAL at 04:58

## 2020-12-11 RX ADMIN — SODIUM CHLORIDE, PRESERVATIVE FREE 10 ML: 5 INJECTION INTRAVENOUS at 20:09

## 2020-12-11 RX ADMIN — DIPHENHYDRAMINE HYDROCHLORIDE 50 MG: 25 CAPSULE ORAL at 15:24

## 2020-12-11 RX ADMIN — SODIUM CHLORIDE, PRESERVATIVE FREE 10 ML: 5 INJECTION INTRAVENOUS at 11:30

## 2020-12-11 RX ADMIN — SPIRONOLACTONE 50 MG: 25 TABLET, FILM COATED ORAL at 09:27

## 2020-12-11 RX ADMIN — ENOXAPARIN SODIUM 40 MG: 40 INJECTION SUBCUTANEOUS at 02:08

## 2020-12-11 RX ADMIN — DEXTROSE MONOHYDRATE 250 ML: 50 INJECTION, SOLUTION INTRAVENOUS at 14:46

## 2020-12-11 RX ADMIN — CLONIDINE HYDROCHLORIDE 0.1 MG: 0.1 TABLET ORAL at 09:27

## 2020-12-11 NOTE — PLAN OF CARE
Goal Outcome Evaluation:  Plan of Care Reviewed With: patient  Progress: no change  Outcome Summary: COWS symptoms mildly improved from yesterday. Per MDs order no visitors allowed at this time. Still waiting for a bed to open up at . Will transfer as soon as one is available.

## 2020-12-11 NOTE — PLAN OF CARE
Goal Outcome Evaluation:  Plan of Care Reviewed With: patient  Progress: no change     No issues overnight. Awaiting bed availability at  for transfer. Klonopin prn for COWS score. PICC in place for long term atbx

## 2020-12-11 NOTE — PROGRESS NOTES
[facility]    Name:  Stephen Diallo   Age:  45 y.o.  Sex:  male  :  1975  MRN:  [id number]   Visit Number:  [id number]  Admission Date:  12/3/2020  Date Of Service:  20  Primary Care Physician:  Provider, No Known     LOS: 8 days :  Patient Care Team:  Provider, No Known as PCP - General  Provider, No Known as PCP - Family Medicine:      Subjective / Interval History:     45 M h/o IVDA, hepatitis C status post treatment, incarceration who has been having fungemia.  Patient was admitted and had signed out AMA has returned back.  Chart and events reviewed from the prior admit.  Patient has been started on micafungin since this admission Dr. Lerma did a EMILY and found vegetation on the tricuspid valve.  The blood culture showed 2 different organisms Mucor species and rhodotorula species.   the infectious disease pharmacist  was consulted and patient has been started on amphotericin B liposome  Since the patient has the vegetation and 2 different kinds of species of fungal infection I consulted the ID at [facility].  ID specialist have recommended at least 6 weeks of IV antibiotic and cardiothoracic surgeon for evaluation.  Since the services are not available here patient has been accepted and will be transferred to [facility] pending bed availability.  On the  patient has been accepted but waiting for the bed availability.    Today on  patient has been seen and evaluated.  He has been having some diaphoresis and signs of withdrawal so yesterday clonidine and Klonopin doses were added and adjusted.  He is feeling a little better no tremors at present    Vital Signs:    Temp:  [98 °F (36.7 °C)-98.4 °F (36.9 °C)] 98 °F (36.7 °C)  Heart Rate:  [64-73] 73  Resp:  [16] 16  BP: (106-117)/(47-56) 108/56    Intake and output:    I/O last 3 completed shifts:  In:  [P.O.:1540; I.V.:20; IV Piggyback:200]  Out:  [Urine:]  No  intake/output data recorded.    Physical Examination:    General Appearance:    Alert and cooperative, not in any acute distress.   Head:    Atraumatic and normocephalic, without obvious abnormality.   Eyes:            PERRLA,  No pallor. Extraocular movements are within normal limits.   Neck:   Supple,  No lymph glands, no bruit   Lungs:     Chest shape is normal. Breath sounds heard bilaterally equally.  No crackles or wheezing.     Heart:    Normal S1 and S2, no murmur,  No JVD   Abdomen:     Normal bowel sounds, no masses, no organomegaly. Soft     nontender, no guarding, no rebound tenderness   Extremities:   Moves all extremities well, no edema, no cyanosis,    Skin:   No  bruising or rash.   Neurologic:   Grossly nonfocal and moves all extremities.      Laboratory results:  Results from last 7 days   Lab Units 12/11/20  0534 12/10/20  0546 12/09/20  0604  12/06/20  0526   SODIUM mmol/L 140 140 138   < > 139   POTASSIUM mmol/L 4.4 4.2 4.5   < > 4.3   CHLORIDE mmol/L 107 108* 109*   < > 107   CO2 mmol/L 22.5 21.6* 21.8*   < > 25.5   BUN mg/dL 40* 29* 26*   < > 19   CREATININE mg/dL 1.16 1.09 1.15   < > 0.79   CALCIUM mg/dL 9.8 9.6 9.2   < > 9.0   BILIRUBIN mg/dL  --   --   --   --  0.7   ALK PHOS U/L  --   --   --   --  127*   ALT (SGPT) U/L  --   --   --   --  134*   AST (SGOT) U/L  --   --   --   --  132*   GLUCOSE mg/dL 112* 88 98   < > 92    < > = values in this interval not displayed.     Results from last 7 days   Lab Units 12/06/20  0526   WBC 10*3/mm3 6.32   HEMOGLOBIN g/dL 13.0   HEMATOCRIT % 38.6   PLATELETS 10*3/mm3 131*                   Radiology results:    Imaging Results (Last 24 Hours)     ** No results found for the last 24 hours. **          I have reviewed the patient's radiology reports.    Medication Review:     I have reviewed the patients active and prn medications.     Assessment:      Fungemia    Fungal endocarditis    Lactic acidosis    Polysubstance abuse (CMS/HCC)    Hepatitis C  virus infection cured after antiviral drug therapy  Hepatitis C s/p treatment  IV drug abuser    Plan:    Fungemia -based on the blood cultures initially patient was started on IV if micafungin..  He is growing 2 different species one is Mucor species and the second is rhodotorula speices   Based on the findings the micafungin has been DC'd and amphotericin B liposomal has been started.  Patient will need that at least for 6  weeks.  I had consultation with the [facility] ID and they recommended 6 weeks of antibiotic and cardiothoracic surgeon evaluation due to the stress cuspid valve vegetation.    Drug withdrawal-patient has been on heroine and meth and has been a chronic user and it has been December 1 is the last use.  Have added clonidine and Klonopin and his symptomatology is controlled    [facility] has been accepted by [facility] but pending bed availability we will continue with current management    Patient has been explained the goals of care and is agreeable with the above    Ravin Wong MD  12/11/20  08:32 EST      Please note that portions of this note were completed with a voice recognition program.

## 2020-12-12 LAB
ANION GAP SERPL CALCULATED.3IONS-SCNC: 8.6 MMOL/L (ref 5–15)
BASOPHILS # BLD AUTO: 0.03 10*3/MM3 (ref 0–0.2)
BASOPHILS NFR BLD AUTO: 0.4 % (ref 0–1.5)
BUN SERPL-MCNC: 40 MG/DL (ref 6–20)
BUN/CREAT SERPL: 30.1 (ref 7–25)
CALCIUM SPEC-SCNC: 9.5 MG/DL (ref 8.6–10.5)
CHLORIDE SERPL-SCNC: 109 MMOL/L (ref 98–107)
CO2 SERPL-SCNC: 20.4 MMOL/L (ref 22–29)
CREAT SERPL-MCNC: 1.33 MG/DL (ref 0.76–1.27)
DEPRECATED RDW RBC AUTO: 47.6 FL (ref 37–54)
EOSINOPHIL # BLD AUTO: 0.07 10*3/MM3 (ref 0–0.4)
EOSINOPHIL NFR BLD AUTO: 0.8 % (ref 0.3–6.2)
ERYTHROCYTE [DISTWIDTH] IN BLOOD BY AUTOMATED COUNT: 14.7 % (ref 12.3–15.4)
GFR SERPL CREATININE-BSD FRML MDRD: 58 ML/MIN/1.73
GLUCOSE SERPL-MCNC: 124 MG/DL (ref 65–99)
HCT VFR BLD AUTO: 36.7 % (ref 37.5–51)
HGB BLD-MCNC: 12.5 G/DL (ref 13–17.7)
IMM GRANULOCYTES # BLD AUTO: 0.04 10*3/MM3 (ref 0–0.05)
IMM GRANULOCYTES NFR BLD AUTO: 0.5 % (ref 0–0.5)
LYMPHOCYTES # BLD AUTO: 1.08 10*3/MM3 (ref 0.7–3.1)
LYMPHOCYTES NFR BLD AUTO: 12.8 % (ref 19.6–45.3)
MAGNESIUM SERPL-MCNC: 2.1 MG/DL (ref 1.6–2.6)
MCH RBC QN AUTO: 30.4 PG (ref 26.6–33)
MCHC RBC AUTO-ENTMCNC: 34.1 G/DL (ref 31.5–35.7)
MCV RBC AUTO: 89.3 FL (ref 79–97)
MONOCYTES # BLD AUTO: 0.54 10*3/MM3 (ref 0.1–0.9)
MONOCYTES NFR BLD AUTO: 6.4 % (ref 5–12)
NEUTROPHILS NFR BLD AUTO: 6.68 10*3/MM3 (ref 1.7–7)
NEUTROPHILS NFR BLD AUTO: 79.1 % (ref 42.7–76)
NRBC BLD AUTO-RTO: 0 /100 WBC (ref 0–0.2)
PLATELET # BLD AUTO: 127 10*3/MM3 (ref 140–450)
PMV BLD AUTO: 11.1 FL (ref 6–12)
POTASSIUM SERPL-SCNC: 4.9 MMOL/L (ref 3.5–5.2)
RBC # BLD AUTO: 4.11 10*6/MM3 (ref 4.14–5.8)
RBC MORPH BLD: NORMAL
SMALL PLATELETS BLD QL SMEAR: ADEQUATE
SODIUM SERPL-SCNC: 138 MMOL/L (ref 136–145)
WBC # BLD AUTO: 8.44 10*3/MM3 (ref 3.4–10.8)
WBC MORPH BLD: NORMAL

## 2020-12-12 PROCEDURE — 85025 COMPLETE CBC W/AUTO DIFF WBC: CPT | Performed by: INTERNAL MEDICINE

## 2020-12-12 PROCEDURE — 25010000002 ENOXAPARIN PER 10 MG: Performed by: EMERGENCY MEDICINE

## 2020-12-12 PROCEDURE — 83735 ASSAY OF MAGNESIUM: CPT | Performed by: INTERNAL MEDICINE

## 2020-12-12 PROCEDURE — 63710000001 DIPHENHYDRAMINE PER 50 MG: Performed by: INTERNAL MEDICINE

## 2020-12-12 PROCEDURE — 99231 SBSQ HOSP IP/OBS SF/LOW 25: CPT | Performed by: INTERNAL MEDICINE

## 2020-12-12 PROCEDURE — 80048 BASIC METABOLIC PNL TOTAL CA: CPT | Performed by: INTERNAL MEDICINE

## 2020-12-12 PROCEDURE — 85007 BL SMEAR W/DIFF WBC COUNT: CPT | Performed by: INTERNAL MEDICINE

## 2020-12-12 PROCEDURE — 25010000002 AMPHOTERICIN B LIPOSOME PER 10 MG: Performed by: INTERNAL MEDICINE

## 2020-12-12 RX ADMIN — CLONAZEPAM 0.5 MG: 0.5 TABLET ORAL at 15:43

## 2020-12-12 RX ADMIN — SODIUM CHLORIDE, PRESERVATIVE FREE 10 ML: 5 INJECTION INTRAVENOUS at 08:34

## 2020-12-12 RX ADMIN — SODIUM CHLORIDE, PRESERVATIVE FREE 10 ML: 5 INJECTION INTRAVENOUS at 08:35

## 2020-12-12 RX ADMIN — CLONAZEPAM 0.5 MG: 0.5 TABLET ORAL at 08:29

## 2020-12-12 RX ADMIN — CLONAZEPAM 0.5 MG: 0.5 TABLET ORAL at 21:24

## 2020-12-12 RX ADMIN — SPIRONOLACTONE 50 MG: 25 TABLET, FILM COATED ORAL at 08:28

## 2020-12-12 RX ADMIN — SODIUM CHLORIDE, PRESERVATIVE FREE 10 ML: 5 INJECTION INTRAVENOUS at 21:25

## 2020-12-12 RX ADMIN — Medication 1 PATCH: at 02:10

## 2020-12-12 RX ADMIN — PROPRANOLOL HYDROCHLORIDE 20 MG: 20 TABLET ORAL at 08:29

## 2020-12-12 RX ADMIN — AMPHOTERICIN B 350 MG: 50 INJECTION, POWDER, LYOPHILIZED, FOR SOLUTION INTRAVENOUS at 15:50

## 2020-12-12 RX ADMIN — SODIUM CHLORIDE, PRESERVATIVE FREE 10 ML: 5 INJECTION INTRAVENOUS at 21:24

## 2020-12-12 RX ADMIN — ACETAMINOPHEN 650 MG: 325 TABLET, FILM COATED ORAL at 15:43

## 2020-12-12 RX ADMIN — PROPRANOLOL HYDROCHLORIDE 20 MG: 20 TABLET ORAL at 21:24

## 2020-12-12 RX ADMIN — ENOXAPARIN SODIUM 40 MG: 40 INJECTION SUBCUTANEOUS at 02:09

## 2020-12-12 RX ADMIN — DEXTROSE MONOHYDRATE 250 ML: 50 INJECTION, SOLUTION INTRAVENOUS at 15:44

## 2020-12-12 RX ADMIN — DEXTROSE MONOHYDRATE 10 ML: 50 INJECTION, SOLUTION INTRAVENOUS at 15:49

## 2020-12-12 RX ADMIN — FUROSEMIDE 20 MG: 20 TABLET ORAL at 08:30

## 2020-12-12 RX ADMIN — DIPHENHYDRAMINE HYDROCHLORIDE 50 MG: 25 CAPSULE ORAL at 15:43

## 2020-12-12 NOTE — PROGRESS NOTES
[facility]    Name:  Stephen Diallo   Age:  45 y.o.  Sex:  male  :  1975  MRN:  [id number]   Visit Number:  [id number]  Admission Date:  12/3/2020  Date Of Service:  20  Primary Care Physician:  Provider, No Known     LOS: 9 days :  Patient Care Team:  Provider, No Known as PCP - General  Provider, No Known as PCP - Family Medicine:      Subjective / Interval History:     45 M h/o IVDA, hepatitis C status post treatment, incarceration who has been having fungemia.  Patient was admitted and had signed out AMA has returned back.  Chart and events reviewed from the prior admit.  Patient has been started on micafungin since this admission Dr. Lerma did a EMILY and found vegetation on the tricuspid valve.  The blood culture showed 2 different organisms Mucor species and rhodotorula species.   the infectious disease pharmacist  was consulted and patient has been started on amphotericin B liposome  Since the patient has the vegetation and 2 different kinds of species of fungal infection I consulted the ID at [facility].  ID specialist have recommended at least 6 weeks of IV antibiotic and cardiothoracic surgeon for evaluation.  Since the services are not available here patient has been accepted and will be transferred to [facility] pending bed availability.  On the  patient has been accepted but waiting for the bed availability.    Today on  the patient has been seen and evaluated.  Patient is been having slightly low pressure since  clonidine was started.  He also had visit by the family and as per the nurse possibility he got some drugs from the family member yesterday.  Urine tox screen was done and it did confirm and show methamphetamines  Vital Signs:    Temp:  [97.5 °F (36.4 °C)-98.4 °F (36.9 °C)] 98.1 °F (36.7 °C)  Heart Rate:  [66-79] 74  Resp:  [15-18] 18  BP: ()/(41-54) 96/43    Intake and output:    I/O last 3 completed  shifts:  In: 2850 [P.O.:2400; I.V.:50; IV Piggyback:400]  Out: 2020 [Urine:2020]  No intake/output data recorded.    Physical Examination:    General Appearance:    Alert and cooperative, not in any acute distress.   Head:    Atraumatic and normocephalic, without obvious abnormality.   Eyes:            PERRLA,  No pallor. Extraocular movements are within normal limits.   Neck:   Supple,  No lymph glands, no bruit   Lungs:     Chest shape is normal. Breath sounds heard bilaterally equally.  No crackles or wheezing.     Heart:    Normal S1 and S2, no murmur,  No JVD   Abdomen:     Normal bowel sounds, no masses, no organomegaly. Soft     nontender, no guarding, no rebound tenderness   Extremities:   Moves all extremities well, no edema, no cyanosis,    Skin:   No  bruising or rash.   Neurologic:   Grossly nonfocal and moves all extremities.      Laboratory results:  Results from last 7 days   Lab Units 12/12/20  0542 12/11/20  0534 12/10/20  0546  12/06/20  0526   SODIUM mmol/L 138 140 140   < > 139   POTASSIUM mmol/L 4.9 4.4 4.2   < > 4.3   CHLORIDE mmol/L 109* 107 108*   < > 107   CO2 mmol/L 20.4* 22.5 21.6*   < > 25.5   BUN mg/dL 40* 40* 29*   < > 19   CREATININE mg/dL 1.33* 1.16 1.09   < > 0.79   CALCIUM mg/dL 9.5 9.8 9.6   < > 9.0   BILIRUBIN mg/dL  --   --   --   --  0.7   ALK PHOS U/L  --   --   --   --  127*   ALT (SGPT) U/L  --   --   --   --  134*   AST (SGOT) U/L  --   --   --   --  132*   GLUCOSE mg/dL 124* 112* 88   < > 92    < > = values in this interval not displayed.     Results from last 7 days   Lab Units 12/12/20  0542 12/06/20  0526   WBC 10*3/mm3 8.44 6.32   HEMOGLOBIN g/dL 12.5* 13.0   HEMATOCRIT % 36.7* 38.6   PLATELETS 10*3/mm3 127* 131*                   Radiology results:    Imaging Results (Last 24 Hours)     ** No results found for the last 24 hours. **          I have reviewed the patient's radiology reports.    Medication Review:     I have reviewed the patients active and prn  medications.     Assessment:      Fungemia    Fungal endocarditis    Lactic acidosis    Polysubstance abuse (CMS/HCC)    Hepatitis C virus infection cured after antiviral drug therapy  Hepatitis C s/p treatment  IV drug abuser    Plan:    Fungemia -based on the blood cultures initially patient was started on IV if micafungin..  He is growing 2 different species one is Mucor species and the second is rhodotorula speices   Based on the findings the micafungin has been DC'd and amphotericin B liposomal has been started.  Patient will need that at least for 6  weeks.  I had consultation with the [facility] ID and they recommended 6 weeks of antibiotic and cardiothoracic surgeon evaluation due to the tri cuspid valve vegetation.    Drug abuse-patient has been on heroine and meth and has been a chronic user and it has been December 1 is the last use as per the patient.  He probably abused the drug and the family brought in something so has been recommended no family visits allowed now until discharge.  Urine drug screen did confirm methamphetamines    [facility] has been accepted by [facility] but pending bed availability we will continue with current management    Patient has been explained the goals of care and is agreeable with the above    Ravin Wong MD  12/12/20  09:23 EST      Please note that portions of this note were completed with a voice recognition program.

## 2020-12-12 NOTE — PLAN OF CARE
Goal Outcome Evaluation:  Plan of Care Reviewed With: patient  Progress: no change  Outcome Summary: Mild COWS symptoms improving. Still waiting for placement at . Patient is aware of plan. Antifungal still being infused every 24 hours. Patient is tolerating medicine well.

## 2020-12-12 NOTE — PLAN OF CARE
Goal Outcome Evaluation:  Plan of Care Reviewed With: patient  Progress: no change     Awaiting bed availability at  for transfer. PICC in place for long term IV atbx. No issues overnight

## 2020-12-12 NOTE — DISCHARGE PLACEMENT REQUEST
"Stephen Sharif (45 y.o. Male)     Date of Birth Social Security Number Address Home Phone MRN    1975  [address]  [facility] [address] [phone] [id number]    Ep[employer] Marital Status          None Single       Admission Date Admission Type Admitting Provider Attending Provider Department, Room/Bed    12/3/20 Emergency Valerie Killian DO Gandee, Julie G, DO [facility] MED SURG  4, 432/1    Discharge Date Discharge Disposition Discharge Destination                       Attending Provider: Valerie Killian DO    Allergies: No Known Allergies    Isolation: None   Infection: None   Code Status: CPR    Ht: 165.1 cm (65\")   Wt: 69 kg (152 lb 1.9 oz)    Admission Cmt: None   Principal Problem: Fungemia [B49]                 Active Insurance as of 12/3/2020     Primary Coverage     Payor Plan Insurance Group Employer/Plan Group    [employer] PAS[employer] [id number]     Payor Plan Address Payor Plan Phone Number Payor Plan Fax Number Effective Dates    [facility] [phone]  10/1/2015 - None Entered    [facility] [address]       Subscriber Name Subscriber Birth Date Member ID       STEPHEN SHARIF 1975 [id number]                 Emergency Contacts      (Rel.) Home Phone Work Phone Mobile Phone    Kelly Lewis (Sister) [phone] -- --    TatianaSandra (Mother) [phone] -- --               History & Physical      Avis Fabian DO at 20 0145              [facility] HOSPITALIST   HISTORY AND PHYSICAL      Name:  Stephen Sharif   Age:  45 y.o.  Sex:  male  :  1975  MRN:  [id number]   Visit Number:  [id number]  Admission Date:  12/3/2020  Date Of Service:  20  Primary Care Physician:  Provider, No Known    Chief Complaint: \"I want to get better\"     History Of Presenting Illness:       45 M h/o IVDA, tobaccoism, hepatitis C status post treatment, incarceration who was brought to the ED after he was found sitting in the [employer] " bathroom with needles beside him very lethargic.  He triggered sepsis criteria upon arrival in the ED 3 days ago, blood cultures took 48 hours to grow fungus. He was receiving IV rick. Patient eloped with an IV yday, and returned to the ED last night feeling regretful. He still has his IV in place. Susceptibilities and specificities are pending.  He denies visual symptoms or pain in any specific location.     Review Of Systems:     A full 10 point review of systems was performed and all pertinent positives and negatives are noted in the HPI.     Past Medical History:    Past Medical History:   Diagnosis Date   • Cancer (CMS/HCC)     skin       Past Surgical history:    History reviewed. No pertinent surgical history.    Social History:    Social History     Socioeconomic History   • Marital status: Single     Spouse name: Not on file   • Number of children: Not on file   • Years of education: Not on file   • Highest education level: Not on file   Tobacco Use   • Smoking status: Current Every Day Smoker     Packs/day: 1.00     Types: Cigarettes   • Smokeless tobacco: Former User   Substance and Sexual Activity   • Alcohol use: No     Frequency: Never   • Drug use: Yes     Types: IV     Comment: suboxone for opiods       Family History:    History reviewed. No pertinent family history.  Allergies:      Patient has no known allergies.    Home Medications:    Prior to Admission Medications     Prescriptions Last Dose Informant Patient Reported? Taking?    acetaminophen (TYLENOL 8 HOUR) 650 MG 8 hr tablet   No Yes    Take 1 tablet by mouth Every 8 (Eight) Hours As Needed for Mild Pain .    albuterol sulfate  (90 Base) MCG/ACT inhaler   No Yes    Inhale 2 puffs Every 6 (Six) Hours As Needed for Wheezing or Shortness of Air.    furosemide (LASIX) 20 MG tablet   No Yes    Take 1 tablet by mouth Daily    propranolol (INDERAL) 20 MG tablet   No Yes    Take 1 tablet by mouth 2 (two) times a day.    spironolactone  (ALDACTONE) 50 MG tablet   No Yes    Take 1 tablet by mouth Daily             ED Medications:    Medications - No data to display    Vital Signs:    Temp:  [97.5 °F (36.4 °C)-98.1 °F (36.7 °C)] 97.9 °F (36.6 °C)  Heart Rate:  [61-85] 81  Resp:  [18] 18  BP: (106-126)/(44-68) 126/63        12/03/20  2132 12/03/20  2355   Weight: 77.9 kg (171 lb 12.8 oz) 68.5 kg (151 lb)       Body mass index is 25.13 kg/m².    Physical Exam:  General: NAD  HEENT: EOMI, NC/AT  Heart: regular  Lungs: nonlabored  Abdomen: Soft, nondistended, nontender  Extremities: No edema  Neurological: A&O x3, moves all extremities  Psychological: good eye contact  Skin: warm, dry, tattoos    Laboratory data:    I have reviewed the labs done in the emergency room.    Results from last 7 days   Lab Units 12/03/20 2304 12/02/20 2328 12/02/20  0745  11/30/20  1641   SODIUM mmol/L 140 141 135*   < > 140   POTASSIUM mmol/L 3.8 4.0 4.0   < > 4.3   CHLORIDE mmol/L 106 107 108*   < > 107   CO2 mmol/L 25.1 27.9 22.5   < > 22.4   BUN mg/dL 16 16 19   < > 17   CREATININE mg/dL 0.74* 0.76 0.63*   < > 0.78   CALCIUM mg/dL 8.9 9.2 8.7   < > 8.8   BILIRUBIN mg/dL 0.8 0.8  --   --  1.4*   ALK PHOS U/L 123* 119*  --   --  157*   ALT (SGPT) U/L 160* 168*  --   --  197*   AST (SGOT) U/L 158* 147*  --   --  184*   GLUCOSE mg/dL 99 105* 109*   < > 120*    < > = values in this interval not displayed.     Results from last 7 days   Lab Units 12/03/20 2304 12/02/20 2328 12/02/20  0745   WBC 10*3/mm3 6.53 7.89 9.14   HEMOGLOBIN g/dL 12.1* 13.1 12.4*   HEMATOCRIT % 37.0* 40.0 38.5   PLATELETS 10*3/mm3 133* 121* 85*     Results from last 7 days   Lab Units 11/30/20  1641   INR  1.26*     Results from last 7 days   Lab Units 11/30/20  1641   TROPONIN T ng/mL <0.010     Results from last 7 days   Lab Units 11/30/20  1641   PROBNP pg/mL 104.1         Results from last 7 days   Lab Units 11/30/20  1641   LIPASE U/L 21         Results from last 7 days   Lab Units 11/30/20  1832    COLOR UA  Yellow   GLUCOSE UA  Negative   KETONES UA  Negative   LEUKOCYTES UA  Negative   PH, URINE  5.5   BILIRUBIN UA  Negative   UROBILINOGEN UA  1.0 E.U./dL     Pain Management Panel     Pain Management Panel Latest Ref Rng & Units 12/3/2020 11/30/2020    AMPHETAMINES SCREEN, URINE Negative Positive(A) Positive(A)    BARBITURATES SCREEN Negative Negative Negative    BENZODIAZEPINE SCREEN, URINE Negative Negative Negative    BUPRENORPHINEUR Negative Negative Negative    COCAINE SCREEN, URINE Negative Negative Negative    METHADONE SCREEN, URINE Negative Negative Negative    METHAMPHETAMINEUR Negative Positive(A) Positive(A)        Results from last 7 days   Lab Units 12/02/20  2333 12/02/20  2327 11/30/20  1655 11/30/20  1650   BLOODCX  No growth at 24 hours No growth at 24 hours Fungus (Organism type)* Abnormal Stain*     Radiology:    Imaging Results (Last 72 Hours)     ** No results found for the last 72 hours. **            Fungemia    Lactic acidosis    Polysubstance abuse (CMS/HCC)       Assessment:  Fungemia, with associated sepsis   Rule out endocarditis  Thrombocytopenia likely sepsis mediated versus cirrhosis  Polysubstance abuse  Hep C s/p treatment complicated by cirrhosis     Plan:   -continue empiric IV micafungin with plan to de-escalate to p.o. fluconazole high-dose if susceptible; PCT trending down  -Echocardiogram without veg, will consider cardio consult and EMILY   -Ophthalmology follow-up outpatient   -continue PO Lasix, spironolactone, nadolol  -high risk, inpatient, full code    Avis Fabian DO  12/04/20  01:46 EST    Dictated utilizing Dragon dictation.      Electronically signed by Avis Fabian DO at 12/04/20 0150         Current Facility-Administered Medications   Medication Dose Route Frequency Provider Last Rate Last Admin   • acetaminophen (TYLENOL) tablet 650 mg  650 mg Oral Q4H PRN Avis Fabian DO       • acetaminophen (TYLENOL) tablet 650 mg  650 mg Oral Q24H Ravin Wong MD    650 mg at 12/11/20 1524   • amphotericin B liposome (AMBISOME) 350 mg in dextrose (D5W) 5 % 200 mL IVPB  350 mg Intravenous Q24H Ravin Wong .8 mL/hr at 12/11/20 1608 350 mg at 12/11/20 1608   • clonazePAM (KlonoPIN) tablet 0.5 mg  0.5 mg Oral BID PRN Ravin Wong MD   0.5 mg at 12/10/20 1708   • clonazePAM (KlonoPIN) tablet 0.5 mg  0.5 mg Oral TID Ravin Wong MD   0.5 mg at 12/12/20 0829   • dextrose (D5W) 5 % infusion 250 mL  250 mL Intravenous Q24H Alcides Rasmussen MD   250 mL at 12/11/20 1609   • dextrose 5 % (D5W) flush 10 mL  10 mL Intravenous Q24H Ravin Wong MD   10 mL at 12/11/20 1525   • dextrose 5 % (D5W) flush 10 mL  10 mL Intravenous Q24H Ravin Wong MD   10 mL at 12/11/20 2000   • diphenhydrAMINE (BENADRYL) capsule 50 mg  50 mg Oral Daily PRN Ravin Wong MD   50 mg at 12/07/20 1520   • diphenhydrAMINE (BENADRYL) capsule 50 mg  50 mg Oral Q24H Ravin Wong MD   50 mg at 12/11/20 1524   • enoxaparin (LOVENOX) syringe 40 mg  40 mg Subcutaneous Q24H Avis Fabian DO   40 mg at 12/12/20 0209   • furosemide (LASIX) tablet 20 mg  20 mg Oral Daily Avis Fabian DO   20 mg at 12/12/20 0830   • nicotine (NICODERM CQ) 21 MG/24HR patch 1 patch  1 patch Transdermal Q24H Avis Fabian DO   1 patch at 12/12/20 0210   • ondansetron (ZOFRAN) injection 4 mg  4 mg Intravenous Q6H PRN Avis Fabian DO       • ondansetron ODT (ZOFRAN-ODT) disintegrating tablet 4 mg  4 mg Oral Q6H PRN Avis Fabian DO   4 mg at 12/05/20 1412   • propranolol (INDERAL) tablet 20 mg  20 mg Oral BID Avis Fabian DO   20 mg at 12/12/20 0829   • sodium chloride 0.9 % bolus 500 mL  500 mL Intravenous Q24H Ravin Wong  mL/hr at 12/10/20 1726 500 mL at 12/10/20 1726   • sodium chloride 0.9 % flush 10 mL  10 mL Intravenous PRN Avis Fabian DO   10 mL at 12/12/20 0835   • sodium chloride 0.9 % flush 10 mL  10 mL Intravenous Q12H Ravin Wong MD   10 mL at 12/11/20 2009   • sodium chloride 0.9 %  flush 10 mL  10 mL Intravenous Q12H Ravin Wong MD   10 mL at 20 0835   • sodium chloride 0.9 % flush 10 mL  10 mL Intravenous Q12H Ravin Wong MD   10 mL at 20 0834   • sodium chloride 0.9 % flush 10 mL  10 mL Intravenous PRN Ravin Wong MD   10 mL at 12/10/20 2157   • sodium chloride 0.9 % flush 10 mL  10 mL Intravenous Q12H Alcides Rasmussen MD   10 mL at 20 0834   • sodium chloride 0.9 % flush 10 mL  10 mL Intravenous PRN Alcides Rasmussen MD       • sodium chloride 0.9 % flush 20 mL  20 mL Intravenous PRN Ravin Wong MD       • sodium chloride 0.9 % flush 20 mL  20 mL Intravenous PRN Alcides Rasmussen MD       • spironolactone (ALDACTONE) tablet 50 mg  50 mg Oral Daily Avis Fabian DO   50 mg at 20 0828        Physician Progress Notes (last 48 hours) (Notes from 12/10/20 0927 through 20)      Ravin Wong MD at 20 0923                [facility]    Name:  Stephen Diallo   Age:  45 y.o.  Sex:  male  :  1975  MRN:  [id number]   Visit Number:  [id number]  Admission Date:  12/3/2020  Date Of Service:  20  Primary Care Physician:  Provider, No Known     LOS: 9 days :  Patient Care Team:  Provider, No Known as PCP - General  Provider, No Known as PCP - Family Medicine:      Subjective / Interval History:     45 M h/o IVDA, hepatitis C status post treatment, incarceration who has been having fungemia.  Patient was admitted and had signed out AMA has returned back.  Chart and events reviewed from the prior admit.  Patient has been started on micafungin since this admission Dr. Lerma did a EMILY and found vegetation on the tricuspid valve.  The blood culture showed 2 different organisms Mucor species and rhodotorula species.   the infectious disease pharmacist  was consulted and patient has been started on amphotericin B liposome  Since the patient has the vegetation and 2 different kinds of species of fungal  infection I consulted the ID at [facility].  ID specialist have recommended at least 6 weeks of IV antibiotic and cardiothoracic surgeon for evaluation.  Since the services are not available here patient has been accepted and will be transferred to [facility] pending bed availability.  On the ninth November patient has been accepted but waiting for the bed availability.    Today on November 12 the patient has been seen and evaluated.  Patient is been having slightly low pressure since her clonidine was started.  He also had visit and as per the nurse possibility was for getting some recreational drugs or urine tox screen was done and it did confirm and show methamphetamines  Vital Signs:    Temp:  [97.5 °F (36.4 °C)-98.4 °F (36.9 °C)] 98.1 °F (36.7 °C)  Heart Rate:  [66-79] 74  Resp:  [15-18] 18  BP: ()/(41-54) 96/43    Intake and output:    I/O last 3 completed shifts:  In: 2850 [P.O.:2400; I.V.:50; IV Piggyback:400]  Out: 2020 [Urine:2020]  No intake/output data recorded.    Physical Examination:    General Appearance:    Alert and cooperative, not in any acute distress.   Head:    Atraumatic and normocephalic, without obvious abnormality.   Eyes:            PERRLA,  No pallor. Extraocular movements are within normal limits.   Neck:   Supple,  No lymph glands, no bruit   Lungs:     Chest shape is normal. Breath sounds heard bilaterally equally.  No crackles or wheezing.     Heart:    Normal S1 and S2, no murmur,  No JVD   Abdomen:     Normal bowel sounds, no masses, no organomegaly. Soft     nontender, no guarding, no rebound tenderness   Extremities:   Moves all extremities well, no edema, no cyanosis,    Skin:   No  bruising or rash.   Neurologic:   Grossly nonfocal and moves all extremities.      Laboratory results:  Results from last 7 days   Lab Units 12/12/20  0542 12/11/20  0534 12/10/20  0546  12/06/20  0526   SODIUM mmol/L 138 140 140   < > 139   POTASSIUM mmol/L 4.9 4.4 4.2   < > 4.3   CHLORIDE  mmol/L 109* 107 108*   < > 107   CO2 mmol/L 20.4* 22.5 21.6*   < > 25.5   BUN mg/dL 40* 40* 29*   < > 19   CREATININE mg/dL 1.33* 1.16 1.09   < > 0.79   CALCIUM mg/dL 9.5 9.8 9.6   < > 9.0   BILIRUBIN mg/dL  --   --   --   --  0.7   ALK PHOS U/L  --   --   --   --  127*   ALT (SGPT) U/L  --   --   --   --  134*   AST (SGOT) U/L  --   --   --   --  132*   GLUCOSE mg/dL 124* 112* 88   < > 92    < > = values in this interval not displayed.     Results from last 7 days   Lab Units 12/12/20  0542 12/06/20  0526   WBC 10*3/mm3 8.44 6.32   HEMOGLOBIN g/dL 12.5* 13.0   HEMATOCRIT % 36.7* 38.6   PLATELETS 10*3/mm3 127* 131*                   Radiology results:    Imaging Results (Last 24 Hours)     ** No results found for the last 24 hours. **          I have reviewed the patient's radiology reports.    Medication Review:     I have reviewed the patients active and prn medications.     Assessment:      Fungemia    Fungal endocarditis    Lactic acidosis    Polysubstance abuse (CMS/HCC)    Hepatitis C virus infection cured after antiviral drug therapy  Hepatitis C s/p treatment  IV drug abuser    Plan:    Fungemia -based on the blood cultures initially patient was started on IV if micafungin..  He is growing 2 different species one is Mucor species and the second is rhodotorula speices   Based on the findings the micafungin has been DC'd and amphotericin B liposomal has been started.  Patient will need that at least for 6  weeks.  I had consultation with the [facility] ID and they recommended 6 weeks of antibiotic and cardiothoracic surgeon evaluation due to the tri cuspid valve vegetation.    Drug abuse-patient has been on heroine and meth and has been a chronic user and it has been December 1 is the last use as per the patient.  He probably abused the drug and the family brought in something so has been recommended no family visits allowed now until discharge.  Urine drug screen did confirm methamphetamines    Patient has been  accepted by  but pending bed availability we will continue with current management    Patient has been explained the goals of care and is agreeable with the above    Ravin Wong MD  20  09:23 EST      Please note that portions of this note were completed with a voice recognition program.      Electronically signed by Ravin Wong MD at 20 0995     Ravin Wong MD at 20 0819                [facility]    PROGRESS NOTE    Name:  Stephen Diallo   Age:  45 y.o.  Sex:  male  :  1975  MRN:  [id number]   Visit Number:  [id number]  Admission Date:  12/3/2020  Date Of Service:  20  Primary Care Physician:  Provider, No Known     LOS: 8 days :  Patient Care Team:  Provider, No Known as PCP - General  Provider, No Known as PCP - Family Medicine:      Subjective / Interval History:     45 M h/o IVDA, hepatitis C status post treatment, incarceration who has been having fungemia.  Patient was admitted and had signed out AMA has returned back.  Chart and events reviewed from the prior admit.  Patient has been started on micafungin since this admission Dr. Lerma did a EMILY and found vegetation on the tricuspid valve.  The blood culture showed 2 different organisms Mucor species and rhodotorula species.   the infectious disease pharmacist  was consulted and patient has been started on amphotericin B liposome  Since the patient has the vegetation and 2 different kinds of species of fungal infection I consulted the ID at [facility].  ID specialist have recommended at least 6 weeks of IV antibiotic and cardiothoracic surgeon for evaluation.  Since the services are not available here patient has been accepted and will be transferred to [facility] pending bed availability.  On the  patient has been accepted but waiting for the bed availability.    Today on  patient has been seen and evaluated.  He has been having some diaphoresis and signs of  withdrawal so yesterday clonidine and Klonopin doses were added and adjusted.  He is feeling a little better no tremors at present    Vital Signs:    Temp:  [98 °F (36.7 °C)-98.4 °F (36.9 °C)] 98 °F (36.7 °C)  Heart Rate:  [64-73] 73  Resp:  [16] 16  BP: (106-117)/(47-56) 108/56    Intake and output:    I/O last 3 completed shifts:  In: 1760 [P.O.:1540; I.V.:20; IV Piggyback:200]  Out: 2050 [Urine:2050]  No intake/output data recorded.    Physical Examination:    General Appearance:    Alert and cooperative, not in any acute distress.   Head:    Atraumatic and normocephalic, without obvious abnormality.   Eyes:            PERRLA,  No pallor. Extraocular movements are within normal limits.   Neck:   Supple,  No lymph glands, no bruit   Lungs:     Chest shape is normal. Breath sounds heard bilaterally equally.  No crackles or wheezing.     Heart:    Normal S1 and S2, no murmur,  No JVD   Abdomen:     Normal bowel sounds, no masses, no organomegaly. Soft     nontender, no guarding, no rebound tenderness   Extremities:   Moves all extremities well, no edema, no cyanosis,    Skin:   No  bruising or rash.   Neurologic:   Grossly nonfocal and moves all extremities.      Laboratory results:  Results from last 7 days   Lab Units 12/11/20  0534 12/10/20  0546 12/09/20  0604  12/06/20  0526   SODIUM mmol/L 140 140 138   < > 139   POTASSIUM mmol/L 4.4 4.2 4.5   < > 4.3   CHLORIDE mmol/L 107 108* 109*   < > 107   CO2 mmol/L 22.5 21.6* 21.8*   < > 25.5   BUN mg/dL 40* 29* 26*   < > 19   CREATININE mg/dL 1.16 1.09 1.15   < > 0.79   CALCIUM mg/dL 9.8 9.6 9.2   < > 9.0   BILIRUBIN mg/dL  --   --   --   --  0.7   ALK PHOS U/L  --   --   --   --  127*   ALT (SGPT) U/L  --   --   --   --  134*   AST (SGOT) U/L  --   --   --   --  132*   GLUCOSE mg/dL 112* 88 98   < > 92    < > = values in this interval not displayed.     Results from last 7 days   Lab Units 12/06/20  0526   WBC 10*3/mm3 6.32   HEMOGLOBIN g/dL 13.0   HEMATOCRIT % 38.6    PLATELETS 10*3/mm3 131*                   Radiology results:    Imaging Results (Last 24 Hours)     ** No results found for the last 24 hours. **          I have reviewed the patient's radiology reports.    Medication Review:     I have reviewed the patients active and prn medications.     Assessment:      Fungemia    Fungal endocarditis    Lactic acidosis    Polysubstance abuse (CMS/HCC)    Hepatitis C virus infection cured after antiviral drug therapy  Hepatitis C s/p treatment  IV drug abuser    Plan:    Fungemia -based on the blood cultures initially patient was started on IV if micafungin..  He is growing 2 different species one is Mucor species and the second is rhodotorula speices   Based on the findings the micafungin has been DC'd and amphotericin B liposomal has been started.  Patient will need that at least for 6  weeks.  I had consultation with the [facility] ID and they recommended 6 weeks of antibiotic and cardiothoracic surgeon evaluation due to the stress cuspid valve vegetation.    Drug withdrawal-patient has been on heroine and meth and has been a chronic user and it has been December 1 is the last use.  Have added clonidine and Klonopin and his symptomatology is controlled    Patient has been accepted by [facility] but pending bed availability we will continue with current management    Patient has been explained the goals of care and is agreeable with the above    Ravin Wong MD  12/11/20  08:32 EST      Please note that portions of this note were completed with a voice recognition program.      Electronically signed by Ravin Wong MD at 12/11/20 3236

## 2020-12-13 LAB
ANION GAP SERPL CALCULATED.3IONS-SCNC: 10.2 MMOL/L (ref 5–15)
BUN SERPL-MCNC: 38 MG/DL (ref 6–20)
BUN/CREAT SERPL: 26.4 (ref 7–25)
CALCIUM SPEC-SCNC: 9.4 MG/DL (ref 8.6–10.5)
CHLORIDE SERPL-SCNC: 108 MMOL/L (ref 98–107)
CO2 SERPL-SCNC: 19.8 MMOL/L (ref 22–29)
CREAT SERPL-MCNC: 1.44 MG/DL (ref 0.76–1.27)
GFR SERPL CREATININE-BSD FRML MDRD: 53 ML/MIN/1.73
GLUCOSE SERPL-MCNC: 118 MG/DL (ref 65–99)
MAGNESIUM SERPL-MCNC: 2.4 MG/DL (ref 1.6–2.6)
POTASSIUM SERPL-SCNC: 4.6 MMOL/L (ref 3.5–5.2)
SODIUM SERPL-SCNC: 138 MMOL/L (ref 136–145)

## 2020-12-13 PROCEDURE — 25010000002 ENOXAPARIN PER 10 MG: Performed by: EMERGENCY MEDICINE

## 2020-12-13 PROCEDURE — 25010000002 ONDANSETRON PER 1 MG: Performed by: EMERGENCY MEDICINE

## 2020-12-13 PROCEDURE — 99232 SBSQ HOSP IP/OBS MODERATE 35: CPT | Performed by: INTERNAL MEDICINE

## 2020-12-13 PROCEDURE — 63710000001 DIPHENHYDRAMINE PER 50 MG: Performed by: INTERNAL MEDICINE

## 2020-12-13 PROCEDURE — 80048 BASIC METABOLIC PNL TOTAL CA: CPT | Performed by: INTERNAL MEDICINE

## 2020-12-13 PROCEDURE — 63710000001 ONDANSETRON ODT 4 MG TABLET DISPERSIBLE: Performed by: EMERGENCY MEDICINE

## 2020-12-13 PROCEDURE — 25010000002 AMPHOTERICIN B LIPOSOME PER 10 MG: Performed by: INTERNAL MEDICINE

## 2020-12-13 PROCEDURE — 83735 ASSAY OF MAGNESIUM: CPT | Performed by: INTERNAL MEDICINE

## 2020-12-13 RX ORDER — SODIUM CHLORIDE 9 MG/ML
100 INJECTION, SOLUTION INTRAVENOUS CONTINUOUS
Status: ACTIVE | OUTPATIENT
Start: 2020-12-13 | End: 2020-12-14

## 2020-12-13 RX ORDER — SPIRONOLACTONE 25 MG/1
25 TABLET ORAL DAILY
Status: DISCONTINUED | OUTPATIENT
Start: 2020-12-14 | End: 2020-12-15 | Stop reason: HOSPADM

## 2020-12-13 RX ORDER — ACETAMINOPHEN AND CODEINE PHOSPHATE 300; 30 MG/1; MG/1
1 TABLET ORAL ONCE
Status: COMPLETED | OUTPATIENT
Start: 2020-12-13 | End: 2020-12-13

## 2020-12-13 RX ADMIN — CLONAZEPAM 0.5 MG: 0.5 TABLET ORAL at 20:40

## 2020-12-13 RX ADMIN — SODIUM CHLORIDE, PRESERVATIVE FREE 10 ML: 5 INJECTION INTRAVENOUS at 15:17

## 2020-12-13 RX ADMIN — ACETAMINOPHEN 650 MG: 325 TABLET, FILM COATED ORAL at 15:15

## 2020-12-13 RX ADMIN — FUROSEMIDE 20 MG: 20 TABLET ORAL at 08:31

## 2020-12-13 RX ADMIN — ONDANSETRON 4 MG: 2 INJECTION, SOLUTION INTRAMUSCULAR; INTRAVENOUS at 08:31

## 2020-12-13 RX ADMIN — PROPRANOLOL HYDROCHLORIDE 20 MG: 20 TABLET ORAL at 08:32

## 2020-12-13 RX ADMIN — DEXTROSE MONOHYDRATE 250 ML: 50 INJECTION, SOLUTION INTRAVENOUS at 16:56

## 2020-12-13 RX ADMIN — ACETAMINOPHEN AND CODEINE PHOSPHATE 1 TABLET: 300; 30 TABLET ORAL at 03:08

## 2020-12-13 RX ADMIN — Medication 1 PATCH: at 00:47

## 2020-12-13 RX ADMIN — SODIUM CHLORIDE, PRESERVATIVE FREE 10 ML: 5 INJECTION INTRAVENOUS at 08:31

## 2020-12-13 RX ADMIN — PROPRANOLOL HYDROCHLORIDE 20 MG: 20 TABLET ORAL at 20:40

## 2020-12-13 RX ADMIN — SODIUM CHLORIDE 100 ML/HR: 9 INJECTION, SOLUTION INTRAVENOUS at 15:28

## 2020-12-13 RX ADMIN — AMPHOTERICIN B 350 MG: 50 INJECTION, POWDER, LYOPHILIZED, FOR SOLUTION INTRAVENOUS at 16:57

## 2020-12-13 RX ADMIN — CLONAZEPAM 0.5 MG: 0.5 TABLET ORAL at 15:17

## 2020-12-13 RX ADMIN — ENOXAPARIN SODIUM 40 MG: 40 INJECTION SUBCUTANEOUS at 00:47

## 2020-12-13 RX ADMIN — SPIRONOLACTONE 50 MG: 25 TABLET, FILM COATED ORAL at 08:32

## 2020-12-13 RX ADMIN — CLONAZEPAM 0.5 MG: 0.5 TABLET ORAL at 15:15

## 2020-12-13 RX ADMIN — CLONAZEPAM 0.5 MG: 0.5 TABLET ORAL at 03:13

## 2020-12-13 RX ADMIN — CLONAZEPAM 0.5 MG: 0.5 TABLET ORAL at 08:31

## 2020-12-13 RX ADMIN — DIPHENHYDRAMINE HYDROCHLORIDE 50 MG: 25 CAPSULE ORAL at 15:16

## 2020-12-13 RX ADMIN — ONDANSETRON 4 MG: 4 TABLET, ORALLY DISINTEGRATING ORAL at 20:40

## 2020-12-13 RX ADMIN — ONDANSETRON 4 MG: 2 INJECTION, SOLUTION INTRAMUSCULAR; INTRAVENOUS at 15:17

## 2020-12-13 NOTE — PLAN OF CARE
Goal Outcome Evaluation:  Plan of Care Reviewed With: patient  Progress: no change Interventions implemented as appropriate.

## 2020-12-13 NOTE — PLAN OF CARE
"Goal Outcome Evaluation:  Plan of Care Reviewed With: patient  Progress: no change    Patient scoring 13 on the COWS at 0500 this a.m.  Obviously agitated, reporting abdominal \"burning\", requesting snacks, restless, and repeatedly asking for \"neurontin, IV pain medication, more klonopin, valium...\" patient was given a x1 dose of Tyl#3 that he requested stating the norco makes his \"stomach hurt\". shortly after prn dose of klonopin given for COWS score. Patient reports neither helped and is resting with KPAD as requested.   Yesterday, Dr Wong ordered for patient to have no visitors and discussed UDS findings with him. Patient has been questioning why he can't have visitors still. Patient has had odd behavior such as sitting in the shower with most of his clothes on under the showerhead with PICC line not covered (dressing subsequently changed) all while his wallet and all of its contents spread all over his bed and his belongings strung all over the room. Screw  found on window sill, patient has also asked for a flashlight. Will continue to manage symptoms and notify MD as appropriate  "

## 2020-12-13 NOTE — PROGRESS NOTES
[facility]    Name:  Stephen Diallo   Age:  45 y.o.  Sex:  male  :  1975  MRN:  [id number]   Visit Number:  [id number]  Admission Date:  12/3/2020  Date Of Service:  20  Primary Care Physician:  Provider, No Known     LOS: 10 days :  Patient Care Team:  Provider, No Known as PCP - General  Provider, No Known as PCP - Family Medicine:      Subjective / Interval History:     45 M h/o IVDA, hepatitis C status post treatment, incarceration who has been having fungemia.  Patient was admitted and had signed out AMA has returned back.  Chart and events reviewed from the prior admit.  Patient has been started on micafungin since this admission Dr. Lerma did a EMILY and found vegetation on the tricuspid valve.  The blood culture showed 2 different organisms Mucor species and rhodotorula species.   the infectious disease pharmacist  was consulted and patient has been started on amphotericin B liposome  Since the patient has the vegetation and 2 different kinds of species of fungal infection I consulted the ID at [facility].  ID specialist have recommended at least 6 weeks of IV antibiotic and cardiothoracic surgeon for evaluation.  Since the services are not available here patient has been accepted and will be transferred to [facility] pending bed availability.  On the  patient has been accepted but waiting for the bed availability.  Patient has had visitors and in the hospital drug screen has become positive so no further visitors are allowed as there is most likely possibility that family change drugs which she has taken while being in the hospital    Today on  patient has been seen and evaluated.  At present he is stable.  Nurses state that he has been acting strange and has more on IV Neurontin and pain medicine.  Vital Signs:    Temp:  [97.6 °F (36.4 °C)-98.4 °F (36.9 °C)] 98.2 °F (36.8 °C)  Heart Rate:  [66-72] 66  Resp:  [18] 18  BP:  ()/(49-60) 102/49    Intake and output:    I/O last 3 completed shifts:  In: 2150 [P.O.:1920; I.V.:30; IV Piggyback:200]  Out: 2000 [Urine:2000]  I/O this shift:  In: 240 [P.O.:240]  Out: -     Physical Examination:    General Appearance:    Alert and cooperative, not in any acute distress.   Head:    Atraumatic and normocephalic, without obvious abnormality.   Eyes:            PERRLA,  No pallor. Extraocular movements are within normal limits.   Neck:   Supple,  No lymph glands, no bruit   Lungs:     Chest shape is normal. Breath sounds heard bilaterally equally.  No crackles or wheezing.     Heart:    Normal S1 and S2, no murmur,  No JVD   Abdomen:     Normal bowel sounds, no masses, no organomegaly. Soft     nontender, no guarding, no rebound tenderness   Extremities:   Moves all extremities well, no edema, no cyanosis,    Skin:   No  bruising or rash.   Neurologic:   Grossly nonfocal and moves all extremities.      Laboratory results:  Results from last 7 days   Lab Units 12/13/20  0657 12/12/20  0542 12/11/20  0534   SODIUM mmol/L 138 138 140   POTASSIUM mmol/L 4.6 4.9 4.4   CHLORIDE mmol/L 108* 109* 107   CO2 mmol/L 19.8* 20.4* 22.5   BUN mg/dL 38* 40* 40*   CREATININE mg/dL 1.44* 1.33* 1.16   CALCIUM mg/dL 9.4 9.5 9.8   GLUCOSE mg/dL 118* 124* 112*     Results from last 7 days   Lab Units 12/12/20  0542   WBC 10*3/mm3 8.44   HEMOGLOBIN g/dL 12.5*   HEMATOCRIT % 36.7*   PLATELETS 10*3/mm3 127*                   Radiology results:    Imaging Results (Last 24 Hours)     ** No results found for the last 24 hours. **          I have reviewed the patient's radiology reports.    Medication Review:     I have reviewed the patients active and prn medications.     Assessment:      Fungemia    Fungal endocarditis    Lactic acidosis    Polysubstance abuse (CMS/HCC)    Hepatitis C virus infection cured after antiviral drug therapy  Hepatitis C s/p treatment  IV drug abuser  Acute kidney injury    Plan:    Fungemia  -based on the blood cultures initially patient was started on IV if micafungin..  He is growing 2 different species one is Mucor species and the second is rhodotorula speices   Based on the findings the micafungin has been DC'd and amphotericin B liposomal has been started.  Patient will need that at least for 6  weeks.  I had consultation with the [facility] ID and they recommended 6 weeks of antibiotic and cardiothoracic surgeon evaluation due to the tri cuspid valve vegetation.    Drug abuse-patient has been on heroine and meth and has been a chronic user and it has been December 1 is the last use as per the patient.  On December 11 family probably brought in some drugs and drug screen was repeated and it was positive.  So no further family lower.  He is on hypo- Klonopin which is long-acting benzo and clonidine was given but it has been stopped due to lower blood pressure.    Acute kidney injury-creatinine has gone up to 1.4 today so we will give IV fluids for the next 24 hours and encourage oral intake to be increased    Patient has been accepted by [facility] but pending bed availability we will continue with current management    Patient has been explained the goals of care and is agreeable with the above    Ravin Wong MD  12/13/20  11:46 EST      Please note that portions of this note were completed with a voice recognition program.

## 2020-12-14 LAB
ALBUMIN SERPL-MCNC: 2.9 G/DL (ref 3.5–5.2)
ALBUMIN/GLOB SERPL: 0.6 G/DL
ALP SERPL-CCNC: 126 U/L (ref 39–117)
ALT SERPL W P-5'-P-CCNC: 192 U/L (ref 1–41)
ANION GAP SERPL CALCULATED.3IONS-SCNC: 10.4 MMOL/L (ref 5–15)
AST SERPL-CCNC: 178 U/L (ref 1–40)
BACTERIA SPEC AEROBE CULT: ABNORMAL
BILIRUB SERPL-MCNC: 0.8 MG/DL (ref 0–1.2)
BILIRUB UR QL STRIP: NEGATIVE
BUN SERPL-MCNC: 47 MG/DL (ref 6–20)
BUN/CREAT SERPL: 26.3 (ref 7–25)
CALCIUM SPEC-SCNC: 9.4 MG/DL (ref 8.6–10.5)
CHLORIDE SERPL-SCNC: 110 MMOL/L (ref 98–107)
CLARITY UR: CLEAR
CO2 SERPL-SCNC: 23.6 MMOL/L (ref 22–29)
COLOR UR: YELLOW
CREAT SERPL-MCNC: 1.79 MG/DL (ref 0.76–1.27)
DEPRECATED RDW RBC AUTO: 50.1 FL (ref 37–54)
ERYTHROCYTE [DISTWIDTH] IN BLOOD BY AUTOMATED COUNT: 14.8 % (ref 12.3–15.4)
GFR SERPL CREATININE-BSD FRML MDRD: 41 ML/MIN/1.73
GLOBULIN UR ELPH-MCNC: 4.7 GM/DL
GLUCOSE SERPL-MCNC: 115 MG/DL (ref 65–99)
GLUCOSE UR STRIP-MCNC: NEGATIVE MG/DL
GRAM STN SPEC: ABNORMAL
HCT VFR BLD AUTO: 39.9 % (ref 37.5–51)
HGB BLD-MCNC: 12.7 G/DL (ref 13–17.7)
HGB UR QL STRIP.AUTO: NEGATIVE
KETONES UR QL STRIP: NEGATIVE
LEUKOCYTE ESTERASE UR QL STRIP.AUTO: NEGATIVE
MAGNESIUM SERPL-MCNC: 2.4 MG/DL (ref 1.6–2.6)
MCH RBC QN AUTO: 29.4 PG (ref 26.6–33)
MCHC RBC AUTO-ENTMCNC: 31.8 G/DL (ref 31.5–35.7)
MCV RBC AUTO: 92.4 FL (ref 79–97)
NITRITE UR QL STRIP: NEGATIVE
PH UR STRIP.AUTO: 6.5 [PH] (ref 5–8)
PLATELET # BLD AUTO: 148 10*3/MM3 (ref 140–450)
PMV BLD AUTO: 11.3 FL (ref 6–12)
POTASSIUM SERPL-SCNC: 4.7 MMOL/L (ref 3.5–5.2)
PROT SERPL-MCNC: 7.6 G/DL (ref 6–8.5)
PROT UR QL STRIP: NEGATIVE
RBC # BLD AUTO: 4.32 10*6/MM3 (ref 4.14–5.8)
SODIUM SERPL-SCNC: 144 MMOL/L (ref 136–145)
SODIUM UR-SCNC: 97 MMOL/L
SP GR UR STRIP: 1.01 (ref 1–1.03)
UROBILINOGEN UR QL STRIP: NORMAL
WBC # BLD AUTO: 7.98 10*3/MM3 (ref 3.4–10.8)

## 2020-12-14 PROCEDURE — 83735 ASSAY OF MAGNESIUM: CPT | Performed by: INTERNAL MEDICINE

## 2020-12-14 PROCEDURE — 25010000002 AMPHOTERICIN B LIPOSOME PER 10 MG: Performed by: INTERNAL MEDICINE

## 2020-12-14 PROCEDURE — 63710000001 DIPHENHYDRAMINE PER 50 MG: Performed by: INTERNAL MEDICINE

## 2020-12-14 PROCEDURE — 25010000002 ENOXAPARIN PER 10 MG: Performed by: EMERGENCY MEDICINE

## 2020-12-14 PROCEDURE — 85027 COMPLETE CBC AUTOMATED: CPT | Performed by: INTERNAL MEDICINE

## 2020-12-14 PROCEDURE — 99232 SBSQ HOSP IP/OBS MODERATE 35: CPT | Performed by: INTERNAL MEDICINE

## 2020-12-14 PROCEDURE — 84300 ASSAY OF URINE SODIUM: CPT | Performed by: INTERNAL MEDICINE

## 2020-12-14 PROCEDURE — 80053 COMPREHEN METABOLIC PANEL: CPT | Performed by: INTERNAL MEDICINE

## 2020-12-14 PROCEDURE — 81003 URINALYSIS AUTO W/O SCOPE: CPT | Performed by: INTERNAL MEDICINE

## 2020-12-14 PROCEDURE — 63710000001 ONDANSETRON ODT 4 MG TABLET DISPERSIBLE: Performed by: EMERGENCY MEDICINE

## 2020-12-14 RX ORDER — SODIUM CHLORIDE 9 MG/ML
100 INJECTION, SOLUTION INTRAVENOUS CONTINUOUS
Status: DISCONTINUED | OUTPATIENT
Start: 2020-12-14 | End: 2020-12-15 | Stop reason: HOSPADM

## 2020-12-14 RX ORDER — ACETAMINOPHEN AND CODEINE PHOSPHATE 300; 30 MG/1; MG/1
1 TABLET ORAL EVERY 6 HOURS PRN
Status: DISCONTINUED | OUTPATIENT
Start: 2020-12-14 | End: 2020-12-15

## 2020-12-14 RX ADMIN — SODIUM CHLORIDE, PRESERVATIVE FREE 10 ML: 5 INJECTION INTRAVENOUS at 08:18

## 2020-12-14 RX ADMIN — SODIUM CHLORIDE 1000 ML: 9 INJECTION, SOLUTION INTRAVENOUS at 15:14

## 2020-12-14 RX ADMIN — ONDANSETRON 4 MG: 4 TABLET, ORALLY DISINTEGRATING ORAL at 04:29

## 2020-12-14 RX ADMIN — ACETAMINOPHEN AND CODEINE PHOSPHATE 1 TABLET: 300; 30 TABLET ORAL at 09:47

## 2020-12-14 RX ADMIN — CLONAZEPAM 0.5 MG: 0.5 TABLET ORAL at 04:29

## 2020-12-14 RX ADMIN — SPIRONOLACTONE 25 MG: 25 TABLET, FILM COATED ORAL at 08:19

## 2020-12-14 RX ADMIN — SODIUM CHLORIDE, PRESERVATIVE FREE 10 ML: 5 INJECTION INTRAVENOUS at 09:00

## 2020-12-14 RX ADMIN — PROPRANOLOL HYDROCHLORIDE 20 MG: 20 TABLET ORAL at 08:19

## 2020-12-14 RX ADMIN — CLONAZEPAM 0.5 MG: 0.5 TABLET ORAL at 20:56

## 2020-12-14 RX ADMIN — CLONAZEPAM 0.5 MG: 0.5 TABLET ORAL at 08:19

## 2020-12-14 RX ADMIN — SODIUM CHLORIDE 100 ML/HR: 9 INJECTION, SOLUTION INTRAVENOUS at 11:11

## 2020-12-14 RX ADMIN — DIPHENHYDRAMINE HYDROCHLORIDE 50 MG: 25 CAPSULE ORAL at 15:23

## 2020-12-14 RX ADMIN — ACETAMINOPHEN 650 MG: 325 TABLET, FILM COATED ORAL at 15:23

## 2020-12-14 RX ADMIN — PROPRANOLOL HYDROCHLORIDE 20 MG: 20 TABLET ORAL at 20:56

## 2020-12-14 RX ADMIN — ONDANSETRON 4 MG: 4 TABLET, ORALLY DISINTEGRATING ORAL at 15:23

## 2020-12-14 RX ADMIN — ENOXAPARIN SODIUM 40 MG: 40 INJECTION SUBCUTANEOUS at 04:28

## 2020-12-14 RX ADMIN — AMPHOTERICIN B 350 MG: 50 INJECTION, POWDER, LYOPHILIZED, FOR SOLUTION INTRAVENOUS at 17:08

## 2020-12-14 RX ADMIN — SODIUM CHLORIDE 100 ML/HR: 9 INJECTION, SOLUTION INTRAVENOUS at 01:35

## 2020-12-14 RX ADMIN — CLONAZEPAM 0.5 MG: 0.5 TABLET ORAL at 15:24

## 2020-12-14 RX ADMIN — ONDANSETRON 4 MG: 4 TABLET, ORALLY DISINTEGRATING ORAL at 09:47

## 2020-12-14 RX ADMIN — Medication 1 PATCH: at 04:29

## 2020-12-14 NOTE — PROGRESS NOTES
[facility]    Name:  Stephen Diallo   Age:  45 y.o.  Sex:  male  :  1975  MRN:  [id number]   Visit Number:  [id number]  Admission Date:  12/3/2020  Date Of Service:  20  Primary Care Physician:  Provider, No Known     LOS: 11 days :  Patient Care Team:  Provider, No Known as PCP - General  Provider, No Known as PCP - Family Medicine:      Subjective / Interval History:     45 M h/o IVDA, hepatitis C status post treatment, incarceration who has been having fungemia.  Patient was admitted and had signed out AMA has returned back.  Chart and events reviewed from the prior admit.  Patient was started on micafungin since this admission Dr. Lerma did a EMILY and found vegetation on the tricuspid valve.  The blood culture showed 2 different organisms Mucor species and rhodotorula species.   the infectious disease pharmacist  was consulted and patient has been started on amphotericin B liposome  Since the patient has the vegetation and 2 different kinds of species of fungal infection I consulted the ID at [facility].  ID specialist have recommended at least 6 weeks of IV antibiotic and cardiothoracic surgeon for evaluation.  Since the services are not available here patient has been accepted and will be transferred to [facility] pending bed availability.  On the  patient has been accepted but waiting for the bed availability.     Today on  patient has been seen and evaluated.  Patient states that he has been having more pain in the back and he would want to get to Tylenol No. 3 tablets at a time.  Besides that he also has some abdominal pain.  Patient has not had any nausea vomiting and he is eating.  There is no tremors  Vital Signs:    Temp:  [97.5 °F (36.4 °C)-98.3 °F (36.8 °C)] 97.8 °F (36.6 °C)  Heart Rate:  [62-75] 66  Resp:  [18] 18  BP: (102-116)/(49-68) 116/68    Intake and output:    I/O last 3 completed shifts:  In: 3350  [P.O.:1920; I.V.:1230; IV Piggyback:200]  Out: 1600 [Urine:1600]  No intake/output data recorded.    Physical Examination:    General Appearance:    Alert and cooperative, not in any acute distress.   Head:    Atraumatic and normocephalic, without obvious abnormality.   Eyes:            PERRLA,  No pallor. Extraocular movements are within normal limits.   Neck:   Supple,  No lymph glands, no bruit   Lungs:     Chest shape is normal. Breath sounds heard bilaterally equally.  No crackles or wheezing.     Heart:    Normal S1 and S2, no murmur,  No JVD   Abdomen:     Normal bowel sounds, no masses, no organomegaly. Soft     nontender, no guarding, no rebound tenderness   Extremities:   Moves all extremities well, no edema, no cyanosis,    Skin:   No  bruising or rash.   Neurologic:   Grossly nonfocal and moves all extremities.      Laboratory results:  Results from last 7 days   Lab Units 12/14/20  0521 12/13/20  0657 12/12/20  0542   SODIUM mmol/L 144 138 138   POTASSIUM mmol/L 4.7 4.6 4.9   CHLORIDE mmol/L 110* 108* 109*   CO2 mmol/L 23.6 19.8* 20.4*   BUN mg/dL 47* 38* 40*   CREATININE mg/dL 1.79* 1.44* 1.33*   CALCIUM mg/dL 9.4 9.4 9.5   BILIRUBIN mg/dL 0.8  --   --    ALK PHOS U/L 126*  --   --    ALT (SGPT) U/L 192*  --   --    AST (SGOT) U/L 178*  --   --    GLUCOSE mg/dL 115* 118* 124*     Results from last 7 days   Lab Units 12/14/20  0521 12/12/20  0542   WBC 10*3/mm3 7.98 8.44   HEMOGLOBIN g/dL 12.7* 12.5*   HEMATOCRIT % 39.9 36.7*   PLATELETS 10*3/mm3 148 127*                   Radiology results:    Imaging Results (Last 24 Hours)     ** No results found for the last 24 hours. **          I have reviewed the patient's radiology reports.    Medication Review:     I have reviewed the patients active and prn medications.     Assessment:      Fungemia    Fungal endocarditis    Lactic acidosis    Polysubstance abuse (CMS/HCC)    Hepatitis C virus infection cured after antiviral drug therapy  Hepatitis C s/p  treatment  IV drug abuser  Acute kidney injury    Plan:    Fungemia -based on the blood cultures initially patient was started on IV if micafungin..  He is growing 2 different species one is Mucor species and the second is rhodotorula speices   Based on the findings the micafungin has been DC'd and amphotericin B liposomal has been started.  Patient will need that at least for 6  weeks.  I had consultation with the [facility] ID and they recommended 6 weeks of antibiotic and cardiothoracic surgeon evaluation due to the tri cuspid valve vegetation.    Drug abuse with mild withdrawal-at present he is on Klonopin and will continue with the Tylenol 3 which does help him.    Acute kidney injury-creatinine has gone up to 1.7 today so we will give IV fluids for the next 24 hours and encourage oral intake to be increased.  Will consult nephrology today    Patient has been accepted by [facility] but pending bed availability we will continue with current management    Patient has been explained the goals of care and is agreeable with the above    Ravin Wong MD  12/14/20  08:28 EST      Please note that portions of this note were completed with a voice recognition program.

## 2020-12-14 NOTE — PROGRESS NOTES
Continued Stay Note   Leon[facility]     Patient Name: Stephen Diallo  MRN: [id number]  Today's Date: 12/14/2020    Admit Date: 12/3/2020    Discharge Plan     Row Name 12/14/20 0928       Plan    Plan  Faxed clinicals to [facility] in [place]. They will review clinicals to determine if patient accepted for their facility. If accepted they will start a pre auth with his insurance.        Discharge Codes    No documentation.       Expected Discharge Date and Time     Expected Discharge Date Expected Discharge Time    Dec 11, 2020             Griselda Oneill LCSW

## 2020-12-14 NOTE — PLAN OF CARE
Goal Outcome Evaluation:  Plan of Care Reviewed With: patient  Progress: no change     No issues overnight thus far. Prn oral zofran given for GI upset and scheduled medications for anxiety/COWS. IVF overnight as ordered via PICC.

## 2020-12-14 NOTE — PLAN OF CARE
Goal Outcome Evaluation:  Plan of Care Reviewed With: patient  Progress: no change  Outcome Summary: VSS att. Still waiting on a bed at . No acute events noted this shift. Will continue to monitor.

## 2020-12-14 NOTE — CONSULTS
[facility]      Nephrology Consultation    Referring Provider:   No ref. provider found    Reason for Consultation:  Acute Kidney Injury and associated problems.    Subjective:  Chief complaint   Chief Complaint   Patient presents with   • Illness     History of present illness:    Patient is 45-year-old  male history of IV drug abuse as well as hep B and C infection who has been in and out of the hospital signing out AGAINST MEDICAL ADVICE presented with worsening symptoms was admitted for further evaluation and noted to have fungal endocarditis.  EMILY shows tricuspid valve vegetation and he has been initially started on micafungin and later on after consulting with ID it has been switched to amphotericin.  He has been getting amphotericin and recently noted to have worsening renal function and I was consulted for further evaluation and treatment.  It does appear that he was supposed to get a liter of fluid before every treatment but has not been getting it for the last 3 days.  He does give a history of nonsteroidals use in the past nothing recently.  I have reviewed labs/imaging/records from this hospitalization, including ER staff and admitting/attending physicians H/P's and progress notes to establish a comprehensive understanding of this patient's clinical hospital course, as well as to establish plan of care appropriately.   Past Medical History:   Diagnosis Date   • Cancer (CMS/HCC)     skin       History reviewed. No pertinent surgical history.  History reviewed. No pertinent family history.  negative h/o ESRD     Social History     Tobacco Use   • Smoking status: Current Every Day Smoker     Packs/day: 1.00     Types: Cigarettes   • Smokeless tobacco: Former User   Substance Use Topics   • Alcohol use: No     Frequency: Never   • Drug use: Yes     Types: IV     Comment: suboxone for opiods     Home medications:   Prior to Admission Medications     Prescriptions Last Dose  Informant Patient Reported? Taking?    acetaminophen (TYLENOL 8 HOUR) 650 MG 8 hr tablet   No Yes    Take 1 tablet by mouth Every 8 (Eight) Hours As Needed for Mild Pain .    albuterol sulfate  (90 Base) MCG/ACT inhaler   No Yes    Inhale 2 puffs Every 6 (Six) Hours As Needed for Wheezing or Shortness of Air.    furosemide (LASIX) 20 MG tablet   No Yes    Take 1 tablet by mouth Daily    propranolol (INDERAL) 20 MG tablet   No Yes    Take 1 tablet by mouth 2 (two) times a day.    spironolactone (ALDACTONE) 50 MG tablet   No Yes    Take 1 tablet by mouth Daily        Emergency department medications:   Medications   propranolol (INDERAL) tablet 20 mg (20 mg Oral Given 12/14/20 0819)   acetaminophen (TYLENOL) tablet 650 mg (650 mg Oral Not Given 12/10/20 1748)   ondansetron (ZOFRAN) injection 4 mg (4 mg Intravenous Given 12/13/20 1517)   enoxaparin (LOVENOX) syringe 40 mg (40 mg Subcutaneous Given 12/14/20 0428)   nicotine (NICODERM CQ) 21 MG/24HR patch 1 patch (1 patch Transdermal Medication Applied 12/14/20 0429)   ondansetron ODT (ZOFRAN-ODT) disintegrating tablet 4 mg (4 mg Oral Given 12/14/20 0429)   sodium chloride 0.9 % flush 10 mL (10 mL Intravenous Given 12/12/20 0835)   ketorolac (TORADOL) injection 15 mg (15 mg Intravenous Given 12/8/20 1515)   HYDROcodone-acetaminophen (NORCO) 5-325 MG per tablet 1 tablet (1 tablet Oral Given 12/11/20 0458)   diphenhydrAMINE (BENADRYL) capsule 50 mg (50 mg Oral Not Given 12/10/20 1725)   sodium chloride 0.9 % flush 10 mL (10 mL Intravenous Not Given 12/13/20 2114)   sodium chloride 0.9 % flush 10 mL (10 mL Intravenous Not Given 12/13/20 2113)   sodium chloride 0.9 % flush 10 mL (10 mL Intravenous Not Given 12/13/20 2113)   sodium chloride 0.9 % flush 10 mL (10 mL Intravenous Given 12/10/20 2157)   sodium chloride 0.9 % flush 20 mL (has no administration in time range)   dextrose 5 % (D5W) flush 10 mL (10 mL Intravenous Given 12/13/20 3456)   amphotericin B liposome  (AMBISOME) 350 mg in dextrose (D5W) 5 % 200 mL IVPB (350 mg Intravenous New Bag 12/13/20 1657)   sodium chloride 0.9 % flush 10 mL (10 mL Intravenous Given 12/14/20 0818)   sodium chloride 0.9 % flush 10 mL (10 mL Intravenous Given 12/13/20 1517)   sodium chloride 0.9 % flush 20 mL (has no administration in time range)   acetaminophen (TYLENOL) tablet 650 mg (650 mg Oral Given 12/13/20 1515)   diphenhydrAMINE (BENADRYL) capsule 50 mg (50 mg Oral Given 12/13/20 1516)   dextrose (D5W) 5 % infusion 250 mL (250 mL Intravenous New Bag 12/13/20 1656)   clonazePAM (KlonoPIN) tablet 0.5 mg (0.5 mg Oral Given 12/14/20 0429)   clonazePAM (KlonoPIN) tablet 0.5 mg (0.5 mg Oral Given 12/14/20 0819)   spironolactone (ALDACTONE) tablet 25 mg (25 mg Oral Given 12/14/20 0819)   sodium chloride 0.9 % infusion (100 mL/hr Intravenous New Bag 12/14/20 0135)   acetaminophen-codeine (TYLENOL #3) 300-30 MG per tablet 1 tablet (has no administration in time range)   sodium chloride 0.9 % infusion (has no administration in time range)   phosphorus (K PHOS NEUTRAL) tablet 2 tablet (2 tablets Oral Given 12/5/20 0900)   hydrOXYzine pamoate (VISTARIL) capsule 25 mg (25 mg Oral Given 12/6/20 2104)   acetaminophen (TYLENOL) tablet 650 mg (650 mg Oral Given 12/7/20 1520)   meperidine (DEMEROL) injection 25 mg (25 mg Intravenous Given 12/7/20 1526)   amphotericin B liposome (AMBISOME) 350 mg in dextrose (D5W) 5 % 200 mL IVPB (350 mg Intravenous New Bag 12/7/20 1644)   acetaminophen-codeine (TYLENOL #3) 300-30 MG per tablet 1 tablet (1 tablet Oral Given 12/13/20 0308)       Allergies:  Patient has no known allergies.    Review of Systems    1. Constitutional: Negative for fever and chills.  Denies any diaphoresis. Complains of fatigue and malaise. Denies any unexpected weight change.   2. HENT: Negative for congestion and hearing loss.   3. Eyes: Negative for redness and visual disturbance.   4. Respiratory: Negative for shortness of breath or cough.  "Negative for chest pain  5. Cardiovascular: Negative for chest pain and chest tightness or palpitations.   6. Gastrointestinal: Negative for abdominal pain or distention, and blood in stool. Denies any Nausea, vomiting, diarrhea or constipation.  7. Endocrine: Negative for heat or cold intolerance.   8. Genitourinary: Negative for difficulty urinating, dysuria and frequency.   9. Musculoskeletal: Negative for arthralgias, recently has started complaining of back pain.  Denies any myalgias.   10. Skin: Negative for color change, rash and wound.   11. Neurological: Negative for syncope, weakness and headaches.   12. Hematological: Negative for adenopathy. Does not bruise/bleed easily.   13. Psychiatric/Behavioral: Negative for confusion. The patient is not nervous/anxious.     Objective:  Vital Signs  /68 (BP Location: Left arm, Patient Position: Lying)   Pulse 66   Temp 97.8 °F (36.6 °C) (Axillary)   Resp 18   Ht 165.1 cm (65\")   Wt 69 kg (152 lb 1.9 oz)   SpO2 98%   BMI 25.31 kg/m²          No intake/output data recorded.    Intake/Output Summary (Last 24 hours) at 12/14/2020 0839  Last data filed at 12/14/2020 0620  Gross per 24 hour   Intake 2870 ml   Output 1100 ml   Net 1770 ml       Physical Exam:  General Appearance:   Alert, cooperative, in no acute distress.     Head:   Normocephalic, without obvious abnormality, atraumatic.     Eyes:      Normal, conjunctivae and sclerae, no icterus, no pallor, corneas clear, PERRLA        Throat:   Oral mucosa dry      Neck:  No adenopathy, supple, trachea midline, no thyromegaly, no carotid bruit, no JVD      Back:   No CVA tenderness on Percussion.     Lungs:    Clear to auscultation and fair air movement noted.      Heart::   Regular rhythm and normal rate, normal S1 and S2.       Abdomen:   Normal bowel sounds, no masses, no organomegaly, soft non-tender, non-distended, no guarding, no rebound tenderness      Genital urinary:   No urinary bladder palpable "      Extremities:  Moves all extremities, no edema, no cyanosis, no redness.     Pulses:  Pulses palpable and equal bilaterally but weak.     Skin:  No bleeding, bruising or rash        Neurologic:  Cranial nerves grossly intact, move all extremities         Results Review:   Results from last 7 days   Lab Units 12/14/20  0521 12/13/20  0657 12/12/20  0542   SODIUM mmol/L 144 138 138   POTASSIUM mmol/L 4.7 4.6 4.9   CHLORIDE mmol/L 110* 108* 109*   CO2 mmol/L 23.6 19.8* 20.4*   BUN mg/dL 47* 38* 40*   CREATININE mg/dL 1.79* 1.44* 1.33*   CALCIUM mg/dL 9.4 9.4 9.5   ALBUMIN g/dL 2.90*  --   --    BILIRUBIN mg/dL 0.8  --   --    ALK PHOS U/L 126*  --   --    ALT (SGPT) U/L 192*  --   --    AST (SGOT) U/L 178*  --   --    GLUCOSE mg/dL 115* 118* 124*     Estimated Creatinine Clearance: 51.4 mL/min (A) (by C-G formula based on SCr of 1.79 mg/dL (H)).  Results from last 7 days   Lab Units 12/14/20  0521 12/13/20  0657 12/12/20  0542   MAGNESIUM mg/dL 2.4 2.4 2.1         Results from last 7 days   Lab Units 12/14/20  0521 12/12/20  0542   WBC 10*3/mm3 7.98 8.44   HEMOGLOBIN g/dL 12.7* 12.5*   PLATELETS 10*3/mm3 148 127*         Brief Urine Lab Results  (Last result in the past 365 days)      Color   Clarity   Blood   Leuk Est   Nitrite   Protein   CREAT   Urine HCG        11/30/20 1832 Yellow Clear Small (1+) Negative Negative Negative             No results found for: UTPCR  Imaging Results (Last 24 Hours)     ** No results found for the last 24 hours. **        acetaminophen, 650 mg, Oral, Q24H  amphotericin B liposomal (AMBISOME) IVPB in 250 mL D5W, 350 mg, Intravenous, Q24H  clonazePAM, 0.5 mg, Oral, TID  dextrose, 250 mL, Intravenous, Q24H  dextrose 5 %, 10 mL, Intravenous, Q24H  diphenhydrAMINE, 50 mg, Oral, Q24H  enoxaparin, 40 mg, Subcutaneous, Q24H  nicotine, 1 patch, Transdermal, Q24H  propranolol, 20 mg, Oral, BID  sodium chloride, 10 mL, Intravenous, Q12H  sodium chloride, 10 mL, Intravenous, Q12H  sodium  chloride, 10 mL, Intravenous, Q12H  sodium chloride, 10 mL, Intravenous, Q12H  spironolactone, 25 mg, Oral, Daily      sodium chloride, 100 mL/hr, Last Rate: 100 mL/hr (12/14/20 0135)  sodium chloride, 100 mL/hr        Assessment/Plan:     1.   Acute kidney injury: It is likely secondary to amphotericin B toxicity, magnesium has been fairly stable.  It also appears that he was getting IV fluids before the infusion.  The IV fluids were stopped not sure what happened.  And his creatinine started to rise.  We will go ahead and restart the IV fluids with each treatment.  It appears he is complaining of back pain that could be healing of the fungus to the spine.  May need further evaluation.  2.   Fungemia: It is being treated continue the same.  3.   Lactic acidosis:   4.   Polysubstance abuse (CMS/HCC)  5.   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  6.   Hepatitis C virus infection cured after antiviral drug therapy:       Plan:  · Check urine and urine sodium.  · Awaiting transfer to the tertiary care center for further evaluation.  · Continue with rest of the current treatment plan and surveillance labs.  · Details were discussed with the patient no family in the room.    · Details were also discussed with the hospitalist service.   · Further recommendations will depend on clinical course of the patient during the current hospitalization.    · I also discussed the details with the nursing staff.  · Rest as ordered.    In closing, I sincerely appreciate opportunity to participate in care of this patient. If I can be of any further assistance with the management of this patient, please don’t hesitate to contact me.    Ke Kapadia MD, JORDANAN  12/14/20  08:39 EST    Dictated using Dragon.

## 2020-12-15 VITALS
BODY MASS INDEX: 25.34 KG/M2 | RESPIRATION RATE: 18 BRPM | TEMPERATURE: 97.5 F | HEIGHT: 65 IN | SYSTOLIC BLOOD PRESSURE: 150 MMHG | WEIGHT: 152.12 LBS | DIASTOLIC BLOOD PRESSURE: 52 MMHG | OXYGEN SATURATION: 100 % | HEART RATE: 67 BPM

## 2020-12-15 LAB
ALBUMIN SERPL-MCNC: 2.8 G/DL (ref 3.5–5.2)
ALBUMIN/GLOB SERPL: 0.7 G/DL
ALP SERPL-CCNC: 111 U/L (ref 39–117)
ALT SERPL W P-5'-P-CCNC: 184 U/L (ref 1–41)
ANION GAP SERPL CALCULATED.3IONS-SCNC: 7.7 MMOL/L (ref 5–15)
AST SERPL-CCNC: 178 U/L (ref 1–40)
BILIRUB SERPL-MCNC: 1 MG/DL (ref 0–1.2)
BUN SERPL-MCNC: 43 MG/DL (ref 6–20)
BUN/CREAT SERPL: 25.1 (ref 7–25)
CALCIUM SPEC-SCNC: 9.2 MG/DL (ref 8.6–10.5)
CHLORIDE SERPL-SCNC: 111 MMOL/L (ref 98–107)
CO2 SERPL-SCNC: 21.3 MMOL/L (ref 22–29)
CREAT SERPL-MCNC: 1.71 MG/DL (ref 0.76–1.27)
DEPRECATED RDW RBC AUTO: 47.2 FL (ref 37–54)
ERYTHROCYTE [DISTWIDTH] IN BLOOD BY AUTOMATED COUNT: 14.6 % (ref 12.3–15.4)
GFR SERPL CREATININE-BSD FRML MDRD: 44 ML/MIN/1.73
GLOBULIN UR ELPH-MCNC: 4.1 GM/DL
GLUCOSE SERPL-MCNC: 102 MG/DL (ref 65–99)
HCT VFR BLD AUTO: 33.5 % (ref 37.5–51)
HGB BLD-MCNC: 11.3 G/DL (ref 13–17.7)
MAGNESIUM SERPL-MCNC: 2.3 MG/DL (ref 1.6–2.6)
MCH RBC QN AUTO: 30.1 PG (ref 26.6–33)
MCHC RBC AUTO-ENTMCNC: 33.7 G/DL (ref 31.5–35.7)
MCV RBC AUTO: 89.3 FL (ref 79–97)
PLATELET # BLD AUTO: 127 10*3/MM3 (ref 140–450)
PMV BLD AUTO: 11.6 FL (ref 6–12)
POTASSIUM SERPL-SCNC: 4.8 MMOL/L (ref 3.5–5.2)
PROT SERPL-MCNC: 6.9 G/DL (ref 6–8.5)
RBC # BLD AUTO: 3.75 10*6/MM3 (ref 4.14–5.8)
SODIUM SERPL-SCNC: 140 MMOL/L (ref 136–145)
SODIUM UR-SCNC: 87 MMOL/L
WBC # BLD AUTO: 7.24 10*3/MM3 (ref 3.4–10.8)

## 2020-12-15 PROCEDURE — 25010000002 ENOXAPARIN PER 10 MG: Performed by: EMERGENCY MEDICINE

## 2020-12-15 PROCEDURE — 84300 ASSAY OF URINE SODIUM: CPT | Performed by: INTERNAL MEDICINE

## 2020-12-15 PROCEDURE — 85027 COMPLETE CBC AUTOMATED: CPT | Performed by: INTERNAL MEDICINE

## 2020-12-15 PROCEDURE — 80053 COMPREHEN METABOLIC PANEL: CPT | Performed by: INTERNAL MEDICINE

## 2020-12-15 PROCEDURE — 99232 SBSQ HOSP IP/OBS MODERATE 35: CPT | Performed by: INTERNAL MEDICINE

## 2020-12-15 PROCEDURE — 83735 ASSAY OF MAGNESIUM: CPT | Performed by: INTERNAL MEDICINE

## 2020-12-15 RX ORDER — HYDROCODONE BITARTRATE AND ACETAMINOPHEN 5; 325 MG/1; MG/1
1 TABLET ORAL EVERY 6 HOURS PRN
Status: DISCONTINUED | OUTPATIENT
Start: 2020-12-15 | End: 2020-12-15 | Stop reason: HOSPADM

## 2020-12-15 RX ORDER — DICYCLOMINE HYDROCHLORIDE 10 MG/1
10 CAPSULE ORAL 4 TIMES DAILY
Status: DISCONTINUED | OUTPATIENT
Start: 2020-12-15 | End: 2020-12-15 | Stop reason: HOSPADM

## 2020-12-15 RX ORDER — DIPHENHYDRAMINE HCL 25 MG
50 CAPSULE ORAL EVERY 24 HOURS
Status: DISCONTINUED | OUTPATIENT
Start: 2020-12-15 | End: 2020-12-15 | Stop reason: HOSPADM

## 2020-12-15 RX ORDER — ACETAMINOPHEN 325 MG/1
650 TABLET ORAL EVERY 24 HOURS
Status: DISCONTINUED | OUTPATIENT
Start: 2020-12-15 | End: 2020-12-15 | Stop reason: HOSPADM

## 2020-12-15 RX ADMIN — Medication 1 PATCH: at 04:04

## 2020-12-15 RX ADMIN — CLONAZEPAM 0.5 MG: 0.5 TABLET ORAL at 08:25

## 2020-12-15 RX ADMIN — ENOXAPARIN SODIUM 40 MG: 40 INJECTION SUBCUTANEOUS at 04:04

## 2020-12-15 RX ADMIN — SODIUM CHLORIDE, PRESERVATIVE FREE 10 ML: 5 INJECTION INTRAVENOUS at 08:27

## 2020-12-15 RX ADMIN — SPIRONOLACTONE 25 MG: 25 TABLET, FILM COATED ORAL at 08:25

## 2020-12-15 RX ADMIN — SODIUM CHLORIDE, PRESERVATIVE FREE 10 ML: 5 INJECTION INTRAVENOUS at 08:26

## 2020-12-15 RX ADMIN — PROPRANOLOL HYDROCHLORIDE 20 MG: 20 TABLET ORAL at 08:25

## 2020-12-15 RX ADMIN — HYDROCODONE BITARTRATE AND ACETAMINOPHEN 1 TABLET: 5; 325 TABLET ORAL at 08:25

## 2020-12-15 NOTE — PROGRESS NOTES
"Nephrology Progress Note.    LOS: 12 days    Patient Care Team:  Provider, No Known as PCP - General  Provider, No Known as PCP - Family Medicine    Chief Complaint:    Chief Complaint   Patient presents with   • Illness       Subjective:   Follow up for CJ and related issues.  Interval History:   Patient Complaints: none  Patient seen and examined this morning.  Events from last 24 hours noted.  Patient denies having any fevers chills.  No nausea or vomiting no abdominal pain.  Denies any chest pain, shortness of breath or cough and sputum production.  There is no significant edema.   Patient also denies having new onset weakness of numbness of either extremity.  Patient is really concerned about the disease process and is still awaiting transfer to [facility].  History taken from: patient    Objective:    Vital Signs  /52 (BP Location: Left arm, Patient Position: Lying)   Pulse 67   Temp 97.5 °F (36.4 °C) (Oral)   Resp 18   Ht 165.1 cm (65\")   Wt 69 kg (152 lb 1.9 oz)   SpO2 100%   BMI 25.31 kg/m²     No intake/output data recorded.    Intake/Output Summary (Last 24 hours) at 12/15/2020 0801  Last data filed at 12/15/2020 0600  Gross per 24 hour   Intake 5338 ml   Output 2950 ml   Net 2388 ml       Physical Exam:  General Appearance: alert, oriented x 3, no acute distress,   HEENT: Oral mucosa dry, extra occular movements intact. Sclera clear.  Skin: Warm and dry  Neck: supple, no JVD, trachea midline  Lungs:Chest shape is normal. Breath sounds heard bilaterally equally. No crackles, No wheezing.   Heart: regular rate and rhythm. normal S1 and S2, no S3, no rub, peripheral pulses weak but palpable.  Abdomen: Obese, soft, non-tender,  present bowel sounds to auscultation  : no palpable bladder.  Extremities: Trace edema, no cyanosis or clubbing.   Neuro: normal speech and mental status, grossly non focal.     Results Review:   Results from last 7 days   Lab Units 12/15/20  0535 12/14/20  0521 " 12/13/20  0657   SODIUM mmol/L 140 144 138   POTASSIUM mmol/L 4.8 4.7 4.6   CHLORIDE mmol/L 111* 110* 108*   CO2 mmol/L 21.3* 23.6 19.8*   BUN mg/dL 43* 47* 38*   CREATININE mg/dL 1.71* 1.79* 1.44*   CALCIUM mg/dL 9.2 9.4 9.4   ALBUMIN g/dL 2.80* 2.90*  --    BILIRUBIN mg/dL 1.0 0.8  --    ALK PHOS U/L 111 126*  --    ALT (SGPT) U/L 184* 192*  --    AST (SGOT) U/L 178* 178*  --    GLUCOSE mg/dL 102* 115* 118*     Estimated Creatinine Clearance: 53.8 mL/min (A) (by C-G formula based on SCr of 1.71 mg/dL (H)).  Results from last 7 days   Lab Units 12/15/20  0535 12/14/20  0521 12/13/20  0657   MAGNESIUM mg/dL 2.3 2.4 2.4         Results from last 7 days   Lab Units 12/15/20  0535 12/14/20  0521 12/12/20  0542   WBC 10*3/mm3 7.24 7.98 8.44   HEMOGLOBIN g/dL 11.3* 12.7* 12.5*   PLATELETS 10*3/mm3 127* 148 127*         Brief Urine Lab Results  (Last result in the past 365 days)      Color   Clarity   Blood   Leuk Est   Nitrite   Protein   CREAT   Urine HCG        12/14/20 1525 Yellow Clear Negative Negative Negative Negative             No results found for: UTPCR  Imaging Results (Last 24 Hours)     ** No results found for the last 24 hours. **        acetaminophen, 650 mg, Oral, Q24H  amphotericin B liposomal (AMBISOME) IVPB in 250 mL D5W, 350 mg, Intravenous, Q24H  clonazePAM, 0.5 mg, Oral, TID  dextrose, 250 mL, Intravenous, Q24H  dextrose 5 %, 10 mL, Intravenous, Q24H  diphenhydrAMINE, 50 mg, Oral, Q24H  enoxaparin, 40 mg, Subcutaneous, Q24H  nicotine, 1 patch, Transdermal, Q24H  propranolol, 20 mg, Oral, BID  sodium chloride, 1,000 mL, Intravenous, Q24H  sodium chloride, 10 mL, Intravenous, Q12H  sodium chloride, 10 mL, Intravenous, Q12H  sodium chloride, 10 mL, Intravenous, Q12H  sodium chloride, 10 mL, Intravenous, Q12H  spironolactone, 25 mg, Oral, Daily      sodium chloride, 100 mL/hr, Last Rate: 100 mL/hr (12/14/20 1111)          Medication Review:   Current Facility-Administered Medications   Medication Dose  Route Frequency Provider Last Rate Last Admin   • acetaminophen (TYLENOL) tablet 650 mg  650 mg Oral Q4H PRN Avis Fabian DO       • acetaminophen (TYLENOL) tablet 650 mg  650 mg Oral Q24H Ravin Wong MD   650 mg at 12/14/20 1523   • acetaminophen-codeine (TYLENOL #3) 300-30 MG per tablet 1 tablet  1 tablet Oral Q6H PRN Ravin Wong MD   1 tablet at 12/14/20 0947   • amphotericin B liposome (AMBISOME) 350 mg in dextrose (D5W) 5 % 200 mL IVPB  350 mg Intravenous Q24H Ravin Wong .8 mL/hr at 12/14/20 1708 350 mg at 12/14/20 1708   • clonazePAM (KlonoPIN) tablet 0.5 mg  0.5 mg Oral BID PRN Ravin Wong MD   0.5 mg at 12/14/20 0429   • clonazePAM (KlonoPIN) tablet 0.5 mg  0.5 mg Oral TID Ravin Wong MD   0.5 mg at 12/14/20 2056   • dextrose (D5W) 5 % infusion 250 mL  250 mL Intravenous Q24H Alcides Rasmussen MD   Stopped at 12/14/20 1837   • dextrose 5 % (D5W) flush 10 mL  10 mL Intravenous Q24H Ravin Wong MD   10 mL at 12/14/20 1617   • diphenhydrAMINE (BENADRYL) capsule 50 mg  50 mg Oral Daily PRN Ravin Wong MD   50 mg at 12/07/20 1520   • diphenhydrAMINE (BENADRYL) capsule 50 mg  50 mg Oral Q24H Ravin Wong MD   50 mg at 12/14/20 1523   • enoxaparin (LOVENOX) syringe 40 mg  40 mg Subcutaneous Q24H Avis Fabian DO   40 mg at 12/15/20 0404   • nicotine (NICODERM CQ) 21 MG/24HR patch 1 patch  1 patch Transdermal Q24H Avis Fabian DO   1 patch at 12/15/20 0404   • ondansetron (ZOFRAN) injection 4 mg  4 mg Intravenous Q6H PRN Avis Fabian DO   4 mg at 12/13/20 1517   • ondansetron ODT (ZOFRAN-ODT) disintegrating tablet 4 mg  4 mg Oral Q6H PRN Avis Fabian DO   4 mg at 12/14/20 1523   • propranolol (INDERAL) tablet 20 mg  20 mg Oral BID Avis Fabian DO   20 mg at 12/14/20 2056   • sodium chloride 0.9 % bolus 1,000 mL  1,000 mL Intravenous Q24H Ke Kapadia MD, FASN 1,000 mL/hr at 12/14/20 1514 1,000 mL at 12/14/20 1514   • sodium chloride 0.9 % flush 10 mL  10 mL  Intravenous PRN Caren, Umar, DO   10 mL at 12/12/20 0835   • sodium chloride 0.9 % flush 10 mL  10 mL Intravenous Q12H Ravin Wong MD   10 mL at 12/14/20 0900   • sodium chloride 0.9 % flush 10 mL  10 mL Intravenous Q12H Ravin Wong MD   10 mL at 12/14/20 0900   • sodium chloride 0.9 % flush 10 mL  10 mL Intravenous Q12H Ravin Wong MD   10 mL at 12/14/20 0900   • sodium chloride 0.9 % flush 10 mL  10 mL Intravenous PRN Ravin Wong MD   10 mL at 12/10/20 2157   • sodium chloride 0.9 % flush 10 mL  10 mL Intravenous Q12H Alcides Rasmussen MD   10 mL at 12/14/20 0818   • sodium chloride 0.9 % flush 10 mL  10 mL Intravenous PRN Alcides Rasmussen MD   10 mL at 12/13/20 1517   • sodium chloride 0.9 % flush 20 mL  20 mL Intravenous PRN Ravin Wong MD       • sodium chloride 0.9 % flush 20 mL  20 mL Intravenous PRN Alcides Rasmussen MD       • sodium chloride 0.9 % infusion  100 mL/hr Intravenous Continuous Ravin Wong  mL/hr at 12/14/20 1111 100 mL/hr at 12/14/20 1111   • spironolactone (ALDACTONE) tablet 25 mg  25 mg Oral Daily Ravin Wong MD   25 mg at 12/14/20 0819       Assessment/Plan:    1.   Acute kidney injury: It is likely secondary to amphotericin B toxicity, magnesium has been fairly stable.  It also appears that he was getting IV fluids before the infusion.  The IV fluids were stopped not sure what happened.  And his creatinine started to rise.  We will go ahead and restart the IV fluids with each treatment.  It appears he is complaining of back pain that could be healing of the fungus to the spine.  May need further evaluation.  2.   Fungemia: It is being treated continue the same.  3.   Lactic acidosis:   4.   Polysubstance abuse (CMS/HCC)  5.   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  6.   Hepatitis C virus infection cured after antiviral drug therapy:       Plan:  · Renal function appears to have stabilized, likely will start  to improve over the next 24 hours.  · Details were discussed with the patient no family in the room.    · Details were also discussed with the hospitalist service.   · Continue with rest of the current treatment plan and surveillance labs.  · Further recommendations will depend on clinical course of the patient during the current hospitalization.    · I also discussed the details with the nursing staff.  · Rest as ordered.    Ke Kapadia MD, NAGI  12/15/20  08:01 EST    Dictated utilizing Dragon dictation.

## 2020-12-15 NOTE — PROGRESS NOTES
[facility]    Name:  Stephen Diallo   Age:  45 y.o.  Sex:  male  :  1975  MRN:  [id number]   Visit Number:  [id number]  Admission Date:  12/3/2020  Date Of Service:  12/15/20  Primary Care Physician:  Provider, No Known     LOS: 12 days :  Patient Care Team:  Provider, No Known as PCP - General  Provider, No Known as PCP - Family Medicine:      Subjective / Interval History:     45 M h/o IVDA, hepatitis C status post treatment, incarceration who has been having fungemia.  Patient was admitted and had signed out AMA has returned back.  Chart and events reviewed from the prior admit.  Patient was started on micafungin since this admission Dr. Lerma did a EMILY and found vegetation on the tricuspid valve.  The blood culture showed 2 different organisms Mucor species and rhodotorula species.   the infectious disease pharmacist  was consulted and patient has been started on amphotericin B liposome  Since the patient has the vegetation and 2 different kinds of species of fungal infection I consulted the ID at [facility] [place].  ID specialist have recommended at least 6 weeks of IV antibiotic and cardiothoracic surgeon for evaluation.  Since the services are not available here patient has been accepted and will be transferred to [facility] [place] pending bed availability.  On the  patient has been accepted but waiting for the bed availability.  Since  patient has been having slightly worsening renal failure likely related to amphotericin.  Nephrology consult has been done    Today on November 15 patient has been seen and evaluated.  He states that he has been having more abdominal pain and current medicine is not helping with the abdominal pain and the back pain.  He feels the Tylenol 3 now is not helping enough and also has had no nausea vomiting.  His renal function has been stable compared to yesterday and slight improvement noted.    Temp:  [97.2 °F  (36.2 °C)-98.4 °F (36.9 °C)] 97.5 °F (36.4 °C)  Heart Rate:  [64-68] 67  Resp:  [18] 18  BP: ()/(51-62) 150/52    Intake and output:    I/O last 3 completed shifts:  In: 7488 [P.O.:2000; I.V.:4088; IV Piggyback:1400]  Out: 4350 [Urine:4350]  No intake/output data recorded.    Physical Examination:    General Appearance:    Alert and cooperative, not in any acute distress.   Head:    Atraumatic and normocephalic, without obvious abnormality.   Eyes:            PERRLA,  No pallor. Extraocular movements are within normal limits.   Neck:   Supple,  No lymph glands, no bruit   Lungs:     Chest shape is normal. Breath sounds heard bilaterally equally.  No crackles or wheezing.     Heart:    Normal S1 and S2, no murmur,  No JVD   Abdomen:     Normal bowel sounds, no masses, no organomegaly. Soft     nontender, no guarding, no rebound tenderness   Extremities:   Moves all extremities well, no edema, no cyanosis,    Skin:   No  bruising or rash.   Neurologic:   Grossly nonfocal and moves all extremities.      Laboratory results:  Results from last 7 days   Lab Units 12/15/20  0535 12/14/20  0521 12/13/20  0657   SODIUM mmol/L 140 144 138   POTASSIUM mmol/L 4.8 4.7 4.6   CHLORIDE mmol/L 111* 110* 108*   CO2 mmol/L 21.3* 23.6 19.8*   BUN mg/dL 43* 47* 38*   CREATININE mg/dL 1.71* 1.79* 1.44*   CALCIUM mg/dL 9.2 9.4 9.4   BILIRUBIN mg/dL 1.0 0.8  --    ALK PHOS U/L 111 126*  --    ALT (SGPT) U/L 184* 192*  --    AST (SGOT) U/L 178* 178*  --    GLUCOSE mg/dL 102* 115* 118*     Results from last 7 days   Lab Units 12/15/20  0535 12/14/20  0521 12/12/20  0542   WBC 10*3/mm3 7.24 7.98 8.44   HEMOGLOBIN g/dL 11.3* 12.7* 12.5*   HEMATOCRIT % 33.5* 39.9 36.7*   PLATELETS 10*3/mm3 127* 148 127*                   Radiology results:    Imaging Results (Last 24 Hours)     ** No results found for the last 24 hours. **          I have reviewed the patient's radiology reports.    Medication Review:     I have reviewed the patients  active and prn medications.     Assessment:      Fungemia    Fungal endocarditis    Lactic acidosis    Polysubstance abuse (CMS/HCC)    Hepatitis C virus infection cured after antiviral drug therapy  Hepatitis C s/p treatment  IV drug abuser  Acute kidney injury    Plan:    Fungemia -based on the blood cultures initially patient was started on IV if micafungin..  He is growing 2 different species one is Mucor species and the second is rhodotorula speices   Based on the findings the micafungin has been DC'd and amphotericin B liposomal has been started.  Patient will need that at least for 6  weeks.  I had consultation with the [facility] ID and they recommended 6 weeks of antibiotic and cardiothoracic surgeon evaluation due to the tri cuspid valve vegetation.    Drug abuse with mild withdrawal-at present we will start with Bentyl for his abdominal cramps and will DC Tylenol 3 and switch back to the hydrocodone as needed for his back pain and would prevent further withdrawal also    Acute kidney injury-creatinine has gone up to 1.77 and today it is 1.71.  Nephrology consult done and Dr. Lee has started more IV fluids and will follow up    Patient has been accepted by [facility] but pending bed availability we will continue with current management.  Also  is looking at [facility] for IV antibiotic and when bed is available he can be transferred then to  as it would take few weeks probably for him to be accepted    Patient has been explained the goals of care and is agreeable with the above    Ravin Wong MD  12/15/20  08:47 EST      Please note that portions of this note were completed with a voice recognition program.

## 2020-12-15 NOTE — NURSING NOTE
"Patient signed paperwork to leave against medical advice. The severity of his disease and his actions to leave were discussed. Patient advised if he does decide to return to [facility] for treatment he will need to go to [place] so they can treat the vegetation on his heart. Patient expressed understanding stating her \"was going to take an ambulance to \". PICC line taken out, no complications noted.  "

## 2020-12-15 NOTE — PLAN OF CARE
Goal Outcome Evaluation:  Plan of Care Reviewed With: patient  Progress: no change  Outcome Summary: VSS, pt has rested, asked frequently for snacks, been cooperative, no complaints this shift.

## 2020-12-16 ENCOUNTER — HOSPITAL ENCOUNTER (EMERGENCY)
Facility: HOSPITAL | Age: 45
Discharge: LEFT BEFORE DISCHARGE | End: 2020-12-16
Attending: EMERGENCY MEDICINE | Admitting: EMERGENCY MEDICINE

## 2020-12-16 ENCOUNTER — APPOINTMENT (OUTPATIENT)
Dept: GENERAL RADIOLOGY | Facility: HOSPITAL | Age: 45
End: 2020-12-16

## 2020-12-16 VITALS
BODY MASS INDEX: 26.92 KG/M2 | TEMPERATURE: 98.7 F | WEIGHT: 161.6 LBS | HEART RATE: 74 BPM | RESPIRATION RATE: 18 BRPM | DIASTOLIC BLOOD PRESSURE: 67 MMHG | OXYGEN SATURATION: 99 % | SYSTOLIC BLOOD PRESSURE: 115 MMHG | HEIGHT: 65 IN

## 2020-12-16 DIAGNOSIS — R11.2 NAUSEA AND VOMITING, INTRACTABILITY OF VOMITING NOT SPECIFIED, UNSPECIFIED VOMITING TYPE: Primary | ICD-10-CM

## 2020-12-16 LAB
ALBUMIN SERPL-MCNC: 3.3 G/DL (ref 3.5–5.2)
ALBUMIN/GLOB SERPL: 0.7 G/DL
ALP SERPL-CCNC: 118 U/L (ref 39–117)
ALT SERPL W P-5'-P-CCNC: 229 U/L (ref 1–41)
ANION GAP SERPL CALCULATED.3IONS-SCNC: 10 MMOL/L (ref 5–15)
AST SERPL-CCNC: 205 U/L (ref 1–40)
BASOPHILS # BLD AUTO: 0.04 10*3/MM3 (ref 0–0.2)
BASOPHILS NFR BLD AUTO: 0.4 % (ref 0–1.5)
BILIRUB SERPL-MCNC: 1.5 MG/DL (ref 0–1.2)
BUN SERPL-MCNC: 44 MG/DL (ref 6–20)
BUN/CREAT SERPL: 31.4 (ref 7–25)
CALCIUM SPEC-SCNC: 9.7 MG/DL (ref 8.6–10.5)
CHLORIDE SERPL-SCNC: 107 MMOL/L (ref 98–107)
CO2 SERPL-SCNC: 23 MMOL/L (ref 22–29)
CREAT SERPL-MCNC: 1.4 MG/DL (ref 0.76–1.27)
DEPRECATED RDW RBC AUTO: 46.8 FL (ref 37–54)
EOSINOPHIL # BLD AUTO: 0.05 10*3/MM3 (ref 0–0.4)
EOSINOPHIL NFR BLD AUTO: 0.5 % (ref 0.3–6.2)
ERYTHROCYTE [DISTWIDTH] IN BLOOD BY AUTOMATED COUNT: 14.4 % (ref 12.3–15.4)
GFR SERPL CREATININE-BSD FRML MDRD: 55 ML/MIN/1.73
GLOBULIN UR ELPH-MCNC: 4.9 GM/DL
GLUCOSE SERPL-MCNC: 121 MG/DL (ref 65–99)
HCT VFR BLD AUTO: 40.3 % (ref 37.5–51)
HGB BLD-MCNC: 13.6 G/DL (ref 13–17.7)
HOLD SPECIMEN: NORMAL
HOLD SPECIMEN: NORMAL
IMM GRANULOCYTES # BLD AUTO: 0.03 10*3/MM3 (ref 0–0.05)
IMM GRANULOCYTES NFR BLD AUTO: 0.3 % (ref 0–0.5)
LYMPHOCYTES # BLD AUTO: 1.21 10*3/MM3 (ref 0.7–3.1)
LYMPHOCYTES NFR BLD AUTO: 11.6 % (ref 19.6–45.3)
MCH RBC QN AUTO: 30.2 PG (ref 26.6–33)
MCHC RBC AUTO-ENTMCNC: 33.7 G/DL (ref 31.5–35.7)
MCV RBC AUTO: 89.6 FL (ref 79–97)
MONOCYTES # BLD AUTO: 0.96 10*3/MM3 (ref 0.1–0.9)
MONOCYTES NFR BLD AUTO: 9.2 % (ref 5–12)
NEUTROPHILS NFR BLD AUTO: 78 % (ref 42.7–76)
NEUTROPHILS NFR BLD AUTO: 8.11 10*3/MM3 (ref 1.7–7)
NRBC BLD AUTO-RTO: 0 /100 WBC (ref 0–0.2)
PLATELET # BLD AUTO: 149 10*3/MM3 (ref 140–450)
PMV BLD AUTO: 11.7 FL (ref 6–12)
POTASSIUM SERPL-SCNC: 4.6 MMOL/L (ref 3.5–5.2)
PROT SERPL-MCNC: 8.2 G/DL (ref 6–8.5)
RBC # BLD AUTO: 4.5 10*6/MM3 (ref 4.14–5.8)
SODIUM SERPL-SCNC: 140 MMOL/L (ref 136–145)
TROPONIN T SERPL-MCNC: <0.01 NG/ML (ref 0–0.03)
WBC # BLD AUTO: 10.4 10*3/MM3 (ref 3.4–10.8)
WHOLE BLOOD HOLD SPECIMEN: NORMAL
WHOLE BLOOD HOLD SPECIMEN: NORMAL

## 2020-12-16 PROCEDURE — 71045 X-RAY EXAM CHEST 1 VIEW: CPT

## 2020-12-16 PROCEDURE — 80053 COMPREHEN METABOLIC PANEL: CPT | Performed by: EMERGENCY MEDICINE

## 2020-12-16 PROCEDURE — 25010000002 ONDANSETRON PER 1 MG: Performed by: EMERGENCY MEDICINE

## 2020-12-16 PROCEDURE — 99282 EMERGENCY DEPT VISIT SF MDM: CPT

## 2020-12-16 PROCEDURE — 96374 THER/PROPH/DIAG INJ IV PUSH: CPT

## 2020-12-16 PROCEDURE — 93005 ELECTROCARDIOGRAM TRACING: CPT

## 2020-12-16 PROCEDURE — 84484 ASSAY OF TROPONIN QUANT: CPT | Performed by: EMERGENCY MEDICINE

## 2020-12-16 PROCEDURE — 85025 COMPLETE CBC W/AUTO DIFF WBC: CPT | Performed by: EMERGENCY MEDICINE

## 2020-12-16 PROCEDURE — 93005 ELECTROCARDIOGRAM TRACING: CPT | Performed by: EMERGENCY MEDICINE

## 2020-12-16 RX ORDER — SODIUM CHLORIDE 0.9 % (FLUSH) 0.9 %
10 SYRINGE (ML) INJECTION AS NEEDED
Status: DISCONTINUED | OUTPATIENT
Start: 2020-12-16 | End: 2020-12-16 | Stop reason: HOSPADM

## 2020-12-16 RX ORDER — ONDANSETRON 2 MG/ML
4 INJECTION INTRAMUSCULAR; INTRAVENOUS ONCE
Status: COMPLETED | OUTPATIENT
Start: 2020-12-16 | End: 2020-12-16

## 2020-12-16 RX ADMIN — SODIUM CHLORIDE 1000 ML: 9 INJECTION, SOLUTION INTRAVENOUS at 05:38

## 2020-12-16 RX ADMIN — ONDANSETRON 4 MG: 2 INJECTION INTRAMUSCULAR; INTRAVENOUS at 05:38

## 2020-12-16 NOTE — DISCHARGE SUMMARY
[facility]   DISCHARGE SUMMARY      Name:  Stephen Diallo   Age:  45 y.o.  Sex:  male  :  1975  MRN:  [id number]   Visit Number:  [id number]  Primary Care Physician:  Provider, No Known  Date of Discharge: 12-15-20  Admission Date:  12/3/2020      Discharge Diagnosis:         Fungemia    Fungal endocarditis    Lactic acidosis    Polysubstance abuse (CMS/HCC)    Hepatitis C virus infection cured after antiviral drug therapy          Consults:     Consults     Date and Time Order Name Status Description    2020 0833 Inpatient Nephrology Consult Completed     2020 1751 Inpatient Cardiology Consult Completed           Procedures Performed:                 Hospital Course:   The patient was admitted on 12/3/2020  Please see H&P for details on admission HPI and ROS.  Patient left against my medical advice and so no medications were given and nurse explained to him that they are leaving the hospital and with his current condition it could and he still signed the discharge papers to leave AGAINST MEDICAL ADVICE            Pertinent Lab Results:     Results from last 7 days   Lab Units 12/16/20  0515 12/15/20  0535 12/14/20  0521   SODIUM mmol/L 140 140 144   POTASSIUM mmol/L 4.6 4.8 4.7   CHLORIDE mmol/L 107 111* 110*   CO2 mmol/L 23.0 21.3* 23.6   BUN mg/dL 44* 43* 47*   CREATININE mg/dL 1.40* 1.71* 1.79*   CALCIUM mg/dL 9.7 9.2 9.4   BILIRUBIN mg/dL 1.5* 1.0 0.8   ALK PHOS U/L 118* 111 126*   ALT (SGPT) U/L 229* 184* 192*   AST (SGOT) U/L 205* 178* 178*   GLUCOSE mg/dL 121* 102* 115*     Results from last 7 days   Lab Units 20  0515 12/15/20  0535 20  0521   WBC 10*3/mm3 10.40 7.24 7.98   HEMOGLOBIN g/dL 13.6 11.3* 12.7*   HEMATOCRIT % 40.3 33.5* 39.9   PLATELETS 10*3/mm3 149 127* 148         No results found for: BLOODCX, URINECX, WOUNDCX, MRSACX    Pertinent Radiology Results:    Imaging Results (Most Recent)     None                  Discharge Disposition:       Left Against Medical Advice    Discharge Medication:          Discharge Diet:             Follow-up Appointments:      No future appointments.      Test Results Pending at Discharge:      Pending Labs     Order Current Status    Green Top (No Gel) In process    [place] Draw In process             Ravin Wong MD  12/16/20  09:24 EST    Time:      Please note that portions of this note were completed with a voice recognition program.

## 2020-12-16 NOTE — ED NOTES
Pt called out to leave AMA. States needs to go where he can be admitted. Left with family member after speaking to Dr. Sanabria. Pt states did not want to be transferred. IV d/c'd per pt request. AMA paperwork signed     Martha Coles, RN  12/16/20 0552       Martha Coles RN  12/16/20 0553

## 2020-12-16 NOTE — ED PROVIDER NOTES
TRIAGE CHIEF COMPLAINT:     Nursing and triage notes reviewed    Chief Complaint   Patient presents with   • Chest Pain      HPI: Stephen Diallo is a 45 y.o. male who presents to the emergency department complaining of nausea and vomiting and tightness in his chest.  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  Patient had a since left the hospital AGAINST MEDICAL ADVICE 2 times during this treatment but once he left yesterday he began to feel nauseated and has had multiple episodes of vomiting since that time.  While in the hospital physicians I consulted infectious disease at other facilities and ultimately placed the patient on a transfer list to the [facility] so that he could have infectious disease and cardiothoracic surgery consultation.  Due to the ongoing Covid pandemic beds had been very tight and slow to become available at these facilities and patient was still waiting at the time he signed out AGAINST MEDICAL ADVICE yesterday.  Patient states they had called [facility] but still had not received a bed.  Given that he was feeling worse he stated he wanted to come back and continue treatment.    REVIEW OF SYSTEMS: All other systems reviewed and are negative     PAST MEDICAL HISTORY:   Past Medical History:   Diagnosis Date   • Cancer (CMS/HCC)     skin        FAMILY HISTORY:   History reviewed. No pertinent family history.     SOCIAL HISTORY:   Social History     Socioeconomic History   • Marital status: Single     Spouse name: Not on file   • Number of children: Not on file   • Years of education: Not on file   • Highest education level: Not on file   Tobacco Use   • Smoking status: Current Every Day Smoker     Packs/day: 1.00     Types: Cigarettes   • Smokeless tobacco: Former User   Substance and Sexual Activity   • Alcohol use: No     Frequency: Never   • Drug use: Yes     Types: IV     Comment:  suboxone for opiods        SURGICAL HISTORY:   History reviewed. No pertinent surgical history.     CURRENT MEDICATIONS:      Medication List      ASK your doctor about these medications    acetaminophen 650 MG 8 hr tablet  Commonly known as: Tylenol 8 Hour  Take 1 tablet by mouth Every 8 (Eight) Hours As Needed for Mild Pain .     albuterol sulfate  (90 Base) MCG/ACT inhaler  Commonly known as: PROVENTIL HFA;VENTOLIN HFA;PROAIR HFA  Inhale 2 puffs Every 6 (Six) Hours As Needed for Wheezing or Shortness of Air.     furosemide 20 MG tablet  Commonly known as: LASIX  Take 1 tablet by mouth Daily     propranolol 20 MG tablet  Commonly known as: INDERAL  Take 1 tablet by mouth 2 (two) times a day.     spironolactone 50 MG tablet  Commonly known as: ALDACTONE  Take 1 tablet by mouth Daily             ALLERGIES: Patient has no known allergies.     PHYSICAL EXAM:   VITAL SIGNS:   Vitals:    12/16/20 0457   BP: 115/67   Pulse: 74   Resp: 18   Temp: 98.7 °F (37.1 °C)   SpO2: 99%      CONSTITUTIONAL: Awake, oriented, appears non-toxic   HENT: Atraumatic, normocephalic, oral mucosa pink and moist, airway patent. Nares patent without drainage. External ears normal.   EYES: Conjunctiva clear   NECK: Trachea midline, non-tender, supple   CARDIOVASCULAR: Normal heart rate, Normal rhythm, No murmurs, rubs, gallops   PULMONARY/CHEST: Clear to auscultation, no rhonchi, wheezes, or rales. Symmetrical breath sounds.  ABDOMINAL: Non-distended, soft, non-tender - no rebound or guarding.  NEUROLOGIC: Non-focal, moving all four extremities, no gross sensory or motor deficits.   EXTREMITIES: No clubbing, cyanosis, or edema   SKIN: Warm, Dry, No erythema, No rash     ED COURSE / MEDICAL DECISION MAKING:   Stephen Diallo is a 45 y.o. male who presents to the emergency department for evaluation of vomiting and chest discomfort.  I did an extensive chart review revealing that patient has been diagnosed with fungal endocarditis and has  been on IV antifungals for over a week.  Patient has been waiting for transfer to [facility] for further consultation.  Patient had left AGAINST MEDICAL ADVICE yesterday but started feeling ill again with vomiting and nausea and so return to the emergency department.  Had a conversation with patient on his arrival asking if patient were willing to actually stay in the hospital to complete his therapy.  Patient states that he just got scared but was willing to stay.    An EKG was obtained on arrival which I interpreted and reveals sinus rhythm with a rate of 71 bpm there are no obvious acute ST segment or T wave changes.  This is a normal-appearing EKG.    Basic labs did reveal some mild elevation in LFTs and bilirubin, these were around baseline obtain and previous laboratory tests.    Given the ongoing need for treatment of his endocarditis I did recommend admission to the hospital.  I spoke with the hospitalist at this facility who recommended that I speak to hospitals in [place] with infectious disease and cardiothoracic surgical capabilities to see if they have bed availability at this time as ultimately this would be a more appropriate disposition for the patient.  If none of these places were available we will keep patient on a wait list and admit at this facility.  At this time I explained to the patient and his significant other the plan of care.  I stated that we would continue to care for patient but I was going to make some phone calls to try and help transfer the patient to a higher level of care.  I explained to patient and family the rationale behind this decision is ultimately he would likely require care that we could not provide at this facility.  Also explained we could continue antifungal therapy in the meantime.  At this point patient was frustrated and stated that he did not want to wait to be transferred.  I tried explaining to patient that because of limited bed availability  partially due to the ongoing pandemic that sometimes transfers could be delayed however I could get patient accepted at another facility it just may take some time.  Patient asked if there were a reason he could not simply leave this hospital and drive to [place] himself.  I explained EMTALA laws to the patient and that especially since he had been admitted at this facility the most appropriate and legal course of action would be to let me speak to another physician to do an appropriate transfer so that I could adequately share patient's current medical information to provide the best plan of care for ongoing treatment.  Ultimately patient decided he did not want to wait and decided to sign out AGAINST MEDICAL ADVICE.  I reiterated to the patient that I could not in good conscious recommend that patient do this without letting me properly attempt to transfer.    DECISION TO DISCHARGE/ADMIT: see ED care timeline     FINAL IMPRESSION:   1 -- nausea and vomiting   2 -- signed out against medical advice  3 --     Electronically signed by: Hortencia Sanabria MD, 12/16/2020 05:59 Hortencia Lopez MD  12/16/20 0624

## 2021-03-19 ENCOUNTER — APPOINTMENT (OUTPATIENT)
Dept: GENERAL RADIOLOGY | Facility: HOSPITAL | Age: 46
End: 2021-03-19

## 2021-03-19 ENCOUNTER — HOSPITAL ENCOUNTER (EMERGENCY)
Facility: HOSPITAL | Age: 46
Discharge: HOME | End: 2021-03-19
Attending: EMERGENCY MEDICINE | Admitting: EMERGENCY MEDICINE

## 2021-03-19 VITALS
WEIGHT: 140 LBS | BODY MASS INDEX: 22.5 KG/M2 | DIASTOLIC BLOOD PRESSURE: 85 MMHG | OXYGEN SATURATION: 97 % | RESPIRATION RATE: 18 BRPM | SYSTOLIC BLOOD PRESSURE: 129 MMHG | HEART RATE: 94 BPM | TEMPERATURE: 98.8 F | HEIGHT: 66 IN

## 2021-03-19 DIAGNOSIS — R07.9 CHEST PAIN, UNSPECIFIED TYPE: ICD-10-CM

## 2021-03-19 DIAGNOSIS — F15.10 METHAMPHETAMINE ABUSE (HCC): Primary | ICD-10-CM

## 2021-03-19 LAB
ALBUMIN SERPL-MCNC: 3.4 G/DL (ref 3.5–5.2)
ALBUMIN/GLOB SERPL: 1.3 G/DL
ALP SERPL-CCNC: 114 U/L (ref 39–117)
ALT SERPL W P-5'-P-CCNC: 116 U/L (ref 1–41)
ANION GAP SERPL CALCULATED.3IONS-SCNC: 7.7 MMOL/L (ref 5–15)
AST SERPL-CCNC: 156 U/L (ref 1–40)
BASOPHILS # BLD AUTO: 0.02 10*3/MM3 (ref 0–0.2)
BASOPHILS NFR BLD AUTO: 0.3 % (ref 0–1.5)
BILIRUB SERPL-MCNC: 1.5 MG/DL (ref 0–1.2)
BUN SERPL-MCNC: 14 MG/DL (ref 6–20)
BUN/CREAT SERPL: 17.9 (ref 7–25)
CALCIUM SPEC-SCNC: 8.5 MG/DL (ref 8.6–10.5)
CHLORIDE SERPL-SCNC: 104 MMOL/L (ref 98–107)
CO2 SERPL-SCNC: 26.3 MMOL/L (ref 22–29)
CREAT SERPL-MCNC: 0.78 MG/DL (ref 0.76–1.27)
DEPRECATED RDW RBC AUTO: 44.8 FL (ref 37–54)
EOSINOPHIL # BLD AUTO: 0.19 10*3/MM3 (ref 0–0.4)
EOSINOPHIL NFR BLD AUTO: 3 % (ref 0.3–6.2)
ERYTHROCYTE [DISTWIDTH] IN BLOOD BY AUTOMATED COUNT: 13.2 % (ref 12.3–15.4)
GFR SERPL CREATININE-BSD FRML MDRD: 108 ML/MIN/1.73
GLOBULIN UR ELPH-MCNC: 2.7 GM/DL
GLUCOSE SERPL-MCNC: 126 MG/DL (ref 65–99)
HCT VFR BLD AUTO: 35 % (ref 37.5–51)
HGB BLD-MCNC: 11.8 G/DL (ref 13–17.7)
HOLD SPECIMEN: NORMAL
HOLD SPECIMEN: NORMAL
IMM GRANULOCYTES # BLD AUTO: 0.01 10*3/MM3 (ref 0–0.05)
IMM GRANULOCYTES NFR BLD AUTO: 0.2 % (ref 0–0.5)
LYMPHOCYTES # BLD AUTO: 1.8 10*3/MM3 (ref 0.7–3.1)
LYMPHOCYTES NFR BLD AUTO: 28.9 % (ref 19.6–45.3)
MCH RBC QN AUTO: 30.6 PG (ref 26.6–33)
MCHC RBC AUTO-ENTMCNC: 33.7 G/DL (ref 31.5–35.7)
MCV RBC AUTO: 90.7 FL (ref 79–97)
MONOCYTES # BLD AUTO: 0.7 10*3/MM3 (ref 0.1–0.9)
MONOCYTES NFR BLD AUTO: 11.2 % (ref 5–12)
NEUTROPHILS NFR BLD AUTO: 3.51 10*3/MM3 (ref 1.7–7)
NEUTROPHILS NFR BLD AUTO: 56.4 % (ref 42.7–76)
NRBC BLD AUTO-RTO: 0 /100 WBC (ref 0–0.2)
PLATELET # BLD AUTO: 109 10*3/MM3 (ref 140–450)
PMV BLD AUTO: 10.6 FL (ref 6–12)
POTASSIUM SERPL-SCNC: 3.6 MMOL/L (ref 3.5–5.2)
PROT SERPL-MCNC: 6.1 G/DL (ref 6–8.5)
RBC # BLD AUTO: 3.86 10*6/MM3 (ref 4.14–5.8)
SODIUM SERPL-SCNC: 138 MMOL/L (ref 136–145)
TROPONIN T SERPL-MCNC: <0.01 NG/ML (ref 0–0.03)
WBC # BLD AUTO: 6.23 10*3/MM3 (ref 3.4–10.8)
WHOLE BLOOD HOLD SPECIMEN: NORMAL
WHOLE BLOOD HOLD SPECIMEN: NORMAL

## 2021-03-19 PROCEDURE — 71045 X-RAY EXAM CHEST 1 VIEW: CPT

## 2021-03-19 PROCEDURE — 99284 EMERGENCY DEPT VISIT MOD MDM: CPT

## 2021-03-19 PROCEDURE — 93005 ELECTROCARDIOGRAM TRACING: CPT | Performed by: EMERGENCY MEDICINE

## 2021-03-19 PROCEDURE — 84484 ASSAY OF TROPONIN QUANT: CPT | Performed by: EMERGENCY MEDICINE

## 2021-03-19 PROCEDURE — 85025 COMPLETE CBC W/AUTO DIFF WBC: CPT | Performed by: EMERGENCY MEDICINE

## 2021-03-19 PROCEDURE — 80053 COMPREHEN METABOLIC PANEL: CPT | Performed by: EMERGENCY MEDICINE

## 2021-03-19 RX ORDER — SODIUM CHLORIDE 0.9 % (FLUSH) 0.9 %
10 SYRINGE (ML) INJECTION AS NEEDED
Status: DISCONTINUED | OUTPATIENT
Start: 2021-03-19 | End: 2021-03-20 | Stop reason: HOSPADM

## 2021-03-20 NOTE — ED NOTES
Pt checked. Pt c/o feeling restless and skin itching. States had used meth this morning. Requesting itch cream for his skin mites. Dr. Fenton updated on pt status.      Martha Coles RN  03/19/21 2131

## 2021-03-20 NOTE — ED PROVIDER NOTES
Subjective   45-year-old male presents to the ED with chief complaint of weakness and fatigue.  He states that he has been taking medications for fungal endocarditis and they have been making him very weak and fatigued.  He states that he has had to have his family members help give him a bath.  He called EMS today secondary to just feeling too weak.  He states that he has had chest pain for months.  Denies nausea vomiting diarrhea or abdominal pain.  No fever or chills.  He is unsure if he completed his total course of medications for his fungal endocarditis which was initially diagnosed in December.  Patient is very poor historian.  Further history review of systems limited secondary to patient input.          Review of Systems   Constitutional: Positive for fatigue.   Cardiovascular: Positive for chest pain.   Neurological: Positive for weakness.   All other systems reviewed and are negative.      Past Medical History:   Diagnosis Date   • Cancer (CMS/HCC)     skin   • Depression    • Endocarditis    • Hepatitis        No Known Allergies    Past Surgical History:   Procedure Laterality Date   • SKIN BIOPSY         History reviewed. No pertinent family history.    Social History     Socioeconomic History   • Marital status: Single     Spouse name: Not on file   • Number of children: Not on file   • Years of education: Not on file   • Highest education level: Not on file   Tobacco Use   • Smoking status: Current Every Day Smoker     Packs/day: 1.00     Types: Cigarettes   • Smokeless tobacco: Former User   Substance and Sexual Activity   • Alcohol use: Yes     Comment: socially   • Drug use: Yes     Types: IV     Comment: meth/heroin           Objective   Physical Exam  Vitals and nursing note reviewed.   Constitutional:       General: He is not in acute distress.     Appearance: He is well-developed. He is not diaphoretic.   HENT:      Head: Normocephalic and atraumatic.      Nose: Nose normal.   Eyes:       Conjunctiva/sclera: Conjunctivae normal.      Pupils: Pupils are equal, round, and reactive to light.   Cardiovascular:      Rate and Rhythm: Normal rate and regular rhythm.   Pulmonary:      Effort: Pulmonary effort is normal. No respiratory distress.      Breath sounds: Normal breath sounds.   Abdominal:      General: There is no distension.      Palpations: Abdomen is soft.      Tenderness: There is no abdominal tenderness.   Musculoskeletal:         General: No deformity.   Neurological:      Mental Status: He is alert and oriented to person, place, and time.      Cranial Nerves: No cranial nerve deficit.      Coordination: Coordination normal.         Procedures           ED Course  ED Course as of Mar 21 0651   Fri Mar 19, 2021   2027 EKG interpreted by me.  Sinus rhythm.  Rate of 73.  Shortened [place] interval.  Significant artifact present but no obvious ST segment T wave changes.  Abnormal EKG.    [CG]   2156 Review of records show that he was evaluated by cardiothoracic surgery at [facility].  He had a negative EMILY as well as a negative CT of his chest to rule out any suspicious cavitary lesions.  He was discharged from their facility on 12/23/2020 with 3-month supply of Cresemba.  He was also supposed to follow-up with infectious disease.    [CG]      ED Course User Index  [CG] Alberto Fenton, DO                                           MDM  Patient presented to the ED complaining of generalized fatigue and weakness.  Recent issues with fungal endocarditis.  Currently he received treatment at [facility].  The patient is unable to tell me what the treatment plan was.  I will attempt obtain records at this time.  Initial EKG nonischemic.  Troponin negative.  Afebrile.  No significant elevation of white blood cell count.  Chest x-ray nonspecific.    Labs here are reassuring.  Extensive review of most recent records from [facility] and [facility] show no known or current  treatment for fungemia or endocarditis.  I feel he is appropriate for discharge into police custody with continued appropriate follow-up.      Final diagnoses:   Chest pain, unspecified type   Methamphetamine abuse (CMS/[facility])       ED Disposition  ED Disposition     ED Disposition Condition Comment    Discharge Stable           No follow-up provider specified.       Medication List      No changes were made to your prescriptions during this visit.          Alberto Fenton, DO  03/21/21 0651